# Patient Record
Sex: FEMALE | Race: BLACK OR AFRICAN AMERICAN | Employment: OTHER | URBAN - METROPOLITAN AREA
[De-identification: names, ages, dates, MRNs, and addresses within clinical notes are randomized per-mention and may not be internally consistent; named-entity substitution may affect disease eponyms.]

---

## 2017-01-06 ENCOUNTER — TELEPHONE (OUTPATIENT)
Dept: ONCOLOGY | Age: 77
End: 2017-01-06

## 2017-02-21 ENCOUNTER — TELEPHONE (OUTPATIENT)
Dept: ONCOLOGY | Age: 77
End: 2017-02-21

## 2017-02-21 NOTE — TELEPHONE ENCOUNTER
[employer]  Medical Oncology at [address] [place] [address]    02/21/17- Call placed to follow up with patient's daughter, Татьяна Espinoza. She stated Ms. Gaby Mejia should be getting out of rehab this week. They're planning to establish care with Dr. Kayla Mallory at [facility] in [facility] Dyan uLz MD.  Татьяна Espinoza requested that we send records as they're hoping to get an appointment in the next couple of weeks. Dr. Caroline Gonzales notified. 02/23/17- Medical records faxed to Dr. Asim Durán office at [phone], confirmation received. Office phone number [phone].

## 2017-10-20 ENCOUNTER — OFFICE VISIT (OUTPATIENT)
Dept: ONCOLOGY | Age: 77
End: 2017-10-20

## 2017-10-20 ENCOUNTER — HOSPITAL ENCOUNTER (OUTPATIENT)
Dept: INFUSION THERAPY | Age: 77
Discharge: HOME | End: 2017-10-20
Payer: MEDICARE

## 2017-10-20 VITALS
OXYGEN SATURATION: 98 % | HEART RATE: 55 BPM | DIASTOLIC BLOOD PRESSURE: 60 MMHG | RESPIRATION RATE: 20 BRPM | TEMPERATURE: 96.7 F | SYSTOLIC BLOOD PRESSURE: 168 MMHG

## 2017-10-20 DIAGNOSIS — I48.20 CHRONIC ATRIAL FIBRILLATION (HCC): ICD-10-CM

## 2017-10-20 DIAGNOSIS — I82.4Y3 ACUTE DEEP VEIN THROMBOSIS (DVT) OF PROXIMAL VEIN OF BOTH LOWER EXTREMITIES (HCC): ICD-10-CM

## 2017-10-20 DIAGNOSIS — I63.9 ACUTE CVA (CEREBROVASCULAR ACCIDENT) (HCC): ICD-10-CM

## 2017-10-20 DIAGNOSIS — C67.9 MALIGNANT NEOPLASM OF URINARY BLADDER, UNSPECIFIED SITE (HCC): Primary | ICD-10-CM

## 2017-10-20 DIAGNOSIS — E11.8 TYPE 2 DIABETES MELLITUS WITH COMPLICATION, UNSPECIFIED LONG TERM INSULIN USE STATUS: ICD-10-CM

## 2017-10-20 DIAGNOSIS — I49.5 SICK SINUS SYNDROME (HCC): ICD-10-CM

## 2017-10-20 LAB
ALBUMIN SERPL-MCNC: 3.4 G/DL (ref 3.5–5)
ALBUMIN/GLOB SERPL: 0.8 {RATIO} (ref 1.1–2.2)
ALP SERPL-CCNC: 82 U/L (ref 45–117)
ALT SERPL-CCNC: 16 U/L (ref 12–78)
ANION GAP SERPL CALC-SCNC: 4 MMOL/L (ref 5–15)
APTT PPP: 27.1 SEC (ref 22.1–32.5)
AST SERPL-CCNC: 21 U/L (ref 15–37)
BASOPHILS # BLD: 0 K/UL (ref 0–0.1)
BASOPHILS NFR BLD: 0 % (ref 0–1)
BILIRUB SERPL-MCNC: 0.5 MG/DL (ref 0.2–1)
BUN SERPL-MCNC: 13 MG/DL (ref 6–20)
BUN/CREAT SERPL: 18 (ref 12–20)
CALCIUM SERPL-MCNC: 9.4 MG/DL (ref 8.5–10.1)
CHLORIDE SERPL-SCNC: 109 MMOL/L (ref 97–108)
CO2 SERPL-SCNC: 32 MMOL/L (ref 21–32)
CREAT SERPL-MCNC: 0.74 MG/DL (ref 0.55–1.02)
EOSINOPHIL # BLD: 0.1 K/UL (ref 0–0.4)
EOSINOPHIL NFR BLD: 3 % (ref 0–7)
ERYTHROCYTE [DISTWIDTH] IN BLOOD BY AUTOMATED COUNT: 15.8 % (ref 11.5–14.5)
GLOBULIN SER CALC-MCNC: 4.4 G/DL (ref 2–4)
GLUCOSE SERPL-MCNC: 87 MG/DL (ref 65–100)
HCT VFR BLD AUTO: 32.5 % (ref 35–47)
HGB BLD-MCNC: 9.9 G/DL (ref 11.5–16)
INR PPP: 1.1 (ref 0.9–1.1)
LYMPHOCYTES # BLD: 0.9 K/UL (ref 0.8–3.5)
LYMPHOCYTES NFR BLD: 20 % (ref 12–49)
MCH RBC QN AUTO: 25.2 PG (ref 26–34)
MCHC RBC AUTO-ENTMCNC: 30.5 G/DL (ref 30–36.5)
MCV RBC AUTO: 82.7 FL (ref 80–99)
MONOCYTES # BLD: 0.3 K/UL (ref 0–1)
MONOCYTES NFR BLD: 6 % (ref 5–13)
NEUTS SEG # BLD: 3.3 K/UL (ref 1.8–8)
NEUTS SEG NFR BLD: 71 % (ref 32–75)
PLATELET # BLD AUTO: 179 K/UL (ref 150–400)
POTASSIUM SERPL-SCNC: 4.1 MMOL/L (ref 3.5–5.1)
PROT SERPL-MCNC: 7.8 G/DL (ref 6.4–8.2)
PROTHROMBIN TIME: 11.1 SEC (ref 9–11.1)
RBC # BLD AUTO: 3.93 M/UL (ref 3.8–5.2)
SODIUM SERPL-SCNC: 145 MMOL/L (ref 136–145)
THERAPEUTIC RANGE,PTTT: NORMAL SECS (ref 58–77)
WBC # BLD AUTO: 4.7 K/UL (ref 3.6–11)

## 2017-10-20 PROCEDURE — 85025 COMPLETE CBC W/AUTO DIFF WBC: CPT | Performed by: INTERNAL MEDICINE

## 2017-10-20 PROCEDURE — 85610 PROTHROMBIN TIME: CPT | Performed by: INTERNAL MEDICINE

## 2017-10-20 PROCEDURE — 80053 COMPREHEN METABOLIC PANEL: CPT | Performed by: INTERNAL MEDICINE

## 2017-10-20 PROCEDURE — 36415 COLL VENOUS BLD VENIPUNCTURE: CPT | Performed by: INTERNAL MEDICINE

## 2017-10-20 PROCEDURE — 85730 THROMBOPLASTIN TIME PARTIAL: CPT | Performed by: INTERNAL MEDICINE

## 2017-10-20 NOTE — MR AVS SNAPSHOT
Visit Information Date & Time Provider Department Dept. Phone Encounter #  
 10/20/2017 11:00 AM Karyn Hathaway MD [facility] at [address] [id number] Follow-up Instructions Return in about 12 days (around 11/1/2017) for Radaakash, image results, bladder cancer. Your Appointments 11/1/2017 10:15 AM  
ESTABLISHED PATIENT with Oscar Sanderson NP  
[address] at [address] [place] [address] Appt Note: Radaakash, image results, bladder cancer [address] [facility], [address] [facility] [place] [address]  
[phone]  
  
   
 [address] [facility], [address] [facility] Upcoming Health Maintenance Date Due  
 FOOT EXAM Q1 6/26/1950 MICROALBUMIN Q1 6/26/1950 COLONOSCOPY 6/26/1958 DTaP/Tdap/Td series (1 - Tdap) 6/26/1961 ZOSTER VACCINE AGE 60> 4/26/2000 OSTEOPOROSIS SCREENING (DEXA) 6/26/2005 Pneumococcal 65+ High/Highest Risk (1 of 2 - PCV13) 6/26/2005 MEDICARE YEARLY EXAM 6/26/2005 EYE EXAM RETINAL OR DILATED Q1 11/30/2015 GLAUCOMA SCREENING Q2Y 11/30/2016 LIPID PANEL Q1 1/18/2017 HEMOGLOBIN A1C Q6M 5/15/2017 INFLUENZA AGE 9 TO ADULT 8/1/2017 Allergies as of 10/20/2017  Review Complete On: 10/20/2017 By: Karyn Hathaway MD  
  
 Severity Noted Reaction Type Reactions Erythromycin  11/28/2012    Hives Flagyl [Metronidazole]  11/28/2012    Hives Macrobid [Nitrofurantoin Monohyd/m-cryst]  10/10/2016    Hives Pcn [Penicillins]  09/09/2016    Hives Sulfa (Sulfonamide Antibiotics)  10/06/2010    Other (comments) Sulfa (Sulfonamide Antibiotics)  11/28/2012    Hives Current Immunizations  Reviewed on 11/15/2016 Name Date Influenza Vaccine 10/18/2015 Not reviewed this visit You Were Diagnosed With   
  
 Codes Comments Malignant neoplasm of urinary bladder, unspecified site [facility])    -  Primary ICD-10-CM: C67.9 ICD-9-CM: 188.9 Acute CVA (cerebrovascular accident) ([facility] [address]     ICD-10-CM: I63.9 ICD-9-CM: 434.91 Type 2 diabetes mellitus with complication, unspecified long term insulin use status (HCC)     ICD-10-CM: E11.8 ICD-9-CM: 250.90 Acute deep vein thrombosis (DVT) of proximal vein of both lower extremities (HCC)     ICD-10-CM: I82.4Y3 ICD-9-CM: 453.41 Sick sinus syndrome (HCC)     ICD-10-CM: I49.5 ICD-9-CM: 427.81 Chronic atrial fibrillation (HCC)     ICD-10-CM: W33.8 ICD-9-CM: 427.31 Vitals BP Pulse Temp Resp SpO2 OB Status 168/60 (BP 1 Location: Right arm, BP Patient Position: Sitting) (!) 55 96.7 °F (35.9 °C) (Temporal) 20 98% Hysterectomy Smoking Status Never Smoker Preferred Pharmacy Pharmacy Name Phone [facility]Y [id number] [place] [phone] Your Updated Medication List  
  
   
This list is accurate as of: 10/20/17 11:59 AM.  Always use your most recent med list.  
  
  
  
  
 amiodarone 200 mg tablet Commonly known as:  CORDARONE Take 100 mg by mouth daily. lidocaine-prilocaine topical cream  
Commonly known as:  EMLA Apply  to affected area as needed for Pain (Apply 30-60 min. prior to having your port accessed). losartan 50 mg tablet Commonly known as:  COZAAR Take 50 mg by mouth two (2) times a day. methIMAzole 5 mg tablet Commonly known as:  TAPAZOLE Take 0.5 Tabs by mouth daily. metoprolol tartrate 50 mg tablet Commonly known as:  LOPRESSOR Take 1 Tab by mouth two (2) times a day. pantoprazole 40 mg tablet Commonly known as:  PROTONIX Take 40 mg by mouth daily. PHOSPHA 250 NEUTRAL 250 mg tablet Generic drug:  phosphorus Take 1 Tab by mouth two (2) times a day. pravastatin 80 mg tablet Commonly known as:  PRAVACHOL Take 1 Tab by mouth nightly. We Performed the Following REFERRAL TO UROLOGY [PEA070 Custom] Follow-up Instructions Return in about 12 days (around 11/1/2017) for Raddin, image results, bladder cancer. To-Do List   
 10/23/2017 Imaging:  CT ABD PELV W WO CONT Around 10/23/2017 Imaging:  CT CHEST W CONT Referral Information Referral ID Referred By Referred To  
  
 [id number] [address]   
   [place], [address] Visits Status Start Date End Date 1 New Request 10/20/17 10/20/18 If your referral has a status of pending review or denied, additional information will be sent to support the outcome of this decision. Introducing [facility]! Dear Betsy Barclay: Thank you for requesting a [employer] account. Our records indicate that you already have an active Beyond[employer] account. You can access your account anytime at [email or web address] Did you know that you can access your hospital and ER discharge instructions at any time in Beyond[employer][email or web address]? You can also review all of your test results from your hospital stay or ER visit. Additional Information If you have questions, please visit the Frequently Asked Questions section of the [email or web address] website at [email or web address] Remember, [email or web address] is NOT to be used for urgent needs. For medical emergencies, dial 911. Now available from your iPhone and Android! Please provide this summary of care documentation to your next provider. Your primary care clinician is listed as Yamilet Rice. If you have any questions after today's visit, please call [phone].

## 2017-10-20 NOTE — PROGRESS NOTES
[address] [facility] Oncology at [address] [place] [address]  [phone]    Hematology / Oncology Followup    Reason for Visit:   Darci Glover is a 68 y.o. female who is seen for follow up of bladder cancer. Treatment History:   · Presented with gross hematuria 6/30/2016  · CT A/P 6/30/2016: Serpentine like filling defects present dependent aspect of urinary bladder. No evidence of metastatic disease  · Cystoscopy with Dr. Ml Olsen 7/11/2016: possible tumor adjacent to bladder diverticulum on right wall of bladder  · Cystoscopy with Dr. Cornelius Lemon 8/10/2016: Solid-appearing tumor on right bladder wall, about 1cm  · TURBT 8/31/2016: Invasive high-grade papillary urothelial carcinoma. There is at least superficial invasion of muscular propria  · CT Chest 9/20/2016: 4mm RLL noncalcified pulmonary nodule. Subcentimeter hypodensity right hepatic lobe too small to characterize. Left upper pole simple renal cyst 1.7cm. · Stage II (pT2 cN0 M0) Bladder Cancer  · Neoadjuvant chemotherapy with split-dose cisplatin and gemcitabine x2 cycles from 10/18/2016 to 11/15/2016. Stopped due to CVA  · Chemotherapy with Carboplatin and Gemcitabine x4 cycles completed around 5/2017 in [place]    History of Present Illness:   She returns today for follow up, almost one year since I last saw her. I treated her with neoadjuvant chemotherapy for bladder cancer, but this was stopped after she suffered a stroke. She went into a SNF and moved to [facility]. She never did have her bladder resected. She ultimately established care with oncology in [place] and was treated with four cycles of Carboplatin and Gemcitabine, reportedly tolerating this well based on records. She agrees that she did well with this. She reports that she was sent to radiation oncology, but moved down here before getting radiation. She does not recall the last time she had a cystoscopy or CT scan.     She reports being diagnosed with a DVT while in [place], treated with xarelto for 3 months. She has been off this for a few days. She fell yesterday, went to the ED at [facility], had sutures placed in scalp. She is weak. She walks with a walker, but struggles. She has someone staying with her around the clock right now. She is a poor historian, remembers very little details about her time in [place]. She has two family members with her today, but not her daughter who was with her in [place]. PAST HISTORY: The following sections were reviewed and updated in the EMR as appropriate: PMH, SH, FH, Medications, Allergies. Allergies   Allergen Reactions    Erythromycin Hives    Flagyl [Metronidazole] Hives    Macrobid [Nitrofurantoin Monohyd/M-Cryst] Hives    Pcn [Penicillins] Hives    Sulfa (Sulfonamide Antibiotics) Other (comments)    Sulfa (Sulfonamide Antibiotics) Hives      Review of Systems: A complete review of systems was obtained, reviewed, and scanned into the EMR. Pertinent findings reviewed above. Physical Exam:     Visit Vitals    /60 (BP 1 Location: Right arm, BP Patient Position: Sitting)    Pulse (!) 55    Temp 96.7 °F (35.9 °C) (Temporal)    Resp 20    SpO2 98%     ECOG PS: 2  General: No distress  Eyes: PERRLA, anicteric sclerae  HENT: Atraumatic, OP clear  Neck: Supple  Lymphatic: No cervical, supraclavicular, or inguinal adenopathy  Respiratory: CTAB, normal respiratory effort  CV: Normal rate, regular rhythm, no murmurs, no peripheral edema  GI: Soft, nontender, nondistended, no masses, no hepatomegaly, no splenomegaly  MS: In wheelchair today. Digits without clubbing or cyanosis. Not able to get onto exam table. Skin: No rashes, ecchymoses, or petechiae. Normal temperature, turgor, and texture. Port in place.   Psych: Alert, oriented, appropriate affect, normal judgment/insight    Results:     Lab Results   Component Value Date/Time    WBC 5.5 11/20/2016 02:21 AM    HGB 10.0 11/20/2016 02:21 AM HCT 30.2 11/20/2016 02:21 AM    PLATELET 466 41/48/2069 02:21 AM    MCV 80.7 11/20/2016 02:21 AM    ABS. NEUTROPHILS 3.7 11/20/2016 02:21 AM    Hemoglobin (POC) 10.5 11/08/2016 09:48 AM    Hematocrit (POC) 31 11/08/2016 09:48 AM     Lab Results   Component Value Date/Time    Sodium 142 11/20/2016 02:21 AM    Potassium 3.5 11/20/2016 02:21 AM    Chloride 106 11/20/2016 02:21 AM    CO2 30 11/20/2016 02:21 AM    Glucose 98 11/20/2016 02:21 AM    BUN 11 11/20/2016 02:21 AM    Creatinine 0.80 11/20/2016 02:21 AM    GFR est AA >60 11/20/2016 02:21 AM    GFR est non-AA >60 11/20/2016 02:21 AM    Calcium 8.4 11/20/2016 02:21 AM    Sodium (POC) 143 11/08/2016 09:48 AM    Potassium (POC) 3.6 11/08/2016 09:48 AM    Chloride (POC) 104 11/08/2016 09:48 AM    Glucose (POC) 84 11/16/2016 11:13 AM    BUN (POC) 7 11/08/2016 09:48 AM    Creatinine (POC) 0.8 11/08/2016 09:48 AM    Calcium, ionized (POC) 1.18 11/08/2016 09:48 AM     Lab Results   Component Value Date/Time    Bilirubin, total 0.4 11/16/2016 04:34 AM    ALT (SGPT) 15 11/16/2016 04:34 AM    AST (SGOT) 13 11/16/2016 04:34 AM    Alk. phosphatase 57 11/16/2016 04:34 AM    Protein, total 6.1 11/16/2016 04:34 AM    Albumin 2.7 11/16/2016 04:34 AM    Globulin 3.4 11/16/2016 04:34 AM       CT C/A/P 5/30/2017: Decreased size of right posterolateral bladder nodule. Right posterolateral bladder wall thickening adjacent to nodule. Stable tiny pulmonary nodules. Records reviewed and summarized above. Assessment:   1) Bladder Cancer  Initially diagnosed with Stage II bladder cancer, received neoadjuvant chemotherapy which was complicated by a CVA. Surgery was therefor not performed. After a several month break she received chemotherapy with Carboplatin and Gemcitabine for 4 cycles in [place]. Plan had been for radiation after this, but again she was lost to follow up until now. I will restage her with CT C/A/P. I will get her back in with urology for another cystoscopy. If no evidence of distant disease, we can consider definitive radiation +/- chemotherapy, but she may need a repeat TURBT prior to this, depending on what is see on cystoscopy. 2) DVT, bilateral  Occurred in [place]. Off anticoagulation now. Request records. For a malignancy associated thrombus, she may benefit from long-term anticoagulation. Additionally, with her Afib she has an indication for long-term therapy. Reasonable to continue to hold for now, given possible upcoming procedures. 3) Bradycardia, Afib  S/P pacemaker placement. Previously on apxiaban, then rivaroxaban. Now off anticoagulation. Follow up with cardiology. 4) DM  PCP managing. 5) Port care  Has not been flushed in some time. Will set her up for regular flushes.     Plan:     · Labs today: CBC, CMP, PT, PTT  · Port flushes every 4-6 weeks  · CT C/A/P  · Referral back to urology for cystoscopy  · Request additional records from [place]  · Return to see me to review results of above      Signed By: Figueroa Cuellar MD     October 20, 2017

## 2017-10-20 NOTE — PROGRESS NOTES
There were no vitals taken for this visit. Pt arrived at 1216,labs drawn peripherally from left [facility]. Pt tolerated well and left at 1225.

## 2017-10-30 ENCOUNTER — HOSPITAL ENCOUNTER (OUTPATIENT)
Dept: CT IMAGING | Age: 77
Discharge: HOME | End: 2017-10-30
Attending: INTERNAL MEDICINE
Payer: MEDICARE

## 2017-10-30 DIAGNOSIS — C67.9 MALIGNANT NEOPLASM OF URINARY BLADDER, UNSPECIFIED SITE (HCC): ICD-10-CM

## 2017-10-30 PROCEDURE — 74011636320 HC RX REV CODE- 636/320: Performed by: RADIOLOGY

## 2017-10-30 PROCEDURE — 74177 CT ABD & PELVIS W/CONTRAST: CPT

## 2017-10-30 RX ADMIN — IOPAMIDOL 90 ML: 755 INJECTION, SOLUTION INTRAVENOUS at 10:16

## 2017-11-01 ENCOUNTER — OFFICE VISIT (OUTPATIENT)
Dept: ONCOLOGY | Age: 77
End: 2017-11-01

## 2017-11-01 ENCOUNTER — DOCUMENTATION ONLY (OUTPATIENT)
Dept: ONCOLOGY | Age: 77
End: 2017-11-01

## 2017-11-01 VITALS
SYSTOLIC BLOOD PRESSURE: 168 MMHG | RESPIRATION RATE: 20 BRPM | TEMPERATURE: 97.8 F | HEART RATE: 60 BPM | OXYGEN SATURATION: 98 % | DIASTOLIC BLOOD PRESSURE: 70 MMHG

## 2017-11-01 DIAGNOSIS — C67.9 MALIGNANT NEOPLASM OF URINARY BLADDER, UNSPECIFIED SITE (HCC): Primary | ICD-10-CM

## 2017-11-01 DIAGNOSIS — I48.20 CHRONIC ATRIAL FIBRILLATION (HCC): ICD-10-CM

## 2017-11-01 DIAGNOSIS — E11.8 TYPE 2 DIABETES MELLITUS WITH COMPLICATION, UNSPECIFIED LONG TERM INSULIN USE STATUS: ICD-10-CM

## 2017-11-01 DIAGNOSIS — I63.9 ACUTE CVA (CEREBROVASCULAR ACCIDENT) (HCC): ICD-10-CM

## 2017-11-01 DIAGNOSIS — I82.4Y3 ACUTE DEEP VEIN THROMBOSIS (DVT) OF PROXIMAL VEIN OF BOTH LOWER EXTREMITIES (HCC): ICD-10-CM

## 2017-11-01 DIAGNOSIS — R93.2 ABNORMAL FINDINGS ON DIAGNOSTIC IMAGING OF LIVER: ICD-10-CM

## 2017-11-01 NOTE — PROGRESS NOTES
[address] [facility] at [facility]  [phone]    Hematology / Oncology Followup    Reason for Visit:   Darci Glover is a 68 y.o. female who is seen for follow up of bladder cancer. Treatment History:   · Presented with gross hematuria 6/30/2016  · CT A/P 6/30/2016: Serpentine like filling defects present dependent aspect of urinary bladder. No evidence of metastatic disease  · Cystoscopy with Dr. Ml Olsen 7/11/2016: possible tumor adjacent to bladder diverticulum on right wall of bladder  · Cystoscopy with Dr. Cornelius Lemon 8/10/2016: Solid-appearing tumor on right bladder wall, about 1cm  · TURBT 8/31/2016: Invasive high-grade papillary urothelial carcinoma. There is at least superficial invasion of muscular propria  · CT Chest 9/20/2016: 4mm RLL noncalcified pulmonary nodule. Subcentimeter hypodensity right hepatic lobe too small to characterize. Left upper pole simple renal cyst 1.7cm. · Stage II (pT2 cN0 M0) Bladder Cancer  · Neoadjuvant chemotherapy with split-dose cisplatin and gemcitabine x2 cycles from 10/18/2016 to 11/15/2016. Stopped due to CVA  · Chemotherapy with Carboplatin and Gemcitabine x4 cycles completed around 5/2017 in [place]    History of Present Illness:   Here today for follow up and imaging results. She reports feeling okay. Walking with walker at home. Has aides round the clock to help her at home. She reports voiding without difficulty. Some mild swelling to legs but stable. She reports she spends most of her days sitting in her wheelchair but getting up and walking around. No falls since last visit. She states she has TURBT scheduled for 11/20 with Dr. Cornelius Lemon. She is accompanied by her cousin today. PAST HISTORY: The following sections were reviewed and updated in the EMR as appropriate: PMH, SH, FH, Medications, Allergies.       Allergies   Allergen Reactions    Erythromycin Hives    Flagyl [Metronidazole] Hives    Macrobid [Nitrofurantoin Monohyd/M-Cryst] Hives    Pcn [Penicillins] Hives    Sulfa (Sulfonamide Antibiotics) Other (comments)    Sulfa (Sulfonamide Antibiotics) Hives      Review of Systems: A complete review of systems was obtained, reviewed, and scanned into the EMR. Pertinent findings reviewed above. Physical Exam:     Visit Vitals    /70 (BP 1 Location: Left arm, BP Patient Position: Sitting)    Pulse 60    Temp 97.8 °F (36.6 °C) (Temporal)    Resp 20    SpO2 98%     ECOG PS: 2  General: No distress  Eyes: PERRLA, anicteric sclerae  HENT: Atraumatic, OP clear  Neck: Supple  Lymphatic: No cervical, supraclavicular, or inguinal adenopathy  Respiratory: CTAB, normal respiratory effort  CV: Normal rate, regular rhythm, no murmurs, no peripheral edema  GI: Soft, nontender, nondistended, no masses, no hepatomegaly, no splenomegaly  MS: In wheelchair today. Digits without clubbing or cyanosis. Not able to get onto exam table. Skin: No rashes, ecchymoses, or petechiae. Normal temperature, turgor, and texture. Port in place. Psych: Alert, oriented, appropriate affect, normal judgment/insight    Results:     Lab Results   Component Value Date/Time    WBC 4.7 10/20/2017 12:18 PM    HGB 9.9 10/20/2017 12:18 PM    HCT 32.5 10/20/2017 12:18 PM    PLATELET 204 42/35/0087 12:18 PM    MCV 82.7 10/20/2017 12:18 PM    ABS.  NEUTROPHILS 3.3 10/20/2017 12:18 PM    Hemoglobin (POC) 10.5 11/08/2016 09:48 AM    Hematocrit (POC) 31 11/08/2016 09:48 AM     Lab Results   Component Value Date/Time    Sodium 145 10/20/2017 12:18 PM    Potassium 4.1 10/20/2017 12:18 PM    Chloride 109 10/20/2017 12:18 PM    CO2 32 10/20/2017 12:18 PM    Glucose 87 10/20/2017 12:18 PM    BUN 13 10/20/2017 12:18 PM    Creatinine 0.74 10/20/2017 12:18 PM    GFR est AA >60 10/20/2017 12:18 PM    GFR est non-AA >60 10/20/2017 12:18 PM    Calcium 9.4 10/20/2017 12:18 PM    Sodium (POC) 143 11/08/2016 09:48 AM    Potassium (POC) 3.6 11/08/2016 09:48 AM Chloride (POC) 104 11/08/2016 09:48 AM    Glucose (POC) 84 11/16/2016 11:13 AM    BUN (POC) 7 11/08/2016 09:48 AM    Creatinine (POC) 0.8 11/08/2016 09:48 AM    Calcium, ionized (POC) 1.18 11/08/2016 09:48 AM     Lab Results   Component Value Date/Time    Bilirubin, total 0.5 10/20/2017 12:18 PM    ALT (SGPT) 16 10/20/2017 12:18 PM    AST (SGOT) 21 10/20/2017 12:18 PM    Alk. phosphatase 82 10/20/2017 12:18 PM    Protein, total 7.8 10/20/2017 12:18 PM    Albumin 3.4 10/20/2017 12:18 PM    Globulin 4.4 10/20/2017 12:18 PM       CT C/A/P 5/30/2017: Decreased size of right posterolateral bladder nodule. Right posterolateral bladder wall thickening adjacent to nodule. Stable tiny pulmonary nodules. CT C/A/P 10/30/2017:   1. Superior right posteriolateral bladder wall mass measuring 1.2 x 1.4 x 1.6 cm suspicious for bladder cancer. 2. 2.5 x 2.5 cm calcification containing soft tissue mass adjacent to left internal iliac artery concerning for metastatic elli disease, unchanged since 6/30/2016. 3. Probable thyroid goiter. Correlation with any prior outside imaging or with thyroid ultrasound is recommended. 4. Heterogeneous portal venous phase enhancement of the posterior aspect of the right lobe of the liver not typical for metastatic disease but of uncertain etiology. Correlate with clinical and laboratory data and consider further evaluation with dedicated hepatic MRI preferred over CT. 5. Additional incidental findings including coronary artery disease and nonobstructed small bowel loop within midline abdominal wall hernia. Assessment:   1) Bladder Cancer  Initially diagnosed with Stage II bladder cancer, received neoadjuvant chemotherapy which was complicated by a CVA. Surgery was therefore not performed. After a several month break she received chemotherapy with Carboplatin and Gemcitabine for 4 cycles in [place].   Plan in [place] had been to proceed next with radiation +/- chemotherapy, but again she was lost to follow up until reestablishing care with me recently. Repeat cystoscopy with Dr. Charan Rees has confirmed residual bladder tumor, and he plans a repeat TURBT. Restaging CT scan confirms this finding, along with a stable but suspicious pelvic lymph node and a questionable liver abnormality. I will get an MRI to evaluate the questionable liver finding. If no evidence of metastatic disease, then I would like to get an opinion from radiation oncology regarding the feasibility of definitive radiation +/- chemotherapy. The lymph node may complicate this somewhat. She apparently did well in [place] with Carbo/Freeborn, so I would consider concurrent chemotherapy with FU and Mitomycin. If she is not a candidate for definitive radiation, then I wonder about a course of palliative radiation followed potentially by immunotherapy. She will follow up with me after meeting with radiation oncology. 2) DVT, bilateral  Occurred in [place]. Off anticoagulation now. For a malignancy associated thrombus, she may benefit from long-term anticoagulation. Additionally, with her Afib she has an indication for long-term therapy. Reasonable to continue to hold for now, given possible upcoming procedures. 3) Bradycardia, Afib  S/P pacemaker placement. Previously on apxiaban, then rivaroxaban. Now off anticoagulation. Follow up with cardiology. 4) DM  PCP managing. 5) Port care  Continue port flushes.      Plan:     · Referral to radiation oncology  · MRI Abdomen  · TURBT with Dr. Charan Rees  · Port flush every 4-6 weeks  · Return to clinic TBD pending rad onc visit    Case discussed with Dr. Evy Galeas By: Ritchie Gómez MD     November 1, 2017

## 2017-11-01 NOTE — MR AVS SNAPSHOT
Visit Information Date & Time Provider Department Dept. Phone Encounter #  
 11/1/2017  2:45 PM Ana Maria Fowler NP [facility] at [address] [place] [address] Rd [id number] Upcoming Health Maintenance Date Due  
 FOOT EXAM Q1 6/26/1950 MICROALBUMIN Q1 6/26/1950 COLONOSCOPY 6/26/1958 DTaP/Tdap/Td series (1 - Tdap) 6/26/1961 ZOSTER VACCINE AGE 60> 4/26/2000 OSTEOPOROSIS SCREENING (DEXA) 6/26/2005 Pneumococcal 65+ High/Highest Risk (1 of 2 - PCV13) 6/26/2005 MEDICARE YEARLY EXAM 6/26/2005 EYE EXAM RETINAL OR DILATED Q1 11/30/2015 GLAUCOMA SCREENING Q2Y 11/30/2016 LIPID PANEL Q1 1/18/2017 HEMOGLOBIN A1C Q6M 5/15/2017 INFLUENZA AGE 9 TO ADULT 8/1/2017 Allergies as of 11/1/2017  Review Complete On: 11/1/2017 By: Ana Maria Fowler NP Severity Noted Reaction Type Reactions Erythromycin  11/28/2012    Hives Flagyl [Metronidazole]  11/28/2012    Hives Macrobid [Nitrofurantoin Monohyd/m-cryst]  10/10/2016    Hives Pcn [Penicillins]  09/09/2016    Hives Sulfa (Sulfonamide Antibiotics)  10/06/2010    Other (comments) Sulfa (Sulfonamide Antibiotics)  11/28/2012    Hives Current Immunizations  Reviewed on 11/15/2016 Name Date Influenza Vaccine 10/18/2015 Not reviewed this visit Vitals BP Pulse Temp Resp SpO2 OB Status 168/70 (BP 1 Location: Left arm, BP Patient Position: Sitting) 60 97.8 °F (36.6 °C) (Temporal) 20 98% Hysterectomy Smoking Status Never Smoker Preferred Pharmacy Pharmacy Name Phone [id number] [address] [place] [address] [phone] Your Updated Medication List  
  
   
This list is accurate as of: 11/1/17  3:50 PM.  Always use your most recent med list.  
  
  
  
  
 amiodarone 200 mg tablet Commonly known as:  CORDARONE Take 100 mg by mouth daily. lidocaine-prilocaine topical cream  
Commonly known as:  EMLA Apply  to affected area as needed for Pain (Apply 30-60 min. prior to having your port accessed). losartan 50 mg tablet Commonly known as:  COZAAR Take 50 mg by mouth two (2) times a day. methIMAzole 5 mg tablet Commonly known as:  TAPAZOLE Take 0.5 Tabs by mouth daily. metoprolol tartrate 50 mg tablet Commonly known as:  LOPRESSOR Take 1 Tab by mouth two (2) times a day. pantoprazole 40 mg tablet Commonly known as:  PROTONIX Take 40 mg by mouth daily. PHOSPHA 250 NEUTRAL 250 mg tablet Generic drug:  phosphorus Take 1 Tab by mouth two (2) times a day. pravastatin 80 mg tablet Commonly known as:  PRAVACHOL Take 1 Tab by mouth nightly. Introducing [facility] SERVICES! Dear Sharmila Charter: Thank you for requesting a [employer] account. Our records indicate that you already have an active [employer] account. You can access your account anytime at [email or web address] Did you know that you can access your hospital and ER discharge instructions at any time in [employer]? You can also review all of your test results from your hospital stay or ER visit. Additional Information If you have questions, please visit the Frequently Asked Questions section of the [employer] website at [email or web address] Remember, [employer] is NOT to be used for urgent needs. For medical emergencies, dial 911. Now available from your iPhone and Android! Please provide this summary of care documentation to your next provider. Your primary care clinician is listed as Migdalia Metzger. If you have any questions after today's visit, please call [phone].

## 2017-11-02 NOTE — PROGRESS NOTES
[facility]  Social Work Navigator Encounter     Patient Name:   Rolando Hernandez    Medical History: bladder cancer    Narrative: Pt returns to clinic for follow-up with Dr. Julius Goins. Met with pt to address transportation barrier to care identified. Pt lives in [place] and in unable to drive. Pt tells me she does not have family/firends able to provide transporation to appointments or treatments. Pt also tells me her income is limited and thus she does not have the financial resources to pay for transportation. This MSW called [facility] to inquire about transportation resource. VM left with the , [employer], requesting a return call. Phone # [phone]. Pt might also be eligible for financial assistance through [address] to help with the cost of transportation. Will discuss transportation resources with pt once more information on obtained. Barriers to Care: transportation    Plan:   1.  This MSW will research transportation options and discuss with pt    -BIJAN Rea

## 2017-11-08 ENCOUNTER — HOSPITAL ENCOUNTER (OUTPATIENT)
Dept: RADIATION THERAPY | Age: 77
Discharge: HOME | End: 2017-11-08

## 2017-11-08 ENCOUNTER — TELEPHONE (OUTPATIENT)
Dept: ONCOLOGY | Age: 77
End: 2017-11-08

## 2017-11-08 NOTE — TELEPHONE ENCOUNTER
11/8 - Left vm for pt requesting a return call to discuss possible transportation resources. Will recommend pt call [facility] (#[phone]) to complete phone intake for transportation program asap. Intake takes about 3 days. There is a small fee based on income. 11/9 - Called pt to provide her within transportation information noted above. Pt tells me she will call to schedule and intake with them today. Pt was asked to call me back once intake is completed to let me know if she has secured transportation with the resource so I can update Dr. Romario Post and Dr. Jessi Urias. Pt voiced understanding. 11/14 - Follow-up call placed to pt to discuss whether pt will use transportation resources noted above. VM left requesting a return call. 11/15 - Spoke with pt who tells me she has not received a follow-up call to complete transportation screening. She tells me she called for the second time yesterday and was told she should receive a call soon. This MSW advised she would call pt on Friday (11/17) to find out if she was approved and then follow-up with providers. Pt voiced understanding. 11/17 - Follow-up call placed to pt to ask if she was able to get qualify for transportation with [facility]. Left  requesting a return call. 11/22 - Called and spoke to pt regarding transportation. Pt tells me [facility] called her back a few days ago but she missed the call. She tells me she has not tried to call them back yet. Encouraged pt call them so we know where to schedule radiation appointments. Pt voiced understanding and tells me she will call them today and then call me back with an update.  Provide pt my contact information.        -BIJAN Ordaz

## 2017-11-27 ENCOUNTER — TELEPHONE (OUTPATIENT)
Dept: ONCOLOGY | Age: 77
End: 2017-11-27

## 2017-11-28 NOTE — TELEPHONE ENCOUNTER
11/28 - Called pt and asked if she was able to secure transportation with [facility] for radiation treatment. Pt tells me she called last week but their office was closed for the holiday so she has not completed the intake. She tells me she will call again today. This MSW called [facility] and left  with Ms. Tanya Treviño, the transportation , to inquire about this referral.     2:48pm - Follow-up call placed to pt regarding transportation. Pt tells me she left another  but was told Ms. Tanya Treviño was out the office today. She tells me she will call again tomorrow. This MSW updated Ori Farrell RN.      -BIJAN Rea

## 2017-12-05 ENCOUNTER — TELEPHONE (OUTPATIENT)
Dept: ONCOLOGY | Age: 77
End: 2017-12-05

## 2017-12-05 ENCOUNTER — OP HISTORICAL/CONVERTED ENCOUNTER (OUTPATIENT)
Dept: OTHER | Age: 77
End: 2017-12-05

## 2017-12-05 NOTE — TELEPHONE ENCOUNTER
[address] [facility] [place]  [phone]    Patient saw Dr. Cristo Clark in [place]. She has consented to radiation, but doesn't want to start until after the Mike holiday. He is going to do her simulation on 12/27 and start on 1/3. I will bring her back before then to discuss concurrent chemotherapy further.

## 2017-12-06 NOTE — TELEPHONE ENCOUNTER
[address] at [place]  [phone]    12/06/17- Phone call placed to patient- she is scheduled for 12/20/17 at 8:45am to follow up to discuss treatment options.

## 2017-12-08 ENCOUNTER — TELEPHONE (OUTPATIENT)
Dept: ONCOLOGY | Age: 77
End: 2017-12-08

## 2017-12-08 NOTE — TELEPHONE ENCOUNTER
Patients daughter, Mago Disla, called and stated that she would like a call back regarding patients last appointment.  # [phone]

## 2017-12-08 NOTE — TELEPHONE ENCOUNTER
[facility]  [phone]    12/08/17- Phone call placed to [facility], she had questions regarding new diagnosis. She requested for  to return phone call to discuss.

## 2017-12-11 ENCOUNTER — TELEPHONE (OUTPATIENT)
Dept: ONCOLOGY | Age: 77
End: 2017-12-11

## 2017-12-11 NOTE — TELEPHONE ENCOUNTER
12/11  Follow-up call to pt's daughter, Emanuel Arteaga, and vm left requesting a return call to share transportation resources available to pt.    12/13 Follow-up call placed to pt's daughter. Provided her with contact information for [address] ([phone]) to a low cost transportation resource for seniors living in [place]. Explained the process and that a phone intake is needed. She tells me she will call this resource and help her mom get enrolled. She lives in [place] and is not able to help with regular transportation. No additional barriers identified a this time.  Encouraged her to call with additional questions.       -BIJAN Arias

## 2017-12-20 ENCOUNTER — DOCUMENTATION ONLY (OUTPATIENT)
Dept: ONCOLOGY | Age: 77
End: 2017-12-20

## 2017-12-20 ENCOUNTER — HOSPITAL ENCOUNTER (OUTPATIENT)
Dept: INFUSION THERAPY | Age: 77
Discharge: HOME | End: 2017-12-20
Payer: MEDICARE

## 2017-12-20 ENCOUNTER — OFFICE VISIT (OUTPATIENT)
Dept: ONCOLOGY | Age: 77
End: 2017-12-20

## 2017-12-20 VITALS
OXYGEN SATURATION: 98 % | RESPIRATION RATE: 18 BRPM | DIASTOLIC BLOOD PRESSURE: 67 MMHG | HEART RATE: 63 BPM | TEMPERATURE: 98.3 F | SYSTOLIC BLOOD PRESSURE: 145 MMHG

## 2017-12-20 VITALS
HEIGHT: 63 IN | TEMPERATURE: 97.9 F | BODY MASS INDEX: 28.53 KG/M2 | OXYGEN SATURATION: 98 % | HEART RATE: 60 BPM | RESPIRATION RATE: 18 BRPM | DIASTOLIC BLOOD PRESSURE: 67 MMHG | SYSTOLIC BLOOD PRESSURE: 151 MMHG | WEIGHT: 161 LBS

## 2017-12-20 DIAGNOSIS — I63.9 ACUTE CVA (CEREBROVASCULAR ACCIDENT) (HCC): ICD-10-CM

## 2017-12-20 DIAGNOSIS — E11.8 TYPE 2 DIABETES MELLITUS WITH COMPLICATION, UNSPECIFIED LONG TERM INSULIN USE STATUS: ICD-10-CM

## 2017-12-20 DIAGNOSIS — Z95.0 PACEMAKER: ICD-10-CM

## 2017-12-20 DIAGNOSIS — C67.9 MALIGNANT NEOPLASM OF URINARY BLADDER, UNSPECIFIED SITE (HCC): Primary | ICD-10-CM

## 2017-12-20 DIAGNOSIS — I82.4Y3 ACUTE DEEP VEIN THROMBOSIS (DVT) OF PROXIMAL VEIN OF BOTH LOWER EXTREMITIES (HCC): ICD-10-CM

## 2017-12-20 DIAGNOSIS — I48.20 CHRONIC ATRIAL FIBRILLATION (HCC): ICD-10-CM

## 2017-12-20 PROCEDURE — 96523 IRRIG DRUG DELIVERY DEVICE: CPT

## 2017-12-20 PROCEDURE — 77030012965 HC NDL HUBR BBMI -A

## 2017-12-20 RX ORDER — ONDANSETRON HYDROCHLORIDE 8 MG/1
8 TABLET, FILM COATED ORAL
Qty: 45 TAB | Refills: 5 | Status: SHIPPED | OUTPATIENT
Start: 2017-12-20 | End: 2018-03-22 | Stop reason: ALTCHOICE

## 2017-12-20 RX ORDER — MIRABEGRON 50 MG/1
TABLET, FILM COATED, EXTENDED RELEASE ORAL
Refills: 12 | COMMUNITY
Start: 2017-12-02 | End: 2018-03-22 | Stop reason: ALTCHOICE

## 2017-12-20 RX ORDER — HYDROCODONE BITARTRATE AND ACETAMINOPHEN 5; 325 MG/1; MG/1
TABLET ORAL
Refills: 0 | COMMUNITY
Start: 2017-11-21 | End: 2018-03-22 | Stop reason: ALTCHOICE

## 2017-12-20 RX ORDER — DEXAMETHASONE 4 MG/1
TABLET ORAL
Qty: 20 TAB | Refills: 0 | Status: SHIPPED | OUTPATIENT
Start: 2017-12-20 | End: 2018-03-22 | Stop reason: ALTCHOICE

## 2017-12-20 RX ORDER — LIDOCAINE AND PRILOCAINE 25; 25 MG/G; MG/G
CREAM TOPICAL
Qty: 30 G | Refills: 3 | Status: SHIPPED | OUTPATIENT
Start: 2017-12-20 | End: 2018-10-16

## 2017-12-20 RX ORDER — PROCHLORPERAZINE MALEATE 10 MG
10 TABLET ORAL
Qty: 50 TAB | Refills: 5 | Status: SHIPPED | OUTPATIENT
Start: 2017-12-20 | End: 2018-03-22 | Stop reason: ALTCHOICE

## 2017-12-20 RX ORDER — ERGOCALCIFEROL 1.25 MG/1
CAPSULE ORAL
Refills: 0 | COMMUNITY
Start: 2017-11-11 | End: 2018-03-22 | Stop reason: ALTCHOICE

## 2017-12-20 RX ORDER — DOCUSATE SODIUM 100 MG/1
CAPSULE, LIQUID FILLED ORAL
Refills: 0 | COMMUNITY
Start: 2017-11-21 | End: 2018-10-16

## 2017-12-20 RX ORDER — OMEPRAZOLE 20 MG/1
CAPSULE, DELAYED RELEASE ORAL
Refills: 1 | COMMUNITY
Start: 2017-11-20 | End: 2018-10-16

## 2017-12-20 RX ORDER — HYDRALAZINE HYDROCHLORIDE 25 MG/1
25 TABLET, FILM COATED ORAL DAILY
COMMUNITY
End: 2018-03-22 | Stop reason: ALTCHOICE

## 2017-12-20 NOTE — PATIENT INSTRUCTIONS
Common Side Effects of Chemotherapy  Decreased Blood Counts Your blood counts can decrease temporarily due to chemotherapy, they will recover over time. This is an expected side effect that your Doctor will be monitoring.  - If you experience fevers (temperature >100.4°F), bleeding or unexplained bruising, please call the office right away   Risk of Infection Your white blood cells can decrease temporarily due to chemotherapy and can put you at higher risk of infection. Washing hands frequently with soap and avoiding sick contacts can reduce your risk of infection.  - If you experience fevers (temperature >100.4°F), shaking chills, or any signs of infection, please call the office immediately   Anemia Chemotherapy can cause your red blood cells to temporarily decrease; this is an expected side effect that your Doctor will be monitoring.  - You may experience fatigue if this occurs, please notify the office if you experience bleeding, shortness of breath with minimal exertion or at rest, rapid heartbeat, or feeling as though you may lose consciousness. Hair Loss Chemotherapy can affect your hair follicles and cause you to lose hair. This can occur on your scalp hair but also all over your body including eyebrows and eye lashes   Nausea  You have been prescribed nausea medication to take if needed. Please follow the directions given to you by your Doctor. - Please call the office if the medications you have been given are not relieving nausea. Vomiting Make sure you are taking anti-nausea medication as prescribed. Eating small amounts of bland foods frequently can help. - Please call the office right away if you are vomiting more than 4 times per day or are unable to keep down food or fluids   Diarrhea Eating small amounts of bland foods frequently can help, increase your fluid intake. It is usually ok to take Imodium for diarrhea.   - Please call the office right away if you experience more than 4 episodes of watery diarrhea or if you are feeling dehydrated. Female patients of childbearing age need to avoid pregnancy during chemotherapy. You can reach Medical Oncology at [address] [place] [address] with further questions or concerns at: [phone].  - Calls during normal business hours will reach our office.  - Calls after hours or on the weekend will reach an answering service and the on-call Oncologist will return your call.

## 2017-12-20 NOTE — PROGRESS NOTES
[facility] Progress Note    Date: 2017    Name: Estrellita Daniel    MRN: [id number]         : 1940      1115. Ms. Stoney Rodriguez Arrived ambulatory and in no distress for [facility]. Assessment was completed, no acute issues at this time, no new complaints voiced. R chest wall port accessed without difficulty by ELPIDIO Quick RN. + blood return. Port de-accessed, flushed & heparinized per protocol. Ms. Kayli Lee vitals were reviewed. Visit Vitals    /67 (BP 1 Location: Left arm, BP Patient Position: Sitting)    Pulse 63    Temp 98.3 °F (36.8 °C)    Resp 18    SpO2 98%       1125. Ms. Stoney Rodriguez tolerated treatment well and was discharged from [place] [address] in stable condition.     Aleah Self RN  2017

## 2017-12-20 NOTE — PROGRESS NOTES
[employer]  Social Work Navigator Encounter     Patient Name:  Radha Gomez    Medical History: Bladder Cancer    Narrative: Met with pt and pt's daughter in-law regarding transportation barriers to treatment. Asked pt if she or her daughter Milagro Mueller were able to secure transportation with [employer]. Pt tells me she was not able to connect with them and she does not think her daughter was able to either. Pt will need transportation a total of four times for chemotherapy treatment. Asked if family or friends would be able to help with transportation. Pt daughter in-law states that she would be able to help. Also provided pt with Cancer Care information to apply for gas assistance if needed. Pt voiced understanding. Provided my contact information again. Plan:   1. Pt's daughter in-law is able to provide transportation to treatment  2. Provided gas assistance resources  3.  Ongoing psychosocial support as needed    -BIJAN Gonzalez

## 2017-12-20 NOTE — PROGRESS NOTES
[facility] at [address], [address] [place] [address]  Burton Dormanter: [phone]  F: [phone]      Reason for Visit:   Ben Chan is a 68 y.o. female who is seen for follow up of bladder cancer. Treatment History:   · Presented with gross hematuria 6/30/2016  · CT A/P 6/30/2016: Serpentine like filling defects present dependent aspect of urinary bladder. No evidence of metastatic disease  · Cystoscopy with Dr. Arvin Araujo 7/11/2016: possible tumor adjacent to bladder diverticulum on right wall of bladder  · Cystoscopy with Dr. Minerva Lock 8/10/2016: Solid-appearing tumor on right bladder wall, about 1cm  · TURBT 8/31/2016: Invasive high-grade papillary urothelial carcinoma. There is at least superficial invasion of muscular propria  · CT Chest 9/20/2016: 4mm RLL noncalcified pulmonary nodule. Subcentimeter hypodensity right hepatic lobe too small to characterize. Left upper pole simple renal cyst 1.7cm. · Stage II (pT2 cN0 M0) Bladder Cancer  · Neoadjuvant chemotherapy with split-dose cisplatin and gemcitabine x2 cycles from 10/18/2016 to 11/15/2016. Stopped due to CVA  · Chemotherapy with Carboplatin and Gemcitabine x4 cycles completed around 5/2017 in [place]  · TURBT 11/20/2017 with Dr. Minerva Lock: high-grade T1 bladder cancer    History of Present Illness:   Here today for follow up and chemotherapy teaching. She has been seen by rad onc in [facility] and plan to return 12/27 for planning. She states she is feeling pretty good. Urinary incontinence persists, wearing depends. Eating and drinking okay. She is accompanied by her daughter-in-law today. PAST HISTORY: The following sections were reviewed and updated in the EMR as appropriate: PMH, SH, FH, Medications, Allergies.       Allergies   Allergen Reactions    Erythromycin Hives    Flagyl [Metronidazole] Hives    Macrobid [Nitrofurantoin Monohyd/M-Cryst] Hives    Pcn [Penicillins] Hives    Sulfa (Sulfonamide Antibiotics) Hives      Review of Systems: A complete review of systems was obtained, reviewed, and scanned into the EMR. Pertinent findings reviewed above. Physical Exam:     Visit Vitals    /67 (BP 1 Location: Left arm, BP Patient Position: Sitting)    Pulse 60    Temp 97.9 °F (36.6 °C) (Oral)    Resp 18    Ht 5' 3\" (1.6 m)    Wt 161 lb (73 kg)    SpO2 98%    BMI 28.52 kg/m2     ECOG PS: 2  General: No distress  Eyes: PERRLA, anicteric sclerae  HENT: Atraumatic, OP clear  Neck: Supple  Lymphatic: No cervical, supraclavicular, or inguinal adenopathy  Respiratory: CTAB, normal respiratory effort  CV: Normal rate, regular rhythm, no murmurs, no peripheral edema  GI: Soft, nontender, nondistended, no masses, no hepatomegaly, no splenomegaly  MS: In wheelchair today. Digits without clubbing or cyanosis. Not able to get onto exam table. Skin: No rashes, ecchymoses, or petechiae. Normal temperature, turgor, and texture. Port in place. Psych: Alert, oriented, appropriate affect, normal judgment/insight    Results:     Lab Results   Component Value Date/Time    WBC 4.7 10/20/2017 12:18 PM    HGB 9.9 10/20/2017 12:18 PM    HCT 32.5 10/20/2017 12:18 PM    PLATELET 176 51/29/4995 12:18 PM    MCV 82.7 10/20/2017 12:18 PM    ABS.  NEUTROPHILS 3.3 10/20/2017 12:18 PM    Hemoglobin (POC) 10.5 11/08/2016 09:48 AM    Hematocrit (POC) 31 11/08/2016 09:48 AM     Lab Results   Component Value Date/Time    Sodium 145 10/20/2017 12:18 PM    Potassium 4.1 10/20/2017 12:18 PM    Chloride 109 10/20/2017 12:18 PM    CO2 32 10/20/2017 12:18 PM    Glucose 87 10/20/2017 12:18 PM    BUN 13 10/20/2017 12:18 PM    Creatinine 0.74 10/20/2017 12:18 PM    GFR est AA >60 10/20/2017 12:18 PM    GFR est non-AA >60 10/20/2017 12:18 PM    Calcium 9.4 10/20/2017 12:18 PM    Sodium (POC) 143 11/08/2016 09:48 AM    Potassium (POC) 3.6 11/08/2016 09:48 AM    Chloride (POC) 104 11/08/2016 09:48 AM    Glucose (POC) 84 11/16/2016 11:13 AM    BUN (POC) 7 11/08/2016 09:48 AM    Creatinine (POC) 0.8 11/08/2016 09:48 AM    Calcium, ionized (POC) 1.18 11/08/2016 09:48 AM     Lab Results   Component Value Date/Time    Bilirubin, total 0.5 10/20/2017 12:18 PM    ALT (SGPT) 16 10/20/2017 12:18 PM    AST (SGOT) 21 10/20/2017 12:18 PM    Alk. phosphatase 82 10/20/2017 12:18 PM    Protein, total 7.8 10/20/2017 12:18 PM    Albumin 3.4 10/20/2017 12:18 PM    Globulin 4.4 10/20/2017 12:18 PM       CT C/A/P 5/30/2017: Decreased size of right posterolateral bladder nodule. Right posterolateral bladder wall thickening adjacent to nodule. Stable tiny pulmonary nodules. CT C/A/P 10/30/2017:   1. Superior right posteriolateral bladder wall mass measuring 1.2 x 1.4 x 1.6 cm suspicious for bladder cancer. 2. 2.5 x 2.5 cm calcification containing soft tissue mass adjacent to left internal iliac artery concerning for metastatic elli disease, unchanged since 6/30/2016. 3. Probable thyroid goiter. Correlation with any prior outside imaging or with thyroid ultrasound is recommended. 4. Heterogeneous portal venous phase enhancement of the posterior aspect of the right lobe of the liver not typical for metastatic disease but of uncertain etiology. Correlate with clinical and laboratory data and consider further evaluation with dedicated hepatic MRI preferred over CT. 5. Additional incidental findings including coronary artery disease and nonobstructed small bowel loop within midline abdominal wall hernia. Assessment:   1) Bladder Cancer  Initially diagnosed with Stage II bladder cancer, received neoadjuvant chemotherapy which was complicated by a CVA. Surgery was therefore not performed. After a several month break she received chemotherapy with Carboplatin and Gemcitabine for 4 cycles in [place]. Plan in [place] had been to proceed next with radiation +/- chemotherapy, but again she was lost to follow up until reestablishing care with me recently. Repeat cystoscopy with Dr. Hallie Shook has confirmed residual bladder tumor, and she has undergone repeat TURBT. Most recent CT scan confirms this finding, along with a stable but suspicious pelvic lymph node and a questionable liver abnormality. We previously discussed radiation with concurrent chemotherapy with 5FU and mitomycin. She has met with radiation oncology in [place] since that is closer to her home with plans to proceed. She has planning next week and they plan to start radiation the first week of January. We discussed the risks and benefits of  Chemotherapy with 5FU and mitomcyin, including potential side effects. These include but are not limited to fatigue, nausea, vomiting, diarrhea, neuropathy, taste changes, allergic reactions, alopecia, mucositis, myelosuppression, risk for infection, infertility and rarely, death. Rarely, a patient may have a condition where they do not metabolize fluorouracil appropriately (called DPD deficiency),  and they may have excessive toxicity. The patient has consented to beginning therapy. Written consent obtained today and patient provided with a copy. We will plan to start 1/8/18 with her first full week of radiation and we will see her back at that time. 2) Indeterminate liver lesion  Unable to get MRI Abd due to pacemaker. We will plan to get triphasic imaging with next scans. 2) DVT, bilateral  Occurred in [place]. Off anticoagulation now. For a malignancy associated thrombus, she may benefit from long-term anticoagulation. Additionally, with her Afib she has an indication for long-term therapy. We will discuss resuming at her next visit. 3) Bradycardia, Afib  S/P pacemaker placement. Previously on apxiaban, then rivaroxaban. Now off anticoagulation. Follow up with cardiology. 4) DM  PCP managing. 5) Port care  Continue port flushes until start of therapy. 6) Urinary incontinence  May worsen with therapy.  Urology following. Plan:     · Follow up with radiation oncology, Dr. Amadeo Loredo, as planned  Plan to proceed on 1/8/18 with FU and Mitomycin  Labs prior to therapy: CBC, CMP, magnesium  Antiemetic prophylaxis: Dexamethasone   PRN antiemetics: Ondansetron, Prochlorperazine  EMLA cream to port  Return clinic in with start of therapy    >50% of this 60 minute visit was spent on counseling/coordination of care regarding the above diagnoses.       Signed By: Khai Hwang MD

## 2017-12-20 NOTE — PROGRESS NOTES
No chief complaint on file. 1. Have you been to the ER, urgent care clinic since your last visit? Hospitalized since your last visit? 11/2017 Patent had a tumor removed from her bladder at [facility] performed by Dr. Israel Gonsales. 2. Have you seen or consulted any other health care providers outside of the [address] [place] [address] since your last visit? Include any pap smears or colon screening.  No

## 2017-12-24 ENCOUNTER — HOSPITAL ENCOUNTER (EMERGENCY)
Age: 77
Discharge: STILL ADMITTED OR ADMINISTRATIVE | End: 2017-12-24
Attending: EMERGENCY MEDICINE
Payer: MEDICARE

## 2017-12-24 PROCEDURE — 75810000275 HC EMERGENCY DEPT VISIT NO LEVEL OF CARE

## 2018-01-03 ENCOUNTER — OP HISTORICAL/CONVERTED ENCOUNTER (OUTPATIENT)
Dept: OTHER | Age: 78
End: 2018-01-03

## 2018-01-03 RX ORDER — DEXAMETHASONE SODIUM PHOSPHATE 4 MG/ML
8 INJECTION, SOLUTION INTRA-ARTICULAR; INTRALESIONAL; INTRAMUSCULAR; INTRAVENOUS; SOFT TISSUE ONCE
Status: COMPLETED | OUTPATIENT
Start: 2018-01-08 | End: 2018-01-08

## 2018-01-03 RX ORDER — SODIUM CHLORIDE 9 MG/ML
25 INJECTION, SOLUTION INTRAVENOUS CONTINUOUS
Status: DISPENSED | OUTPATIENT
Start: 2018-01-08 | End: 2018-01-08

## 2018-01-03 RX ORDER — MITOMYCIN 5 MG/10ML
22 INJECTION, POWDER, LYOPHILIZED, FOR SOLUTION INTRAVENOUS ONCE
Status: COMPLETED | OUTPATIENT
Start: 2018-01-08 | End: 2018-01-08

## 2018-01-08 ENCOUNTER — HOSPITAL ENCOUNTER (OUTPATIENT)
Dept: INFUSION THERAPY | Age: 78
Discharge: HOME | End: 2018-01-08
Payer: MEDICARE

## 2018-01-08 ENCOUNTER — OFFICE VISIT (OUTPATIENT)
Dept: ONCOLOGY | Age: 78
End: 2018-01-08

## 2018-01-08 VITALS
HEART RATE: 63 BPM | DIASTOLIC BLOOD PRESSURE: 57 MMHG | RESPIRATION RATE: 20 BRPM | OXYGEN SATURATION: 99 % | TEMPERATURE: 98.3 F | SYSTOLIC BLOOD PRESSURE: 154 MMHG

## 2018-01-08 VITALS
WEIGHT: 158 LBS | OXYGEN SATURATION: 98 % | SYSTOLIC BLOOD PRESSURE: 143 MMHG | DIASTOLIC BLOOD PRESSURE: 56 MMHG | BODY MASS INDEX: 28 KG/M2 | HEIGHT: 63 IN | TEMPERATURE: 98.4 F | HEART RATE: 60 BPM | RESPIRATION RATE: 16 BRPM

## 2018-01-08 DIAGNOSIS — I82.4Y3 ACUTE DEEP VEIN THROMBOSIS (DVT) OF PROXIMAL VEIN OF BOTH LOWER EXTREMITIES (HCC): ICD-10-CM

## 2018-01-08 DIAGNOSIS — I48.20 CHRONIC ATRIAL FIBRILLATION (HCC): ICD-10-CM

## 2018-01-08 DIAGNOSIS — I63.9 ACUTE CVA (CEREBROVASCULAR ACCIDENT) (HCC): ICD-10-CM

## 2018-01-08 DIAGNOSIS — C67.9 MALIGNANT NEOPLASM OF URINARY BLADDER, UNSPECIFIED SITE (HCC): Primary | ICD-10-CM

## 2018-01-08 LAB
ALBUMIN SERPL-MCNC: 3.4 G/DL (ref 3.5–5)
ALBUMIN/GLOB SERPL: 1 {RATIO} (ref 1.1–2.2)
ALP SERPL-CCNC: 83 U/L (ref 45–117)
ALT SERPL-CCNC: 15 U/L (ref 12–78)
ANION GAP SERPL CALC-SCNC: 10 MMOL/L (ref 5–15)
AST SERPL-CCNC: 24 U/L (ref 15–37)
BASO+EOS+MONOS # BLD AUTO: 0.3 K/UL (ref 0.2–1.2)
BASO+EOS+MONOS # BLD AUTO: 8 % (ref 3.2–16.9)
BILIRUB DIRECT SERPL-MCNC: 0.2 MG/DL (ref 0–0.2)
BILIRUB SERPL-MCNC: 0.5 MG/DL (ref 0.2–1)
BUN SERPL-MCNC: 12 MG/DL (ref 6–20)
BUN/CREAT SERPL: 14 (ref 12–20)
CALCIUM SERPL-MCNC: 9 MG/DL (ref 8.5–10.1)
CHLORIDE SERPL-SCNC: 108 MMOL/L (ref 97–108)
CO2 SERPL-SCNC: 29 MMOL/L (ref 21–32)
CREAT SERPL-MCNC: 0.88 MG/DL (ref 0.55–1.02)
DIFFERENTIAL METHOD BLD: ABNORMAL
ERYTHROCYTE [DISTWIDTH] IN BLOOD BY AUTOMATED COUNT: 17.9 % (ref 11.8–15.8)
GLOBULIN SER CALC-MCNC: 3.4 G/DL (ref 2–4)
GLUCOSE SERPL-MCNC: 136 MG/DL (ref 65–100)
HCT VFR BLD AUTO: 29.8 % (ref 35–47)
HGB BLD-MCNC: 9.5 G/DL (ref 11.5–16)
LYMPHOCYTES # BLD: 0.4 K/UL (ref 0.8–3.5)
LYMPHOCYTES NFR BLD: 11 % (ref 12–49)
MCH RBC QN AUTO: 26.3 PG (ref 26–34)
MCHC RBC AUTO-ENTMCNC: 31.9 G/DL (ref 30–36.5)
MCV RBC AUTO: 82.5 FL (ref 80–99)
NEUTS SEG # BLD: 3.1 K/UL (ref 1.8–8)
NEUTS SEG NFR BLD: 81 % (ref 32–75)
PLATELET # BLD AUTO: 182 K/UL (ref 150–400)
POTASSIUM SERPL-SCNC: 3.3 MMOL/L (ref 3.5–5.1)
PROT SERPL-MCNC: 6.8 G/DL (ref 6.4–8.2)
RBC # BLD AUTO: 3.61 M/UL (ref 3.8–5.2)
SODIUM SERPL-SCNC: 147 MMOL/L (ref 136–145)
WBC # BLD AUTO: 3.8 K/UL (ref 3.6–11)

## 2018-01-08 PROCEDURE — 74011000250 HC RX REV CODE- 250: Performed by: INTERNAL MEDICINE

## 2018-01-08 PROCEDURE — 80048 BASIC METABOLIC PNL TOTAL CA: CPT | Performed by: INTERNAL MEDICINE

## 2018-01-08 PROCEDURE — 80076 HEPATIC FUNCTION PANEL: CPT | Performed by: INTERNAL MEDICINE

## 2018-01-08 PROCEDURE — 96416 CHEMO PROLONG INFUSE W/PUMP: CPT

## 2018-01-08 PROCEDURE — 85025 COMPLETE CBC W/AUTO DIFF WBC: CPT | Performed by: INTERNAL MEDICINE

## 2018-01-08 PROCEDURE — 36415 COLL VENOUS BLD VENIPUNCTURE: CPT | Performed by: INTERNAL MEDICINE

## 2018-01-08 PROCEDURE — 74011000258 HC RX REV CODE- 258: Performed by: INTERNAL MEDICINE

## 2018-01-08 PROCEDURE — 74011250636 HC RX REV CODE- 250/636: Performed by: INTERNAL MEDICINE

## 2018-01-08 PROCEDURE — 77030012965 HC NDL HUBR BBMI -A

## 2018-01-08 PROCEDURE — 96375 TX/PRO/DX INJ NEW DRUG ADDON: CPT

## 2018-01-08 PROCEDURE — 96409 CHEMO IV PUSH SNGL DRUG: CPT

## 2018-01-08 RX ORDER — FAMOTIDINE 10 MG/ML
INJECTION INTRAVENOUS
Status: DISPENSED
Start: 2018-01-08 | End: 2018-01-09

## 2018-01-08 RX ORDER — HEPARIN 100 UNIT/ML
500 SYRINGE INTRAVENOUS AS NEEDED
Status: ACTIVE | OUTPATIENT
Start: 2018-01-08 | End: 2018-01-09

## 2018-01-08 RX ORDER — DIPHENHYDRAMINE HYDROCHLORIDE 50 MG/ML
INJECTION, SOLUTION INTRAMUSCULAR; INTRAVENOUS
Status: DISPENSED
Start: 2018-01-08 | End: 2018-01-09

## 2018-01-08 RX ORDER — SODIUM CHLORIDE 9 MG/ML
10 INJECTION INTRAMUSCULAR; INTRAVENOUS; SUBCUTANEOUS AS NEEDED
Status: DISPENSED | OUTPATIENT
Start: 2018-01-08 | End: 2018-01-09

## 2018-01-08 RX ORDER — SODIUM CHLORIDE 0.9 % (FLUSH) 0.9 %
10 SYRINGE (ML) INJECTION AS NEEDED
Status: ACTIVE | OUTPATIENT
Start: 2018-01-08 | End: 2018-01-09

## 2018-01-08 RX ADMIN — Medication 10 ML: at 12:24

## 2018-01-08 RX ADMIN — DEXAMETHASONE SODIUM PHOSPHATE 8 MG: 4 INJECTION, SOLUTION INTRA-ARTICULAR; INTRALESIONAL; INTRAMUSCULAR; INTRAVENOUS; SOFT TISSUE at 12:24

## 2018-01-08 RX ADMIN — MITOMYCIN 22 MG: 20 INJECTION, POWDER, LYOPHILIZED, FOR SOLUTION INTRAVENOUS at 13:47

## 2018-01-08 RX ADMIN — FLUOROURACIL 4500 MG: 50 INJECTION, SOLUTION INTRAVENOUS at 14:48

## 2018-01-08 RX ADMIN — SODIUM CHLORIDE 10 ML: 9 INJECTION INTRAMUSCULAR; INTRAVENOUS; SUBCUTANEOUS at 12:23

## 2018-01-08 RX ADMIN — SODIUM CHLORIDE 25 ML/HR: 900 INJECTION, SOLUTION INTRAVENOUS at 12:23

## 2018-01-08 NOTE — PROGRESS NOTES
HPI: Jennyfer Mahmood is a 68 y.o. female diagnosed with bladder cancer here today for Cycle 1, Day 1 chemotherapy counseling. Ms. Cristian Palmer is being treated with mitomycin 12 mg/m2 given on day 1 and fluorouracil CIV over 120 hours given on day 1 and 22 of each 28 day cycle per Franklyn Garnett [phone]. Education on medications included in the regimen was provided to the patient. Topics covered included, but were not limited to: myelosuppression, nausea and vomiting, alopecia, avoidance of herbal supplements and/or NSAIDs, management of constipation/diarrhea, and oral hygiene/management of mouth sores. Ms. Cristian Palmer verbalized understanding of the information provided and denied any additional questions or concerns. Thank you for the opportunity to work with Ms. Cristian Palmer. Michael Lomax, PharmD, BCOP    Time spent with the patient:  30 minutes  Time spent documenting: 10 minutes    Current Outpatient Prescriptions   Medication Sig    MYRBETRIQ 50 mg ER tablet TAKE 1 TABLET BY MOUTH DAILY    HYDROcodone-acetaminophen (NORCO) 5-325 mg per tablet TAKE 1 TABLET BY MOUTH EVERY 4 HOURS AS NEEDED FOR PAIN    docusate sodium (COLACE) 100 mg capsule TAKE 1 CAPSULE BY MOUTH TWICE A DAY    omeprazole (PRILOSEC) 20 mg capsule TAKE ONE CAPSULE EVERY DAY    ergocalciferol (ERGOCALCIFEROL) 50,000 unit capsule TAKE ONE CAPSULE BY MOUTH WEEKLY    hydrALAZINE (APRESOLINE) 25 mg tablet Take 25 mg by mouth three (3) times daily.  prochlorperazine (COMPAZINE) 10 mg tablet Take 1 Tab by mouth every six (6) hours as needed for Nausea.  dexamethasone (DECADRON) 4 mg tablet Take 8mg (two tabs) in the morning days 2-5 of each chemotherapy cycle, while the pump is running.  ondansetron hcl (ZOFRAN) 8 mg tablet Take 1 Tab by mouth every eight (8) hours as needed for Nausea.  lidocaine-prilocaine (EMLA) topical cream Apply to port 30-60 minutes prior to accessing, cover with plastic wrap.     phosphorus (PHOSPHA 250 NEUTRAL) 250 mg tablet Take 1 Tab by mouth two (2) times a day.  metoprolol tartrate (LOPRESSOR) 50 mg tablet Take 1 Tab by mouth two (2) times a day.  pravastatin (PRAVACHOL) 80 mg tablet Take 1 Tab by mouth nightly.  pantoprazole (PROTONIX) 40 mg tablet Take 40 mg by mouth daily.  losartan (COZAAR) 50 mg tablet Take 50 mg by mouth two (2) times a day.  methimazole (TAPAZOLE) 5 mg tablet Take 0.5 Tabs by mouth daily.  amiodarone (CORDARONE) 200 mg tablet Take 100 mg by mouth daily.      Current Facility-Administered Medications   Medication Dose Route Frequency    sodium chloride 0.9 % injection 10 mL  10 mL IntraVENous PRN    heparin (porcine) pf 500 Units  500 Units IntraVENous PRN    sodium chloride (NS) flush 10 mL  10 mL IntraVENous PRN    mitoMYcin (MUTAMYCIN) chemo syringe 22 mg  22 mg IntraVENous ONCE    fluorouracil (ADRUCIL) 4,500 mg in 0.9% sodium chloride 100 mL CADD Cassette  4,500 mg IntraVENous ONCE    0.9% sodium chloride infusion  25 mL/hr IntraVENous CONTINUOUS    dexamethasone (DECADRON) 4 mg/mL injection 8 mg  8 mg IntraVENous ONCE    prochlorperazine (COMPAZINE) with saline injection 10 mg  10 mg IntraVENous Q6H PRN

## 2018-01-08 NOTE — PROGRESS NOTES

## 2018-01-08 NOTE — PROGRESS NOTES
[facility] [place] at [address], [address] [place] [address] [place]: [phone]  F: [phone]      Reason for Visit:   Jeff Lin is a 68 y.o. female who is seen for follow up of bladder cancer. Treatment History:   · Presented with gross hematuria 6/30/2016  · CT A/P 6/30/2016: Serpentine like filling defects present dependent aspect of urinary bladder. No evidence of metastatic disease  · Cystoscopy with Dr. Monica To 7/11/2016: possible tumor adjacent to bladder diverticulum on right wall of bladder  · Cystoscopy with Dr. Laurie Simpson 8/10/2016: Solid-appearing tumor on right bladder wall, about 1cm  · TURBT 8/31/2016: Invasive high-grade papillary urothelial carcinoma. There is at least superficial invasion of muscular propria  · CT Chest 9/20/2016: 4mm RLL noncalcified pulmonary nodule. Subcentimeter hypodensity right hepatic lobe too small to characterize. Left upper pole simple renal cyst 1.7cm. · Stage II (pT2 cN0 M0) Bladder Cancer  · Neoadjuvant chemotherapy with split-dose cisplatin and gemcitabine x2 cycles from 10/18/2016 to 11/15/2016. Stopped due to CVA  · Chemotherapy with Carboplatin and Gemcitabine x4 cycles completed around 5/2017 in [place]  · TURBT 11/20/2017 with Dr. Laurie Simpson: high-grade T1 bladder cancer  · Radiation with Dr. Danny Quintero beginning 1/2/2018  · Concurrent chemotherapy with 5FU and mitomycin beginning 1/8/2018    History of Present Illness:   Here today for follow up and start of chemotherapy. She reports feeling okay. She states she started radiation last week and that has been going well. Had radiation already this morning. Incontinence stable. No new complaints. She is accompanied by 2 family members today. PAST HISTORY: The following sections were reviewed and updated in the EMR as appropriate: PMH, SH, FH, Medications, Allergies.       Allergies   Allergen Reactions    Erythromycin Hives    Flagyl [Metronidazole] Hives    Macrobid [Nitrofurantoin Monohyd/M-Cryst] Hives    Pcn [Penicillins] Hives    Sulfa (Sulfonamide Antibiotics) Hives      Review of Systems: A complete review of systems was obtained, reviewed, and scanned into the EMR. Pertinent findings reviewed above. Physical Exam:     Visit Vitals    /57 (BP 1 Location: Left arm, BP Patient Position: Sitting)    Pulse 63    Temp 98.3 °F (36.8 °C) (Temporal)    Resp 20    SpO2 99%     ECOG PS: 2  General: No distress  Eyes: PERRLA, anicteric sclerae  HENT: Atraumatic, OP clear  Neck: Supple  Lymphatic: No cervical, supraclavicular, or inguinal adenopathy  Respiratory: CTAB, normal respiratory effort  CV: Normal rate, regular rhythm, no murmurs, no peripheral edema  GI: Soft, nontender, nondistended, no masses, no hepatomegaly, no splenomegaly  MS: In wheelchair today. Digits without clubbing or cyanosis. Not able to get onto exam table. Skin: No rashes, ecchymoses, or petechiae. Normal temperature, turgor, and texture. Port in place. Psych: Alert, oriented, appropriate affect, normal judgment/insight    Results:     Lab Results   Component Value Date/Time    WBC 3.8 01/08/2018 10:45 AM    HGB 9.5 01/08/2018 10:45 AM    HCT 29.8 01/08/2018 10:45 AM    PLATELET 751 97/13/9464 10:45 AM    MCV 82.5 01/08/2018 10:45 AM    ABS.  NEUTROPHILS 3.1 01/08/2018 10:45 AM    Hemoglobin (POC) 10.5 11/08/2016 09:48 AM    Hematocrit (POC) 31 11/08/2016 09:48 AM     Lab Results   Component Value Date/Time    Sodium 147 01/08/2018 10:45 AM    Potassium 3.3 01/08/2018 10:45 AM    Chloride 108 01/08/2018 10:45 AM    CO2 29 01/08/2018 10:45 AM    Glucose 136 01/08/2018 10:45 AM    BUN 12 01/08/2018 10:45 AM    Creatinine 0.88 01/08/2018 10:45 AM    GFR est AA >60 01/08/2018 10:45 AM    GFR est non-AA >60 01/08/2018 10:45 AM    Calcium 9.0 01/08/2018 10:45 AM    Sodium (POC) 143 11/08/2016 09:48 AM    Potassium (POC) 3.6 11/08/2016 09:48 AM    Chloride (POC) 104 11/08/2016 09:48 AM    Glucose (POC) 84 11/16/2016 11:13 AM    BUN (POC) 7 11/08/2016 09:48 AM    Creatinine (POC) 0.8 11/08/2016 09:48 AM    Calcium, ionized (POC) 1.18 11/08/2016 09:48 AM     Lab Results   Component Value Date/Time    Bilirubin, total 0.5 01/08/2018 10:45 AM    ALT (SGPT) 15 01/08/2018 10:45 AM    AST (SGOT) 24 01/08/2018 10:45 AM    Alk. phosphatase 83 01/08/2018 10:45 AM    Protein, total 6.8 01/08/2018 10:45 AM    Albumin 3.4 01/08/2018 10:45 AM    Globulin 3.4 01/08/2018 10:45 AM       CT C/A/P 5/30/2017: Decreased size of right posterolateral bladder nodule. Right posterolateral bladder wall thickening adjacent to nodule. Stable tiny pulmonary nodules. CT C/A/P 10/30/2017:   1. Superior right posteriolateral bladder wall mass measuring 1.2 x 1.4 x 1.6 cm suspicious for bladder cancer. 2. 2.5 x 2.5 cm calcification containing soft tissue mass adjacent to left internal iliac artery concerning for metastatic elli disease, unchanged since 6/30/2016. 3. Probable thyroid goiter. Correlation with any prior outside imaging or with thyroid ultrasound is recommended. 4. Heterogeneous portal venous phase enhancement of the posterior aspect of the right lobe of the liver not typical for metastatic disease but of uncertain etiology. Correlate with clinical and laboratory data and consider further evaluation with dedicated hepatic MRI preferred over CT. 5. Additional incidental findings including coronary artery disease and nonobstructed small bowel loop within midline abdominal wall hernia. Assessment:   1) Bladder Cancer  Initially diagnosed with Stage II bladder cancer, received neoadjuvant chemotherapy which was complicated by a CVA. Surgery was therefore not performed. After a several month break she received chemotherapy with Carboplatin and Gemcitabine for 4 cycles in [place].   Plan in [place] had been to proceed next with radiation +/- chemotherapy, but again she was lost to follow up until reestablishing care with me recently. Repeat cystoscopy with Dr. Mita Patricio has confirmed residual bladder tumor, and she has undergone repeat TURBT. Most recent CT scan confirms this finding, along with a stable but suspicious pelvic lymph node and a questionable liver abnormality. We previously discussed radiation with concurrent chemotherapy with 5FU and mitomycin and patient has consented to therapy. She started radiation last week. We will proceed with therapy today and see her back next week with pump disconnect. 2) Indeterminate liver lesion  Unable to get MRI Abd due to pacemaker. We will plan to get triphasic imaging with next scans. 2) DVT, bilateral  Occurred in [place]. Off anticoagulation now. For a malignancy associated thrombus, she may benefit from long-term anticoagulation. Additionally, with her Afib she has an indication for long-term therapy. We will discuss resuming at her next visit. 3) Bradycardia, Afib  S/P pacemaker placement. Previously on apxiaban, then rivaroxaban. Now off anticoagulation. Follow up with cardiology. 4) DM  PCP managing. 5) Port care  She will need port flushes once therapy is complete. 6) Urinary incontinence  May worsen with therapy. Urology following.      Plan:     Proceed with radiation with Dr. Jayde Boston today with cycle #1 FU and Mitomycin  Labs prior to therapy: CBC, CMP, magnesium  Antiemetic prophylaxis: Dexamethasone   PRN antiemetics: Ondansetron, Prochlorperazine  Return clinic next week with pump d/c         Signed By: Delroy Vieira MD

## 2018-01-08 NOTE — PROGRESS NOTES
[facility] - Chemotherapy Progress Note    1020 Pt admit to [facility] for C 1 Mitomycin/5 FU CADD ambulatory in stable condition. Assessment completed. No new concerns voiced. PAC with positive blood return. Chemotherapy Flowsheet 11/15/2016   Cycle C2D8   Date 11/15/2016   Drug / Regimen Gemzar/Cisplatin   Pre Hydration -   Post Hydration -   Notes HELD       Visit Vitals    /56    Pulse 60    Temp 98.4 °F (36.9 °C)    Resp 16    Ht 5' 2.99\" (1.6 m)    Wt 71.7 kg (158 lb)    SpO2 98%    Breastfeeding No    BMI 28 kg/m2     Pt declines pregnancy. Pt left for MD appointment @ [phone] and returned with no change to treatment plan. Jocelyne Springer Pharmacist reviewed medications, side effects and when to call the MD. RN reinforced information as well as reviewed CADD instructions. Medications:  Dexamethasone IVP  Mitomycin IV  5FU CADD CI over 5 days    1500 Pt tolerated treatment well. PAC maintained positive blood return throughout treatment, flushed with positive blood return at conclusion and throughout. Rodriges needle intact for home 5 day treatment D/c home ambulatory in no distress. Pt aware of next appointment scheduled for 1/15/18 @ 3 pm (per MD request)   . Recent Results (from the past 12 hour(s))   CBC WITH 3 PART DIFF    Collection Time: 01/08/18 10:45 AM   Result Value Ref Range    WBC 3.8 3.6 - 11.0 K/uL    RBC 3.61 (L) 3.80 - 5.20 M/uL    HGB 9.5 (L) 11.5 - 16.0 g/dL    HCT 29.8 (L) 35.0 - 47.0 %    MCV 82.5 80.0 - 99.0 FL    MCH 26.3 26.0 - 34.0 PG    MCHC 31.9 30.0 - 36.5 g/dL    RDW 17.9 (H) 11.8 - 15.8 %    PLATELET 795 251 - 412 K/uL    NEUTROPHILS 81 (H) 32 - 75 %    MIXED CELLS 8 3.2 - 16.9 %    LYMPHOCYTES 11 (L) 12 - 49 %    ABS. NEUTROPHILS 3.1 1.8 - 8.0 K/UL    ABS. MIXED CELLS 0.3 0.2 - 1.2 K/uL    ABS.  LYMPHOCYTES 0.4 (L) 0.8 - 3.5 K/UL    DF AUTOMATED     METABOLIC PANEL, BASIC    Collection Time: 01/08/18 10:45 AM   Result Value Ref Range    Sodium 147 (H) 136 - 145 mmol/L    Potassium 3.3 (L) 3.5 - 5.1 mmol/L    Chloride 108 97 - 108 mmol/L    CO2 29 21 - 32 mmol/L    Anion gap 10 5 - 15 mmol/L    Glucose 136 (H) 65 - 100 mg/dL    BUN 12 6 - 20 MG/DL    Creatinine 0.88 0.55 - 1.02 MG/DL    BUN/Creatinine ratio 14 12 - 20      GFR est AA >60 >60 ml/min/1.73m2    GFR est non-AA >60 >60 ml/min/1.73m2    Calcium 9.0 8.5 - 10.1 MG/DL   HEPATIC FUNCTION PANEL    Collection Time: 01/08/18 10:45 AM   Result Value Ref Range    Protein, total 6.8 6.4 - 8.2 g/dL    Albumin 3.4 (L) 3.5 - 5.0 g/dL    Globulin 3.4 2.0 - 4.0 g/dL    A-G Ratio 1.0 (L) 1.1 - 2.2      Bilirubin, total 0.5 0.2 - 1.0 MG/DL    Bilirubin, direct 0.2 0.0 - 0.2 MG/DL    Alk.  phosphatase 83 45 - 117 U/L    AST (SGOT) 24 15 - 37 U/L    ALT (SGPT) 15 12 - 78 U/L

## 2018-01-09 ENCOUNTER — TELEPHONE (OUTPATIENT)
Dept: ONCOLOGY | Age: 78
End: 2018-01-09

## 2018-01-09 NOTE — TELEPHONE ENCOUNTER
Pt called stating Tyrajeni Elizondo stated she would sent a prescription for a \"little blue pill\" to her pharmacy. She would like to confirm this was sent.  Call back number [phone]

## 2018-01-09 NOTE — TELEPHONE ENCOUNTER
[facility] [employer] at [place] [address]  [phone]    01/09/18- Patient stated she realized she already has dexamethasone and that was \"the little blue pill\" she was referring to. Reviewed directions for how to take dexamethasone with chemotherapy, and instructions to take batteries out of the pump when her infusion is complete on Saturday. Patient verbalized understanding, no further questions or concerns.

## 2018-01-12 ENCOUNTER — IP HISTORICAL/CONVERTED ENCOUNTER (OUTPATIENT)
Dept: OTHER | Age: 78
End: 2018-01-12

## 2018-01-12 ENCOUNTER — APPOINTMENT (OUTPATIENT)
Dept: INFUSION THERAPY | Age: 78
End: 2018-01-12
Payer: MEDICARE

## 2018-01-16 ENCOUNTER — TELEPHONE (OUTPATIENT)
Dept: ONCOLOGY | Age: 78
End: 2018-01-16

## 2018-01-16 NOTE — TELEPHONE ENCOUNTER
Left vm for Annamarie Griffin,  at [facility], to provide information about patent's upcomming treatment. Requested a call back.

## 2018-01-17 ENCOUNTER — ED HISTORICAL/CONVERTED ENCOUNTER (OUTPATIENT)
Dept: OTHER | Age: 78
End: 2018-01-17

## 2018-02-01 ENCOUNTER — OP HISTORICAL/CONVERTED ENCOUNTER (OUTPATIENT)
Dept: OTHER | Age: 78
End: 2018-02-01

## 2018-02-05 ENCOUNTER — HOSPITAL ENCOUNTER (OUTPATIENT)
Dept: INFUSION THERAPY | Age: 78
End: 2018-02-05

## 2018-02-09 ENCOUNTER — APPOINTMENT (OUTPATIENT)
Dept: INFUSION THERAPY | Age: 78
End: 2018-02-09

## 2018-02-12 ENCOUNTER — APPOINTMENT (OUTPATIENT)
Dept: INFUSION THERAPY | Age: 78
End: 2018-02-12

## 2018-02-22 ENCOUNTER — TELEPHONE (OUTPATIENT)
Dept: ONCOLOGY | Age: 78
End: 2018-02-22

## 2018-02-22 NOTE — TELEPHONE ENCOUNTER
[employer]  Social Work Navigator Encounter     2/22 Follow-up call placed to pt's daughter, Sherry Elliott (verified). Left vm requesting a return call to discussed if pt has been discharged from rehab and to schedule appointment with Dr. Lilly Kent.

## 2018-03-02 ENCOUNTER — OFFICE VISIT (OUTPATIENT)
Dept: ONCOLOGY | Age: 78
End: 2018-03-02

## 2018-03-02 VITALS
SYSTOLIC BLOOD PRESSURE: 155 MMHG | TEMPERATURE: 97.8 F | DIASTOLIC BLOOD PRESSURE: 61 MMHG | HEART RATE: 62 BPM | BODY MASS INDEX: 28 KG/M2 | WEIGHT: 158 LBS | OXYGEN SATURATION: 100 % | RESPIRATION RATE: 16 BRPM | HEIGHT: 63 IN

## 2018-03-02 DIAGNOSIS — I10 ESSENTIAL HYPERTENSION: ICD-10-CM

## 2018-03-02 DIAGNOSIS — C67.9 MALIGNANT NEOPLASM OF URINARY BLADDER, UNSPECIFIED SITE (HCC): Primary | ICD-10-CM

## 2018-03-02 DIAGNOSIS — I82.4Y3 ACUTE DEEP VEIN THROMBOSIS (DVT) OF PROXIMAL VEIN OF BOTH LOWER EXTREMITIES (HCC): ICD-10-CM

## 2018-03-02 DIAGNOSIS — Z95.0 PACEMAKER: ICD-10-CM

## 2018-03-02 DIAGNOSIS — I48.20 CHRONIC ATRIAL FIBRILLATION (HCC): ICD-10-CM

## 2018-03-02 DIAGNOSIS — R93.2 ABNORMAL FINDINGS ON DIAGNOSTIC IMAGING OF LIVER: ICD-10-CM

## 2018-03-02 RX ORDER — DEXTROMETHORPHAN HYDROBROMIDE, GUAIFENESIN 5; 100 MG/5ML; MG/5ML
650 LIQUID ORAL
COMMUNITY

## 2018-03-02 RX ORDER — GLUCOSAMINE SULFATE 1500 MG
POWDER IN PACKET (EA) ORAL DAILY
COMMUNITY
End: 2018-10-16

## 2018-03-02 NOTE — MR AVS SNAPSHOT
[address], [address] [place] [address] Road 
[phone] Patient: Tyrese Wilson MRN: [id number] MAIKOL:[id number] Visit Information Date & Time Provider Department Dept. Phone Encounter #  
 3/2/2018  9:45 AM Iliana Early NP [address] at [address] [id number] Follow-up Instructions Return for 3 weeks, Tim espitia. Your Appointments 3/22/2018  9:45 AM  
ESTABLISHED PATIENT with Iliana Early NP  
[facility] at [facility]) Appt Note: 3 wk Claire espitia/tim [address] [place] [address], [address] [place] [address] [address] [address]  
[phone]  
  
   
 [address] [place] [address] Upcoming Health Maintenance Date Due  
 FOOT EXAM Q1 6/26/1950 MICROALBUMIN Q1 6/26/1950 COLONOSCOPY 6/26/1958 DTaP/Tdap/Td series (1 - Tdap) 6/26/1961 ZOSTER VACCINE AGE 60> 4/26/2000 OSTEOPOROSIS SCREENING (DEXA) 6/26/2005 Pneumococcal 65+ High/Highest Risk (1 of 2 - PCV13) 6/26/2005 MEDICARE YEARLY EXAM 6/26/2005 EYE EXAM RETINAL OR DILATED Q1 11/30/2015 GLAUCOMA SCREENING Q2Y 11/30/2016 LIPID PANEL Q1 1/18/2017 HEMOGLOBIN A1C Q6M 5/15/2017 Influenza Age 5 to Adult 8/1/2017 Allergies as of 3/2/2018  Review Complete On: 3/2/2018 By: Steve Valero Severity Noted Reaction Type Reactions Erythromycin  11/28/2012    Hives Flagyl [Metronidazole]  11/28/2012    Hives Macrobid [Nitrofurantoin Monohyd/m-cryst]  10/10/2016    Hives Pcn [Penicillins]  09/09/2016    Hives Sulfa (Sulfonamide Antibiotics)  11/28/2012    Hives Current Immunizations  Reviewed on 1/8/2018 Name Date Influenza Vaccine 10/18/2015 Not reviewed this visit You Were Diagnosed With   
  
 Codes Comments Malignant neoplasm of urinary bladder, unspecified site [facility])    -  Primary ICD-10-CM: C67.9 ICD-9-CM: 188. 9 Chronic atrial fibrillation (HCC)     ICD-10-CM: G35.3 ICD-9-CM: 427.31 Acute deep vein thrombosis (DVT) of proximal vein of both lower extremities (HCC)     ICD-10-CM: I82.4Y3 ICD-9-CM: 453.41 Pacemaker     ICD-10-CM: Z95.0 ICD-9-CM: V45.01 Abnormal findings on diagnostic imaging of liver     ICD-10-CM: R93.2 ICD-9-CM: 793.3 Essential hypertension     ICD-10-CM: I10 
ICD-9-CM: 401.9 Vitals BP Pulse Temp Resp Height(growth percentile) Weight(growth percentile) 155/61 (BP 1 Location: Right arm, BP Patient Position: Sitting) 62 97.8 °F (36.6 °C) (Oral) 16 5' 2.99\" (1.6 m) 158 lb (71.7 kg) SpO2 BMI OB Status Smoking Status 100% 28 kg/m2 Hysterectomy Never Smoker BMI and BSA Data Body Mass Index Body Surface Area  
 28 kg/m 2 1.79 m 2 Preferred Pharmacy Pharmacy Name Phone [id number] [place] [phone] Your Updated Medication List  
  
   
This list is accurate as of 3/2/18 11:16 AM.  Always use your most recent med list.  
  
  
  
  
 acetaminophen 650 mg Tber Commonly known as:  TYLENOL Take 650 mg by mouth every eight (8) hours. amiodarone 200 mg tablet Commonly known as:  CORDARONE Take 200 mg by mouth daily. dexamethasone 4 mg tablet Commonly known as:  DECADRON Take 8mg (two tabs) in the morning days 2-5 of each chemotherapy cycle, while the pump is running. docusate sodium 100 mg capsule Commonly known as:  COLACE  
TAKE 1 CAPSULE BY MOUTH TWICE A DAY  
  
 ergocalciferol 50,000 unit capsule Commonly known as:  ERGOCALCIFEROL  
TAKE ONE CAPSULE BY MOUTH WEEKLY  
  
 hydrALAZINE 25 mg tablet Commonly known as:  APRESOLINE Take 25 mg by mouth daily. HYDROcodone-acetaminophen 5-325 mg per tablet Commonly known as:  NORCO  
TAKE 1 TABLET BY MOUTH EVERY 4 HOURS AS NEEDED FOR PAIN  
  
 lidocaine-prilocaine topical cream  
 Commonly known as:  EMLA Apply to port 30-60 minutes prior to accessing, cover with plastic wrap.  
  
 losartan 50 mg tablet Commonly known as:  COZAAR Take 50 mg by mouth two (2) times a day. methIMAzole 5 mg tablet Commonly known as:  TAPAZOLE Take 0.5 Tabs by mouth daily. metoprolol tartrate 50 mg tablet Commonly known as:  LOPRESSOR Take 1 Tab by mouth two (2) times a day. MYRBETRIQ 50 mg ER tablet Generic drug:  mirabegron ER  
TAKE 1 TABLET BY MOUTH DAILY  
  
 omeprazole 20 mg capsule Commonly known as:  PRILOSEC  
TAKE ONE CAPSULE EVERY DAY  
  
 ondansetron hcl 8 mg tablet Commonly known as:  Ami Gell Take 1 Tab by mouth every eight (8) hours as needed for Nausea. pantoprazole 40 mg tablet Commonly known as:  PROTONIX Take 40 mg by mouth daily. PHOSPHA 250 NEUTRAL 250 mg tablet Generic drug:  phosphorus Take 1 Tab by mouth two (2) times a day. pravastatin 80 mg tablet Commonly known as:  PRAVACHOL Take 1 Tab by mouth nightly. prochlorperazine 10 mg tablet Commonly known as:  COMPAZINE Take 1 Tab by mouth every six (6) hours as needed for Nausea. VITAMIN D3 1,000 unit Cap Generic drug:  cholecalciferol Take  by mouth daily. We Performed the Following REFERRAL TO PALLIATIVE MEDICINE [YCZ808 Custom] Comments:  
 Bladder ca. No longer candidate for chemotherapy. Referral for management of symptoms. Follow-up Instructions Return for 3 weeks, Jhonny espitia. Referral Information Referral ID Referred By Referred To  
  
 [id number] Cari Jorgensen MD   
   [address] [facility] [address], [address] Phone: [phone] Fax: [phone] Visits Status Start Date End Date 1 New Request 3/2/18 3/2/19 If your referral has a status of pending review or denied, additional information will be sent to support the outcome of this decision. Patient Instructions Dr. Decker [facility] [phone] [address] [place] [address] Introducing \[facility]\"" & HEALTH SERVICES! Dear Sara Lynch: Thank you for requesting a [employer] account. Our records indicate that you already have an active [employer] account. You can access your account anytime at [email or web address] Did you know that you can access your hospital and ER discharge instructions at any time in [employer]? You can also review all of your test results from your hospital stay or ER visit. Additional Information If you have questions, please visit the Frequently Asked Questions section of the [employer] website at [email or web address] Remember, [employer] is NOT to be used for urgent needs. For medical emergencies, dial 911. Now available from your iPhone and Android! Please provide this summary of care documentation to your next provider. Your primary care clinician is listed as Nahum Pat. If you have any questions after today's visit, please call [phone].

## 2018-03-02 NOTE — PROGRESS NOTES
[facility] [place] [facility]  [address] [place] [address], [address]  Cesia Dominguez: [phone]  F: [phone]      Reason for Visit:   Irina Bernard is a 68 y.o. female who is seen for follow up of bladder cancer. Treatment History:   · Presented with gross hematuria 6/30/2016  · CT A/P 6/30/2016: Serpentine like filling defects present dependent aspect of urinary bladder. No evidence of metastatic disease  · Cystoscopy with Dr. Lashonda Barbosa 7/11/2016: possible tumor adjacent to bladder diverticulum on right wall of bladder  · Cystoscopy with Dr. Martha Ferguson 8/10/2016: Solid-appearing tumor on right bladder wall, about 1cm  · TURBT 8/31/2016: Invasive high-grade papillary urothelial carcinoma. There is at least superficial invasion of muscular propria  · CT Chest 9/20/2016: 4mm RLL noncalcified pulmonary nodule. Subcentimeter hypodensity right hepatic lobe too small to characterize. Left upper pole simple renal cyst 1.7cm. · Stage II (pT2 cN0 M0) Bladder Cancer  · Neoadjuvant chemotherapy with split-dose cisplatin and gemcitabine x2 cycles from 10/18/2016 to 11/15/2016. Stopped due to CVA  · Chemotherapy with Carboplatin and Gemcitabine x4 cycles completed around 5/2017 in [place]  · TURBT 11/20/2017 with Dr. Martha Ferguson: high-grade T1 bladder cancer  · Radiation with Dr. Mei Echevarria beginning 1/2/2018  · Concurrent chemotherapy with 5FU and mitomycin beginning 1/8/2018    History of Present Illness:   Here today for follow up. She was hospitalized in the beginning of January for falls, ams, lethargy. This was after about a week of radiation and chemotherapy. She then went to rehab, but from there was readmitted at [facility], where she was diagnosed with a \"brain bleed. \"  (We have requested records). She then went back to rehab and has been home for about 2 weeks. States she is feeling ok. She denies any nausea/vomiting, fevers/chills.   States she ambulates throughout her house with a walker. States she is living alone with daughter in law staying with her every morning/afternoon and her friend staying with her in the evenings and overnight. She states she has an appointment with Dr. Abby Griffin on Monday, but does not have a follow up with Dr. Rae Rollins. Her memory is poor, and she recalls very little of the recent medical events. She did not recall the names of her various physicians. She is accompanied by her daughter in law. PAST HISTORY: The following sections were reviewed and updated in the EMR as appropriate: PMH, SH, FH, Medications, Allergies. Allergies   Allergen Reactions    Erythromycin Hives    Flagyl [Metronidazole] Hives    Macrobid [Nitrofurantoin Monohyd/M-Cryst] Hives    Pcn [Penicillins] Hives    Sulfa (Sulfonamide Antibiotics) Hives      Review of Systems: A complete review of systems was obtained, reviewed, and scanned into the EMR. Pertinent findings reviewed above. Physical Exam:     Visit Vitals    /61 (BP 1 Location: Right arm, BP Patient Position: Sitting)    Pulse 62    Temp 97.8 °F (36.6 °C) (Oral)    Resp 16    Ht 5' 2.99\" (1.6 m)    Wt 158 lb (71.7 kg)    SpO2 100%    BMI 28 kg/m2     ECOG PS: 3-4  General: No distress  Eyes: PERRLA, anicteric sclerae  HENT: Atraumatic, OP clear  Neck: Supple  Lymphatic: No cervical, supraclavicular, or inguinal adenopathy  Respiratory: CTAB, normal respiratory effort  CV: Normal rate, regular rhythm, no murmurs, no peripheral edema  GI: Soft, nontender, nondistended, no masses, no hepatomegaly, no splenomegaly  MS: In wheelchair today. Digits without clubbing or cyanosis. Not able to get onto exam table. Skin: No rashes, ecchymoses, or petechiae. Normal temperature, turgor, and texture. Port in place.   Psych: Alert, oriented, appropriate affect, normal judgment/insight    Results:     Lab Results   Component Value Date/Time    WBC 3.8 01/08/2018 10:45 AM    HGB 9.5 (L) 01/08/2018 10:45 AM HCT 29.8 (L) 01/08/2018 10:45 AM    PLATELET 932 14/36/3547 10:45 AM    MCV 82.5 01/08/2018 10:45 AM    ABS. NEUTROPHILS 3.1 01/08/2018 10:45 AM    Hemoglobin (POC) 10.5 (L) 11/08/2016 09:48 AM    Hematocrit (POC) 31 (L) 11/08/2016 09:48 AM     Lab Results   Component Value Date/Time    Sodium 147 (H) 01/08/2018 10:45 AM    Potassium 3.3 (L) 01/08/2018 10:45 AM    Chloride 108 01/08/2018 10:45 AM    CO2 29 01/08/2018 10:45 AM    Glucose 136 (H) 01/08/2018 10:45 AM    BUN 12 01/08/2018 10:45 AM    Creatinine 0.88 01/08/2018 10:45 AM    GFR est AA >60 01/08/2018 10:45 AM    GFR est non-AA >60 01/08/2018 10:45 AM    Calcium 9.0 01/08/2018 10:45 AM    Sodium (POC) 143 11/08/2016 09:48 AM    Potassium (POC) 3.6 11/08/2016 09:48 AM    Chloride (POC) 104 11/08/2016 09:48 AM    Glucose (POC) 84 11/16/2016 11:13 AM    BUN (POC) 7 (L) 11/08/2016 09:48 AM    Creatinine (POC) 0.8 11/08/2016 09:48 AM    Calcium, ionized (POC) 1.18 11/08/2016 09:48 AM     Lab Results   Component Value Date/Time    Bilirubin, total 0.5 01/08/2018 10:45 AM    ALT (SGPT) 15 01/08/2018 10:45 AM    AST (SGOT) 24 01/08/2018 10:45 AM    Alk. phosphatase 83 01/08/2018 10:45 AM    Protein, total 6.8 01/08/2018 10:45 AM    Albumin 3.4 (L) 01/08/2018 10:45 AM    Globulin 3.4 01/08/2018 10:45 AM       CT C/A/P 5/30/2017: Decreased size of right posterolateral bladder nodule. Right posterolateral bladder wall thickening adjacent to nodule. Stable tiny pulmonary nodules. CT C/A/P 10/30/2017:   1. Superior right posteriolateral bladder wall mass measuring 1.2 x 1.4 x 1.6 cm suspicious for bladder cancer. 2. 2.5 x 2.5 cm calcification containing soft tissue mass adjacent to left internal iliac artery concerning for metastatic elli disease, unchanged since 6/30/2016. 3. Probable thyroid goiter. Correlation with any prior outside imaging or with thyroid ultrasound is recommended.   4. Heterogeneous portal venous phase enhancement of the posterior aspect of the right lobe of the liver not typical for metastatic disease but of uncertain etiology. Correlate with clinical and laboratory data and consider further evaluation with dedicated hepatic MRI preferred over CT. 5. Additional incidental findings including coronary artery disease and nonobstructed small bowel loop within midline abdominal wall hernia. Assessment:   1) Bladder Cancer  Initially diagnosed with Stage II bladder cancer, received neoadjuvant chemotherapy which was complicated by a CVA. Surgery was therefore not performed. After a several month break she received chemotherapy with Carboplatin and Gemcitabine for 4 cycles in [place]. Plan in [place] had been to proceed next with radiation +/- chemotherapy, but again she was lost to follow up until reestablishing care with me recently. Repeat cystoscopy with Dr. Gracy Dooley confirmed residual bladder tumor, and she has undergone repeat TURBT. Most recent CT scan confirms this finding, along with a stable but suspicious pelvic lymph node and a questionable liver abnormality. She was started on definitive chemoradiation with 5FU and mitomycin, receiving radiation with Dr. Arianna Fuentes. However, this was stopped after one week due to hospitalizations with various complications, including an apparent CNS hemorrhage. Her current performance status is poor. She is not a candidate for additional chemotherapy. She very clearly is not a candidate for definitive resection. I'm uncertain whether she would be a candidate for palliative XRT. I have asked her to follow up with Dr. Arianna Fuentes, and I will reach out to him to discuss. She may be a candidate for hospice care, which we discussed today. She is rather reluctant to go this route just yet. We did discuss the option of palliative care referral, and she is willing to see them, though I think her understanding of the entire situation is poor.     2) Indeterminate liver lesion  Unable to get MRI Abd due to pacemaker. Consider triphasic imaging with next scans. 2) DVT, bilateral  Occurred in [place]. Off anticoagulation now. For a malignancy associated thrombus, she may benefit from long-term anticoagulation. Additionally, with her Afib she has an indication for long-term therapy. However, with recent hospitalization for CNS hemorrhage, no anticoagulation at this time. 3) Bradycardia, Afib  S/P pacemaker placement. Previously on apxiaban, then rivaroxaban. Now off anticoagulation. Follow up with cardiology. 4) DM  PCP managing. 5) Port care  Port flushes    6) CNS hemorrhage  By patient/family report. Request records from [facility].     Plan:     Request records from [facility]  Follow up with Dr. Dano Coates  Follow up with Dr. Malcom Lee  Referral to palliative care  Will see back in 3 weeks for supportive care        Signed By: Erika Do MD

## 2018-03-05 ENCOUNTER — TELEPHONE (OUTPATIENT)
Dept: ONCOLOGY | Age: 78
End: 2018-03-05

## 2018-03-05 NOTE — TELEPHONE ENCOUNTER
[facility] [employer] at [facility]  [phone]    03/05/18- Reviewed what was discussed at patient's office visit with her daughter, Molly. She had further questions about prognosis. Offered to have Dr. Tilman Gammon call her back later to discuss this. She also had questions about transportation for her mother, call transferred to Israel Galvan to discuss further.

## 2018-03-05 NOTE — TELEPHONE ENCOUNTER
[facility]  Social Work Navigator Encounter     3/05/18 - Spoke to patient's daughter, Devin Galeana and provided her with a transportation resource, [employer] #[phone]. This resource was previously give to patient and her daughter and they have not had a chance to connected with them yet due to patient's hospitalizations.

## 2018-03-05 NOTE — TELEPHONE ENCOUNTER
Patients daughter called and stated that she would like to talk to a nurse regarding what is going on with her mother, and the steps going forward.  # [phone]

## 2018-03-07 ENCOUNTER — TELEPHONE (OUTPATIENT)
Dept: ONCOLOGY | Age: 78
End: 2018-03-07

## 2018-03-07 NOTE — TELEPHONE ENCOUNTER
Pts daughter called and left a voicemail requesting a return call from Dr. Stormy Manjarrez.  Call back number for Esteves Cousins is [phone]

## 2018-03-22 ENCOUNTER — OFFICE VISIT (OUTPATIENT)
Dept: ONCOLOGY | Age: 78
End: 2018-03-22

## 2018-03-22 ENCOUNTER — HOSPITAL ENCOUNTER (OUTPATIENT)
Dept: INFUSION THERAPY | Age: 78
Discharge: HOME | End: 2018-03-22
Payer: MEDICARE

## 2018-03-22 VITALS
OXYGEN SATURATION: 98 % | TEMPERATURE: 97.1 F | RESPIRATION RATE: 20 BRPM | DIASTOLIC BLOOD PRESSURE: 71 MMHG | HEART RATE: 60 BPM | SYSTOLIC BLOOD PRESSURE: 175 MMHG

## 2018-03-22 VITALS
TEMPERATURE: 98.5 F | RESPIRATION RATE: 18 BRPM | SYSTOLIC BLOOD PRESSURE: 160 MMHG | HEART RATE: 60 BPM | DIASTOLIC BLOOD PRESSURE: 69 MMHG

## 2018-03-22 DIAGNOSIS — I49.5 SICK SINUS SYNDROME (HCC): ICD-10-CM

## 2018-03-22 DIAGNOSIS — E11.8 TYPE 2 DIABETES MELLITUS WITH COMPLICATION, UNSPECIFIED LONG TERM INSULIN USE STATUS: ICD-10-CM

## 2018-03-22 DIAGNOSIS — I48.20 CHRONIC ATRIAL FIBRILLATION (HCC): ICD-10-CM

## 2018-03-22 DIAGNOSIS — K76.9 LIVER LESION: ICD-10-CM

## 2018-03-22 DIAGNOSIS — I82.4Y3 ACUTE DEEP VEIN THROMBOSIS (DVT) OF PROXIMAL VEIN OF BOTH LOWER EXTREMITIES (HCC): ICD-10-CM

## 2018-03-22 DIAGNOSIS — C67.9 MALIGNANT NEOPLASM OF URINARY BLADDER, UNSPECIFIED SITE (HCC): Primary | ICD-10-CM

## 2018-03-22 DIAGNOSIS — I63.9 ACUTE CVA (CEREBROVASCULAR ACCIDENT) (HCC): ICD-10-CM

## 2018-03-22 LAB
ALBUMIN SERPL-MCNC: 3 G/DL (ref 3.5–5)
ALBUMIN/GLOB SERPL: 0.7 {RATIO} (ref 1.1–2.2)
ALP SERPL-CCNC: 84 U/L (ref 45–117)
ALT SERPL-CCNC: 20 U/L (ref 12–78)
ANION GAP SERPL CALC-SCNC: 7 MMOL/L (ref 5–15)
AST SERPL-CCNC: 27 U/L (ref 15–37)
BASOPHILS # BLD: 0 K/UL (ref 0–0.1)
BASOPHILS NFR BLD: 0 % (ref 0–1)
BILIRUB SERPL-MCNC: 0.4 MG/DL (ref 0.2–1)
BUN SERPL-MCNC: 12 MG/DL (ref 6–20)
BUN/CREAT SERPL: 18 (ref 12–20)
CALCIUM SERPL-MCNC: 9 MG/DL (ref 8.5–10.1)
CHLORIDE SERPL-SCNC: 106 MMOL/L (ref 97–108)
CO2 SERPL-SCNC: 30 MMOL/L (ref 21–32)
CREAT SERPL-MCNC: 0.66 MG/DL (ref 0.55–1.02)
DIFFERENTIAL METHOD BLD: ABNORMAL
EOSINOPHIL # BLD: 0 K/UL (ref 0–0.4)
EOSINOPHIL NFR BLD: 1 % (ref 0–7)
ERYTHROCYTE [DISTWIDTH] IN BLOOD BY AUTOMATED COUNT: 19.2 % (ref 11.5–14.5)
GLOBULIN SER CALC-MCNC: 4.6 G/DL (ref 2–4)
GLUCOSE SERPL-MCNC: 97 MG/DL (ref 65–100)
HCT VFR BLD AUTO: 29.7 % (ref 35–47)
HGB BLD-MCNC: 9 G/DL (ref 11.5–16)
IMM GRANULOCYTES # BLD: 0 K/UL (ref 0–0.04)
IMM GRANULOCYTES NFR BLD AUTO: 1 % (ref 0–0.5)
LYMPHOCYTES # BLD: 0.5 K/UL (ref 0.8–3.5)
LYMPHOCYTES NFR BLD: 14 % (ref 12–49)
MCH RBC QN AUTO: 25.9 PG (ref 26–34)
MCHC RBC AUTO-ENTMCNC: 30.3 G/DL (ref 30–36.5)
MCV RBC AUTO: 85.6 FL (ref 80–99)
MONOCYTES # BLD: 0.5 K/UL (ref 0–1)
MONOCYTES NFR BLD: 13 % (ref 5–13)
NEUTS SEG # BLD: 2.5 K/UL (ref 1.8–8)
NEUTS SEG NFR BLD: 71 % (ref 32–75)
NRBC # BLD: 0 K/UL (ref 0–0.01)
NRBC BLD-RTO: 0 PER 100 WBC
PLATELET # BLD AUTO: 155 K/UL (ref 150–400)
POTASSIUM SERPL-SCNC: 3.5 MMOL/L (ref 3.5–5.1)
PROT SERPL-MCNC: 7.6 G/DL (ref 6.4–8.2)
RBC # BLD AUTO: 3.47 M/UL (ref 3.8–5.2)
RBC MORPH BLD: ABNORMAL
SODIUM SERPL-SCNC: 143 MMOL/L (ref 136–145)
WBC # BLD AUTO: 3.5 K/UL (ref 3.6–11)

## 2018-03-22 PROCEDURE — 77030012965 HC NDL HUBR BBMI -A

## 2018-03-22 PROCEDURE — 36591 DRAW BLOOD OFF VENOUS DEVICE: CPT

## 2018-03-22 PROCEDURE — 36415 COLL VENOUS BLD VENIPUNCTURE: CPT | Performed by: INTERNAL MEDICINE

## 2018-03-22 PROCEDURE — 74011250636 HC RX REV CODE- 250/636: Performed by: INTERNAL MEDICINE

## 2018-03-22 PROCEDURE — 85025 COMPLETE CBC W/AUTO DIFF WBC: CPT | Performed by: INTERNAL MEDICINE

## 2018-03-22 PROCEDURE — 80053 COMPREHEN METABOLIC PANEL: CPT | Performed by: INTERNAL MEDICINE

## 2018-03-22 RX ORDER — SODIUM CHLORIDE 0.9 % (FLUSH) 0.9 %
10 SYRINGE (ML) INJECTION AS NEEDED
Status: ACTIVE | OUTPATIENT
Start: 2018-03-22 | End: 2018-03-23

## 2018-03-22 RX ORDER — PRAVASTATIN SODIUM 80 MG/1
80 TABLET ORAL
COMMUNITY

## 2018-03-22 RX ORDER — METHIMAZOLE 5 MG/1
5 TABLET ORAL DAILY
COMMUNITY
End: 2020-08-11 | Stop reason: SDUPTHER

## 2018-03-22 RX ORDER — HEPARIN 100 UNIT/ML
500 SYRINGE INTRAVENOUS AS NEEDED
Status: ACTIVE | OUTPATIENT
Start: 2018-03-22 | End: 2018-03-23

## 2018-03-22 RX ADMIN — Medication 10 ML: at 10:53

## 2018-03-22 RX ADMIN — Medication 500 UNITS: at 10:54

## 2018-03-22 RX ADMIN — Medication 10 ML: at 10:54

## 2018-03-22 NOTE — PROGRESS NOTES
[facility] [address], [address] [facility] [address] [facility]  Dahiana Osbornv: [phone]  F: [phone]      Reason for Visit:   Yana Hsusein is a 68 y.o. female who is seen for follow up of bladder cancer. Treatment History:   · Presented with gross hematuria 6/30/2016  · CT A/P 6/30/2016: Serpentine like filling defects present dependent aspect of urinary bladder. No evidence of metastatic disease  · Cystoscopy with Dr. Saul Gaticaocent 7/11/2016: possible tumor adjacent to bladder diverticulum on right wall of bladder  · Cystoscopy with Dr. Isabel Pandya 8/10/2016: Solid-appearing tumor on right bladder wall, about 1cm  · TURBT 8/31/2016: Invasive high-grade papillary urothelial carcinoma. There is at least superficial invasion of muscular propria  · CT Chest 9/20/2016: 4mm RLL noncalcified pulmonary nodule. Subcentimeter hypodensity right hepatic lobe too small to characterize. Left upper pole simple renal cyst 1.7cm. · Stage II (pT2 cN0 M0) Bladder Cancer  · Neoadjuvant chemotherapy with split-dose cisplatin and gemcitabine x2 cycles from 10/18/2016 to 11/15/2016. Stopped due to CVA  · Chemotherapy with Carboplatin and Gemcitabine x4 cycles completed around 5/2017 in [place]  · TURBT 11/20/2017 with Dr. Isabel Pandya: high-grade T1 bladder cancer  · Radiation with Dr. Rosangela Rosales 1/2/2018 - 1/12/2018, stopped due to hospitalization  · Concurrent chemotherapy with 5FU and mitomycin 1/8/2018 -1/13/2018 for 1 cycle, stopped due to hospitalization    History of Present Illness:   Here today for follow up. She states she is doing pretty good. Finished PT/OT. She states her daughter-in-law is staying with her during the day and then a friend comes and stays overnight. She states this seems to be working well. Friend or family cooking meals and helping with ADLs. She states she is getting up and about at home, in wheelchair here.  She brought some of her medications with her but is unsure of what else she is taking. She is accompanied by her daughter in law. PAST HISTORY: The following sections were reviewed and updated in the EMR as appropriate: PMH, SH, FH, Medications, Allergies. Allergies   Allergen Reactions    Erythromycin Hives    Flagyl [Metronidazole] Hives    Macrobid [Nitrofurantoin Monohyd/M-Cryst] Hives    Pcn [Penicillins] Hives    Sulfa (Sulfonamide Antibiotics) Hives      Review of Systems: A complete review of systems was obtained, reviewed, and scanned into the EMR. Pertinent findings reviewed above. Physical Exam:     Visit Vitals    /71 (BP 1 Location: Left arm, BP Patient Position: Sitting)    Pulse 60    Temp 97.1 °F (36.2 °C) (Temporal)    Resp 20    SpO2 98%     ECOG PS: 2-3  General: No distress  Eyes: PERRLA, anicteric sclerae  HENT: Atraumatic, OP clear  Neck: Supple  Lymphatic: No cervical, supraclavicular, or inguinal adenopathy  Respiratory: CTAB, normal respiratory effort  CV: Normal rate, regular rhythm, no murmurs, no peripheral edema  GI: Soft, nontender, nondistended, no masses, no hepatomegaly, no splenomegaly  MS: In wheelchair today. Digits without clubbing or cyanosis. Not able to get onto exam table. Skin: No rashes, ecchymoses, or petechiae. Normal temperature, turgor, and texture. Port in place. Psych: Alert, oriented, appropriate affect, normal judgment/insight    Results:     Lab Results   Component Value Date/Time    WBC 3.8 01/08/2018 10:45 AM    HGB 9.5 (L) 01/08/2018 10:45 AM    HCT 29.8 (L) 01/08/2018 10:45 AM    PLATELET 731 56/09/9655 10:45 AM    MCV 82.5 01/08/2018 10:45 AM    ABS.  NEUTROPHILS 3.1 01/08/2018 10:45 AM    Hemoglobin (POC) 10.5 (L) 11/08/2016 09:48 AM    Hematocrit (POC) 31 (L) 11/08/2016 09:48 AM     Lab Results   Component Value Date/Time    Sodium 147 (H) 01/08/2018 10:45 AM    Potassium 3.3 (L) 01/08/2018 10:45 AM    Chloride 108 01/08/2018 10:45 AM    CO2 29 01/08/2018 10:45 AM    Glucose 136 (H) 01/08/2018 10:45 AM    BUN 12 01/08/2018 10:45 AM    Creatinine 0.88 01/08/2018 10:45 AM    GFR est AA >60 01/08/2018 10:45 AM    GFR est non-AA >60 01/08/2018 10:45 AM    Calcium 9.0 01/08/2018 10:45 AM    Sodium (POC) 143 11/08/2016 09:48 AM    Potassium (POC) 3.6 11/08/2016 09:48 AM    Chloride (POC) 104 11/08/2016 09:48 AM    Glucose (POC) 84 11/16/2016 11:13 AM    BUN (POC) 7 (L) 11/08/2016 09:48 AM    Creatinine (POC) 0.8 11/08/2016 09:48 AM    Calcium, ionized (POC) 1.18 11/08/2016 09:48 AM     Lab Results   Component Value Date/Time    Bilirubin, total 0.5 01/08/2018 10:45 AM    ALT (SGPT) 15 01/08/2018 10:45 AM    AST (SGOT) 24 01/08/2018 10:45 AM    Alk. phosphatase 83 01/08/2018 10:45 AM    Protein, total 6.8 01/08/2018 10:45 AM    Albumin 3.4 (L) 01/08/2018 10:45 AM    Globulin 3.4 01/08/2018 10:45 AM       CT C/A/P 5/30/2017: Decreased size of right posterolateral bladder nodule. Right posterolateral bladder wall thickening adjacent to nodule. Stable tiny pulmonary nodules. CT C/A/P 10/30/2017:   1. Superior right posteriolateral bladder wall mass measuring 1.2 x 1.4 x 1.6 cm suspicious for bladder cancer. 2. 2.5 x 2.5 cm calcification containing soft tissue mass adjacent to left internal iliac artery concerning for metastatic elli disease, unchanged since 6/30/2016. 3. Probable thyroid goiter. Correlation with any prior outside imaging or with thyroid ultrasound is recommended. 4. Heterogeneous portal venous phase enhancement of the posterior aspect of the right lobe of the liver not typical for metastatic disease but of uncertain etiology. Correlate with clinical and laboratory data and consider further evaluation with dedicated hepatic MRI preferred over CT. 5. Additional incidental findings including coronary artery disease and nonobstructed small bowel loop within midline abdominal wall hernia.     Assessment:   1) Bladder Cancer  Initially diagnosed with Stage II bladder cancer, received neoadjuvant chemotherapy which was complicated by a CVA. Surgery was therefore not performed. After a several month break she received chemotherapy with Carboplatin and Gemcitabine for 4 cycles in [place]. Plan in [place] had been to proceed next with radiation +/- chemotherapy, but again she was lost to follow up until reestablishing care with me recently. Repeat cystoscopy with Dr. Jhonatan Astudillo confirmed residual bladder tumor, and she has undergone repeat TURBT. Last CT scan confirms this finding, along with a stable but suspicious pelvic lymph node and a questionable liver abnormality. She was started on definitive chemoradiation with 5FU and mitomycin, receiving radiation with Dr. Wilford Gilford. However, this was stopped after one week due to hospitalizations with various complications, including a subdural hematoma. She is doing better than last visit. She has been seen by urology with cystoscopy that reportedly didn't look too bad. She has also been seen by Dr. Wilford Gilford who has elected to hold off on any additional palliative radiation in the absence of symptoms. We discussed today that she is not a good candidate for additional chemotherapy at present. We will transition to surveillance for now and can consider immune therapy in the future when progressing. We did review that her cancer is no longer felt to be curable, and management is now with palliative intent. We will plan to get CT C/A/P to establish new baseline and see her back in about a month with port flush. 2) Indeterminate liver lesion  Unable to get MRI Abd due to pacemaker. Triphasic imaging with scans prior to next visit. 2) DVT, bilateral  Occurred in [place]. Off anticoagulation now due to brain bleed. For a malignancy associated thrombus, she may benefit from long-term anticoagulation. Additionally, with her Afib she has an indication for long-term therapy.   However, with recent hospitalization for CNS hemorrhage, no anticoagulation at this time. 3) Bradycardia, Afib  S/P pacemaker placement. Previously on apxiaban, then rivaroxaban. Now off anticoagulation. Follow up with cardiology. 4) DM  PCP managing. Patient unsure if she is taking anything for this at present. Advised patient to bring all medications to her next appt for med rec. 5) Port care  Port flushes every 4-6 weeks. 6) CNS hemorrhage  Managed conservatively at [address] [place] [address]. Plan:     CT C/A/P with triphase abd prior to next visit  Port flush every 4-6 weeks   Follow up with Dr. Mei Echevarria as planned  Follow up with Dr. Martha Ferguson as planned  Referral to palliative care pending  Return to clinic in about a month with port flush    Patient was seen in conjunction with Marcia Sesay NP.       Signed By: Lalito Serrano MD

## 2018-03-22 NOTE — PROGRESS NOTES
[facility] VISIT NOTE    9409  Pt arrived at [place] in a [facility] and in no distress for port flush. Blood pressure 160/69, pulse 60, temperature 98.5 °F (36.9 °C), resp. rate 18. Right chest port accessed with 0.75 in gamboa no difficulty. Positive blood return noted and labs drawn. Port de-accessed and flushed per protocol. 1055  D/C'd from [place] ambulatory and in no distress accompanied by her family. Next appointment is 4/26/18 at 1300.

## 2018-03-22 NOTE — MR AVS SNAPSHOT
[address] 
 
 
 [address] [facility], [address] 1007 [facility] 
[phone] Patient: See Ruiz MRN: [id number] MCF:[id number] Visit Information Date & Time Provider Department Dept. Phone Encounter #  
 3/22/2018  9:45 AM Keila Lewis NP [address] at [address] [facility] [address] [id number] Follow-up Instructions Return in 5 weeks (on 4/26/2018) for luis Engle. Your Appointments 4/26/2018  1:45 PM  
ESTABLISHED PATIENT with Keila Lewis NP  
[facility] at [address] [place] [address] Dr [address]) Appt Note: Jhonny/Claire, luis flush 301 [facility], [address] [id number] [phone]  
  
   
 301 [facility], [address] 1007 [facility] Upcoming Health Maintenance Date Due  
 FOOT EXAM Q1 6/26/1950 MICROALBUMIN Q1 6/26/1950 COLONOSCOPY 6/26/1958 DTaP/Tdap/Td series (1 - Tdap) 6/26/1961 ZOSTER VACCINE AGE 60> 4/26/2000 Bone Densitometry (Dexa) Screening 6/26/2005 Pneumococcal 65+ High/Highest Risk (1 of 2 - PCV13) 6/26/2005 EYE EXAM RETINAL OR DILATED Q1 11/30/2015 GLAUCOMA SCREENING Q2Y 11/30/2016 LIPID PANEL Q1 1/18/2017 HEMOGLOBIN A1C Q6M 5/15/2017 Influenza Age 5 to Adult 8/1/2017 MEDICARE YEARLY EXAM 3/14/2018 Allergies as of 3/22/2018  Review Complete On: 3/22/2018 By: Keila Lewis NP Severity Noted Reaction Type Reactions Erythromycin  11/28/2012    Hives Flagyl [Metronidazole]  11/28/2012    Hives Macrobid [Nitrofurantoin Monohyd/m-cryst]  10/10/2016    Hives Pcn [Penicillins]  09/09/2016    Hives Sulfa (Sulfonamide Antibiotics)  11/28/2012    Hives Current Immunizations  Reviewed on 1/8/2018 Name Date Influenza Vaccine 10/18/2015 Not reviewed this visit You Were Diagnosed With   
  
 Codes Comments Malignant neoplasm of urinary bladder, unspecified site [facility])    -  Primary ICD-10-CM: C67.9 ICD-9-CM: 188. 9 Liver lesion     ICD-10-CM: K76.9 ICD-9-CM: 573.8 Chronic atrial fibrillation (HCC)     ICD-10-CM: I98.8 ICD-9-CM: 427.31 Type 2 diabetes mellitus with complication, unspecified long term insulin use status (HCC)     ICD-10-CM: E11.8 ICD-9-CM: 250.90 Sick sinus syndrome (HCC)     ICD-10-CM: I49.5 ICD-9-CM: 427.81 Acute CVA (cerebrovascular accident) ([facility]     ICD-10-CM: I63.9 ICD-9-CM: 434.91 Acute deep vein thrombosis (DVT) of proximal vein of both lower extremities (HCC)     ICD-10-CM: I82.4Y3 ICD-9-CM: 453.41 Vitals BP Pulse Temp Resp SpO2 OB Status 175/71 (BP 1 Location: Left arm, BP Patient Position: Sitting) 60 97.1 °F (36.2 °C) (Temporal) 20 98% Hysterectomy Smoking Status Never Smoker Preferred Pharmacy Pharmacy Name Phone [id number] [address] [place] [address] [phone] Your Updated Medication List  
  
   
This list is accurate as of 3/22/18 10:32 AM.  Always use your most recent med list.  
  
  
  
  
 acetaminophen 650 mg Tber Commonly known as:  TYLENOL Take 650 mg by mouth every eight (8) hours. amiodarone 200 mg tablet Commonly known as:  CORDARONE Take 200 mg by mouth daily. docusate sodium 100 mg capsule Commonly known as:  COLACE  
TAKE 1 CAPSULE BY MOUTH TWICE A DAY  
  
 lidocaine-prilocaine topical cream  
Commonly known as:  EMLA Apply to port 30-60 minutes prior to accessing, cover with plastic wrap.  
  
 losartan 50 mg tablet Commonly known as:  COZAAR Take 50 mg by mouth two (2) times a day. methIMAzole 5 mg tablet Commonly known as:  TAPAZOLE Take 5 mg by mouth daily. omeprazole 20 mg capsule Commonly known as:  PRILOSEC  
TAKE ONE CAPSULE EVERY DAY  
  
 pantoprazole 40 mg tablet Commonly known as:  PROTONIX Take 40 mg by mouth daily. PHOSPHA 250 NEUTRAL 250 mg tablet Generic drug:  phosphorus Take 1 Tab by mouth two (2) times a day. pravastatin 40 mg tablet Commonly known as:  PRAVACHOL Take 80 mg by mouth daily. VITAMIN D3 1,000 unit Cap Generic drug:  cholecalciferol Take  by mouth daily. Follow-up Instructions Return in 5 weeks (on 4/26/2018) for luis Engle. To-Do List   
 04/18/2018 Imaging:  CT ABD W WO CONT   
  
 04/18/2018 Imaging:  CT CHEST W CONT Around 04/18/2018 Imaging:  CT PELV W CONT   
  
 04/26/2018 1:00 PM  
  Appointment with Oma Norton at [place] [address] ([phone]) [facility] SERVICES! Dear Jhonny Carias: Thank you for requesting a [employer] account. Our records indicate that you already have an active [employer] account. You can access your account anytime at [email or web address] Did you know that you can access your hospital and ER discharge instructions at any time in [employer]? You can also review all of your test results from your hospital stay or ER visit. Additional Information If you have questions, please visit the Frequently Asked Questions section of the [employer] website at [email or web address] Remember, [employer] is NOT to be used for urgent needs. For medical emergencies, dial 911. Now available from your iPhone and Android! Please provide this summary of care documentation to your next provider. Your primary care clinician is listed as Josep November. If you have any questions after today's visit, please call [phone].

## 2018-03-23 ENCOUNTER — NURSE NAVIGATOR (OUTPATIENT)
Dept: PALLATIVE CARE | Age: 78
End: 2018-03-23

## 2018-03-29 ENCOUNTER — TELEPHONE (OUTPATIENT)
Dept: ONCOLOGY | Age: 78
End: 2018-03-29

## 2018-03-29 NOTE — TELEPHONE ENCOUNTER
Pts daughter called and left a voicemail requesting a return call from the nurse or Fabian Benotn.  Call back number for Zhane Adrian is [phone]

## 2018-03-29 NOTE — TELEPHONE ENCOUNTER
[address] [facility]   Social Work Navigator Encounter       Returned called to Tavia daughter, Nina Kussmaul. She shared with me pt move in with her back to [place] last week. Pt's oncologist in [place] is Dr. Dung Szymanski (phone # [phone]/ fax # [phone]). She asked for medical records to be sent to Dr. Chyna Jovel.    Provided her with instructions on how to access [employer]. She tells me pt is doing well and not having any symptoms.  She expressed appreciation.    Samara Magallon

## 2018-03-30 NOTE — TELEPHONE ENCOUNTER
[address] at [facility]  [phone]    03/30/18- Records faxed to Dr. Chris Iglesias (phone # [phone]/ fax # [phone]. Fax confirmation delivery successful.

## 2018-04-26 ENCOUNTER — HOSPITAL ENCOUNTER (OUTPATIENT)
Dept: INFUSION THERAPY | Age: 78
Discharge: HOME | End: 2018-04-26

## 2018-04-26 NOTE — PROGRESS NOTES
[facility] VISIT NOTE    Pt arrived at [facility] ambulatory and in no distress for Port flush. Assessment completed, pt c/o .     chest port accessed with  in gamboa no difficulty. Positive blood return noted and labs drawn. Tolerated treatment well, no adverse reaction noted. Port de-accessed and flushed per protocol. Positive blood return noted. D/C'd from [facility] ambulatory and in no distress accompanied by . Next appointment is .

## 2018-07-19 ENCOUNTER — HOSPITAL ENCOUNTER (OUTPATIENT)
Dept: INFUSION THERAPY | Age: 78
Discharge: HOME | End: 2018-07-19

## 2018-07-19 RX ORDER — SODIUM CHLORIDE 0.9 % (FLUSH) 0.9 %
5-10 SYRINGE (ML) INJECTION AS NEEDED
Status: CANCELLED | OUTPATIENT
Start: 2018-07-19 | End: 2018-07-20

## 2018-07-19 RX ORDER — HEPARIN 100 UNIT/ML
500 SYRINGE INTRAVENOUS AS NEEDED
Status: CANCELLED | OUTPATIENT
Start: 2018-07-19 | End: 2018-07-20

## 2018-08-16 ENCOUNTER — APPOINTMENT (OUTPATIENT)
Dept: INFUSION THERAPY | Age: 78
End: 2018-08-16

## 2018-09-13 ENCOUNTER — APPOINTMENT (OUTPATIENT)
Dept: INFUSION THERAPY | Age: 78
End: 2018-09-13

## 2018-10-16 ENCOUNTER — OFFICE VISIT (OUTPATIENT)
Dept: ONCOLOGY | Age: 78
End: 2018-10-16

## 2018-10-16 VITALS
BODY MASS INDEX: 26.26 KG/M2 | OXYGEN SATURATION: 100 % | HEART RATE: 61 BPM | WEIGHT: 148.2 LBS | RESPIRATION RATE: 18 BRPM | DIASTOLIC BLOOD PRESSURE: 74 MMHG | HEIGHT: 63 IN | SYSTOLIC BLOOD PRESSURE: 155 MMHG | TEMPERATURE: 96.8 F

## 2018-10-16 DIAGNOSIS — C67.9 MALIGNANT NEOPLASM OF URINARY BLADDER, UNSPECIFIED SITE (HCC): Primary | ICD-10-CM

## 2018-10-16 RX ORDER — OXYBUTYNIN CHLORIDE 5 MG/1
10 TABLET ORAL DAILY
COMMUNITY
End: 2020-08-11

## 2018-10-16 RX ORDER — AMLODIPINE BESYLATE 2.5 MG
TABLET ORAL DAILY
COMMUNITY
End: 2020-08-11

## 2018-10-16 RX ORDER — DICLOFENAC SODIUM 10 MG/G
GEL TOPICAL 4 TIMES DAILY
COMMUNITY
End: 2020-08-11

## 2018-10-16 NOTE — PROGRESS NOTES
[facility] [place] at [address], [address] [facility] 1007 [facility]  Lily Rankinert: [phone]  F: [phone]      Reason for Visit:   Della Monteiro is a 66 y.o. female who is seen for follow up of bladder cancer. Treatment History:   · Presented with gross hematuria 6/30/2016  · CT A/P 6/30/2016: Serpentine like filling defects present dependent aspect of urinary bladder. No evidence of metastatic disease  · Cystoscopy with Dr. Marietta Srinivasan 7/11/2016: possible tumor adjacent to bladder diverticulum on right wall of bladder  · Cystoscopy with Dr. Chemo Crockett 8/10/2016: Solid-appearing tumor on right bladder wall, about 1cm  · TURBT 8/31/2016: Invasive high-grade papillary urothelial carcinoma. There is at least superficial invasion of muscular propria  · CT Chest 9/20/2016: 4mm RLL noncalcified pulmonary nodule. Subcentimeter hypodensity right hepatic lobe too small to characterize. Left upper pole simple renal cyst 1.7cm. · Neoadjuvant chemotherapy with split-dose cisplatin and gemcitabine x2 cycles from 10/18/2016 to 11/15/2016. Stopped due to CVA  · Chemotherapy with Carboplatin and Gemcitabine x4 cycles completed around 5/2017 in [place]  · TURBT 11/20/2017 with Dr. Chemo Crockett: high-grade T1 bladder cancer  · Radiation with Dr. Cyndi Sharif 1/2/2018 - 1/12/2018, stopped due to hospitalization  · Concurrent chemotherapy with 5FU and mitomycin 1/8/2018 -1/13/2018 for 1 cycle, stopped due to hospitalization  · Palliative immunotherapy with durvalumab given in [place], uncertain dates    History of Present Illness:   She returns for follow up. After I saw her last, she moved up to [address] [place] [address] to be near her daughter. She re-established care with oncology there, where she was started on immunotherapy. Per their notes, she was hospitalized several times with various complications, but overall was doing fairly well on therapy.   We recently received a call from a local nursing home to arrange a follow up appointment with her here. She was dropped off by her transportation, with no information provided. She apparently has relocated back to this area and is living in a nursing home. However, the patient has significant dementia and can provide very little history. She does not know where she lives, and does not recall ever coming to our office before (depsite a dozen or more prior visits). She believes she was last in [place] a week ago and received treatment at that time. She thinks her daughter \"dropped her off\" at a facility in [facility] recently, but doesn't know why. She asked repeatedly \"does the paperwork say I'm cancer free? \" despite me telling her that we have no paperwork. PAST HISTORY: The following sections were reviewed and updated in the EMR as appropriate: PMH, SH, FH, Medications, Allergies. Allergies   Allergen Reactions    Erythromycin Hives    Flagyl [Metronidazole] Hives    Macrobid [Nitrofurantoin Monohyd/M-Cryst] Hives    Pcn [Penicillins] Hives    Sulfa (Sulfonamide Antibiotics) Hives      Review of Systems: A complete review of systems was obtained, reviewed, and scanned into the EMR. Pertinent findings reviewed above. Physical Exam:     Visit Vitals    /74 (BP 1 Location: Right arm, BP Patient Position: Sitting)    Pulse 61    Temp 96.8 °F (36 °C) (Oral)    Resp 18    Ht 5' 2.99\" (1.6 m)    Wt 148 lb 3.2 oz (67.2 kg)    SpO2 100%    BMI 26.26 kg/m2     ECOG PS: 2-3  General: No distress  Eyes: PERRLA, anicteric sclerae  HENT: Atraumatic, OP clear  Neck: Supple  Lymphatic: No cervical, supraclavicular, or inguinal adenopathy  Respiratory: CTAB, normal respiratory effort  CV: Normal rate, regular rhythm, no murmurs, no peripheral edema  GI: Soft, nontender, nondistended, no masses, no hepatomegaly, no splenomegaly  MS: In wheelchair today. Digits without clubbing or cyanosis. Not able to get onto exam table.   Skin: No rashes, ecchymoses, or petechiae. Normal temperature, turgor, and texture. Port in place. Psych: Alert, oriented, appropriate affect, normal judgment/insight    Results:     Lab Results   Component Value Date/Time    WBC 3.5 (L) 03/22/2018 10:51 AM    HGB 9.0 (L) 03/22/2018 10:51 AM    HCT 29.7 (L) 03/22/2018 10:51 AM    PLATELET 702 04/99/8274 10:51 AM    MCV 85.6 03/22/2018 10:51 AM    ABS. NEUTROPHILS 2.5 03/22/2018 10:51 AM    Hemoglobin (POC) 10.5 (L) 11/08/2016 09:48 AM    Hematocrit (POC) 31 (L) 11/08/2016 09:48 AM     Lab Results   Component Value Date/Time    Sodium 143 03/22/2018 10:51 AM    Potassium 3.5 03/22/2018 10:51 AM    Chloride 106 03/22/2018 10:51 AM    CO2 30 03/22/2018 10:51 AM    Glucose 97 03/22/2018 10:51 AM    BUN 12 03/22/2018 10:51 AM    Creatinine 0.66 03/22/2018 10:51 AM    GFR est AA >60 03/22/2018 10:51 AM    GFR est non-AA >60 03/22/2018 10:51 AM    Calcium 9.0 03/22/2018 10:51 AM    Sodium (POC) 143 11/08/2016 09:48 AM    Potassium (POC) 3.6 11/08/2016 09:48 AM    Chloride (POC) 104 11/08/2016 09:48 AM    Glucose (POC) 84 11/16/2016 11:13 AM    BUN (POC) 7 (L) 11/08/2016 09:48 AM    Creatinine (POC) 0.8 11/08/2016 09:48 AM    Calcium, ionized (POC) 1.18 11/08/2016 09:48 AM     Lab Results   Component Value Date/Time    Bilirubin, total 0.4 03/22/2018 10:51 AM    ALT (SGPT) 20 03/22/2018 10:51 AM    AST (SGOT) 27 03/22/2018 10:51 AM    Alk. phosphatase 84 03/22/2018 10:51 AM    Protein, total 7.6 03/22/2018 10:51 AM    Albumin 3.0 (L) 03/22/2018 10:51 AM    Globulin 4.6 (H) 03/22/2018 10:51 AM       CT C/A/P 5/30/2017: Decreased size of right posterolateral bladder nodule. Right posterolateral bladder wall thickening adjacent to nodule. Stable tiny pulmonary nodules. CT C/A/P 10/30/2017:   1. Superior right posteriolateral bladder wall mass measuring 1.2 x 1.4 x 1.6 cm suspicious for bladder cancer.   2. 2.5 x 2.5 cm calcification containing soft tissue mass adjacent to left internal iliac artery concerning for metastatic elli disease, unchanged since 6/30/2016. 3. Probable thyroid goiter. Correlation with any prior outside imaging or with thyroid ultrasound is recommended. 4. Heterogeneous portal venous phase enhancement of the posterior aspect of the right lobe of the liver not typical for metastatic disease but of uncertain etiology. Correlate with clinical and laboratory data and consider further evaluation with dedicated hepatic MRI preferred over CT. 5. Additional incidental findings including coronary artery disease and nonobstructed small bowel loop within midline abdominal wall hernia. Assessment:   1) Bladder Cancer  Initially diagnosed with Stage II bladder cancer, received neoadjuvant chemotherapy which was complicated by a CVA. Surgery was therefore not performed. After a several month break she received chemotherapy with Carboplatin and Gemcitabine for 4 cycles in [place]. Plan in [place] had been to proceed next with radiation +/- chemotherapy, but again she was lost to follow up until reestablishing care here. Repeat cystoscopy with Dr. China Martinez confirmed residual bladder tumor, and she underwent repeat TURBT. This was followed by an attempt at chemoradiation, but she did not complete therapy as she was hospitalized with a subdural hematoma. She then relocated back to [place] where she received immunotherapy with durvalumab, uncertain dates of treatment. The patient is unable to provide a reliable history, and came alone to this appointment. We do not have much records from her oncologist in [place]. I called her daughter, and she does not know why the patient is following up in our office today. She reports the patient has moved back to [place] to an assisted living facility, and she has an appointment to see oncology at [facility] in the near future. She requests that we hold off on any further management at this time and defer to her new oncologist at [facility].       Plan: · Patient to be seen by [facility] oncology  · Return to see me as needed    Signed By: Melisa Sandhu MD

## 2018-10-17 ENCOUNTER — APPOINTMENT (OUTPATIENT)
Dept: GENERAL RADIOLOGY | Age: 78
DRG: 554 | End: 2018-10-17
Attending: EMERGENCY MEDICINE
Payer: MEDICARE

## 2018-10-17 ENCOUNTER — HOSPITAL ENCOUNTER (INPATIENT)
Age: 78
LOS: 6 days | Discharge: NURSING, REHAB OR LONG-TERM CARE | DRG: 554 | End: 2018-10-23
Attending: EMERGENCY MEDICINE | Admitting: INTERNAL MEDICINE
Payer: MEDICARE

## 2018-10-17 ENCOUNTER — APPOINTMENT (OUTPATIENT)
Dept: CT IMAGING | Age: 78
DRG: 554 | End: 2018-10-17
Attending: EMERGENCY MEDICINE
Payer: MEDICARE
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
## 2018-10-17 DIAGNOSIS — R26.9 ABNORMALITY OF GAIT: ICD-10-CM

## 2018-10-17 DIAGNOSIS — M25.552 LEFT HIP PAIN: Primary | ICD-10-CM

## 2018-10-17 PROBLEM — R26.2 INABILITY TO WALK: Status: ACTIVE | Noted: 2018-10-17

## 2018-10-17 LAB
ALBUMIN SERPL-MCNC: 2.9 G/DL (ref 3.5–5)
ALBUMIN/GLOB SERPL: 0.7 {RATIO} (ref 1.1–2.2)
ALP SERPL-CCNC: 82 U/L (ref 45–117)
ALT SERPL-CCNC: 32 U/L (ref 12–78)
ANION GAP SERPL CALC-SCNC: 7 MMOL/L (ref 5–15)
APPEARANCE UR: CLEAR
AST SERPL-CCNC: 43 U/L (ref 15–37)
BACTERIA URNS QL MICRO: NEGATIVE /HPF
BASOPHILS # BLD: 0 K/UL (ref 0–0.1)
BASOPHILS NFR BLD: 0 % (ref 0–1)
BILIRUB SERPL-MCNC: 0.8 MG/DL (ref 0.2–1)
BILIRUB UR QL: NEGATIVE
BUN SERPL-MCNC: 14 MG/DL (ref 6–20)
BUN/CREAT SERPL: 14 (ref 12–20)
CALCIUM SERPL-MCNC: 8.9 MG/DL (ref 8.5–10.1)
CHLORIDE SERPL-SCNC: 105 MMOL/L (ref 97–108)
CO2 SERPL-SCNC: 30 MMOL/L (ref 21–32)
COLOR UR: ABNORMAL
COMMENT, HOLDF: NORMAL
CREAT SERPL-MCNC: 1 MG/DL (ref 0.55–1.02)
DIFFERENTIAL METHOD BLD: ABNORMAL
EOSINOPHIL # BLD: 0 K/UL (ref 0–0.4)
EOSINOPHIL NFR BLD: 0 % (ref 0–7)
EPITH CASTS URNS QL MICRO: ABNORMAL /LPF
ERYTHROCYTE [DISTWIDTH] IN BLOOD BY AUTOMATED COUNT: 15.7 % (ref 11.5–14.5)
EST. AVERAGE GLUCOSE BLD GHB EST-MCNC: 114 MG/DL
GLOBULIN SER CALC-MCNC: 4 G/DL (ref 2–4)
GLUCOSE BLD STRIP.AUTO-MCNC: 74 MG/DL (ref 65–100)
GLUCOSE BLD STRIP.AUTO-MCNC: 89 MG/DL (ref 65–100)
GLUCOSE BLD STRIP.AUTO-MCNC: 92 MG/DL (ref 65–100)
GLUCOSE BLD STRIP.AUTO-MCNC: 94 MG/DL (ref 65–100)
GLUCOSE SERPL-MCNC: 92 MG/DL (ref 65–100)
GLUCOSE UR STRIP.AUTO-MCNC: NEGATIVE MG/DL
HBA1C MFR BLD: 5.6 % (ref 4.2–6.3)
HCT VFR BLD AUTO: 34.8 % (ref 35–47)
HGB BLD-MCNC: 10.8 G/DL (ref 11.5–16)
HGB UR QL STRIP: NEGATIVE
IMM GRANULOCYTES # BLD: 0 K/UL
IMM GRANULOCYTES NFR BLD AUTO: 0 %
KETONES UR QL STRIP.AUTO: NEGATIVE MG/DL
LEUKOCYTE ESTERASE UR QL STRIP.AUTO: ABNORMAL
LYMPHOCYTES # BLD: 0.3 K/UL (ref 0.8–3.5)
LYMPHOCYTES NFR BLD: 6 % (ref 12–49)
MCH RBC QN AUTO: 27.3 PG (ref 26–34)
MCHC RBC AUTO-ENTMCNC: 31 G/DL (ref 30–36.5)
MCV RBC AUTO: 88.1 FL (ref 80–99)
MONOCYTES # BLD: 0.2 K/UL (ref 0–1)
MONOCYTES NFR BLD: 4 % (ref 5–13)
NEUTS SEG # BLD: 5.1 K/UL (ref 1.8–8)
NEUTS SEG NFR BLD: 90 % (ref 32–75)
NITRITE UR QL STRIP.AUTO: POSITIVE
NRBC # BLD: 0 K/UL (ref 0–0.01)
NRBC BLD-RTO: 0 PER 100 WBC
PH UR STRIP: 6 [PH] (ref 5–8)
PLATELET # BLD AUTO: 150 K/UL (ref 150–400)
PMV BLD AUTO: 12.1 FL (ref 8.9–12.9)
POTASSIUM SERPL-SCNC: 3.5 MMOL/L (ref 3.5–5.1)
PROT SERPL-MCNC: 6.9 G/DL (ref 6.4–8.2)
PROT UR STRIP-MCNC: NEGATIVE MG/DL
RBC # BLD AUTO: 3.95 M/UL (ref 3.8–5.2)
RBC #/AREA URNS HPF: ABNORMAL /HPF (ref 0–5)
RBC MORPH BLD: ABNORMAL
RBC MORPH BLD: ABNORMAL
SAMPLES BEING HELD,HOLD: NORMAL
SERVICE CMNT-IMP: NORMAL
SODIUM SERPL-SCNC: 142 MMOL/L (ref 136–145)
SP GR UR REFRACTOMETRY: 1.01 (ref 1–1.03)
UA: UC IF INDICATED,UAUC: ABNORMAL
UROBILINOGEN UR QL STRIP.AUTO: 0.2 EU/DL (ref 0.2–1)
WBC # BLD AUTO: 5.6 K/UL (ref 3.6–11)
WBC URNS QL MICRO: ABNORMAL /HPF (ref 0–4)

## 2018-10-17 PROCEDURE — 83036 HEMOGLOBIN GLYCOSYLATED A1C: CPT | Performed by: INTERNAL MEDICINE

## 2018-10-17 PROCEDURE — 99218 HC RM OBSERVATION: CPT

## 2018-10-17 PROCEDURE — 73700 CT LOWER EXTREMITY W/O DYE: CPT

## 2018-10-17 PROCEDURE — 77030032490 HC SLV COMPR SCD KNE COVD -B

## 2018-10-17 PROCEDURE — 36415 COLL VENOUS BLD VENIPUNCTURE: CPT | Performed by: EMERGENCY MEDICINE

## 2018-10-17 PROCEDURE — 77030003560 HC NDL HUBR BARD -A

## 2018-10-17 PROCEDURE — 51701 INSERT BLADDER CATHETER: CPT

## 2018-10-17 PROCEDURE — 99284 EMERGENCY DEPT VISIT MOD MDM: CPT

## 2018-10-17 PROCEDURE — 74011250637 HC RX REV CODE- 250/637: Performed by: FAMILY MEDICINE

## 2018-10-17 PROCEDURE — 77030005563 HC CATH URETH INT MMGH -A

## 2018-10-17 PROCEDURE — 82962 GLUCOSE BLOOD TEST: CPT

## 2018-10-17 PROCEDURE — 65270000029 HC RM PRIVATE

## 2018-10-17 PROCEDURE — 74011250637 HC RX REV CODE- 250/637: Performed by: INTERNAL MEDICINE

## 2018-10-17 PROCEDURE — 85025 COMPLETE CBC W/AUTO DIFF WBC: CPT | Performed by: EMERGENCY MEDICINE

## 2018-10-17 PROCEDURE — 73552 X-RAY EXAM OF FEMUR 2/>: CPT

## 2018-10-17 PROCEDURE — 73502 X-RAY EXAM HIP UNI 2-3 VIEWS: CPT

## 2018-10-17 PROCEDURE — 80053 COMPREHEN METABOLIC PANEL: CPT | Performed by: EMERGENCY MEDICINE

## 2018-10-17 PROCEDURE — 81001 URINALYSIS AUTO W/SCOPE: CPT | Performed by: EMERGENCY MEDICINE

## 2018-10-17 PROCEDURE — 74011250637 HC RX REV CODE- 250/637

## 2018-10-17 PROCEDURE — 70450 CT HEAD/BRAIN W/O DYE: CPT

## 2018-10-17 RX ORDER — OXYBUTYNIN CHLORIDE 5 MG/1
10 TABLET ORAL DAILY
Status: DISCONTINUED | OUTPATIENT
Start: 2018-10-17 | End: 2018-10-23 | Stop reason: HOSPADM

## 2018-10-17 RX ORDER — MAGNESIUM SULFATE 100 %
4 CRYSTALS MISCELLANEOUS AS NEEDED
Status: DISCONTINUED | OUTPATIENT
Start: 2018-10-17 | End: 2018-10-23 | Stop reason: HOSPADM

## 2018-10-17 RX ORDER — SODIUM CHLORIDE 0.9 % (FLUSH) 0.9 %
5-10 SYRINGE (ML) INJECTION AS NEEDED
Status: DISCONTINUED | OUTPATIENT
Start: 2018-10-17 | End: 2018-10-19

## 2018-10-17 RX ORDER — PRAVASTATIN SODIUM 40 MG/1
80 TABLET ORAL
Status: DISCONTINUED | OUTPATIENT
Start: 2018-10-17 | End: 2018-10-23 | Stop reason: HOSPADM

## 2018-10-17 RX ORDER — AMIODARONE HYDROCHLORIDE 200 MG/1
200 TABLET ORAL DAILY
Status: DISCONTINUED | OUTPATIENT
Start: 2018-10-17 | End: 2018-10-23 | Stop reason: HOSPADM

## 2018-10-17 RX ORDER — OXYCODONE HYDROCHLORIDE 5 MG/1
5 TABLET ORAL
Status: COMPLETED | OUTPATIENT
Start: 2018-10-17 | End: 2018-10-17

## 2018-10-17 RX ORDER — OXYCODONE AND ACETAMINOPHEN 5; 325 MG/1; MG/1
1 TABLET ORAL
Status: DISCONTINUED | OUTPATIENT
Start: 2018-10-17 | End: 2018-10-23 | Stop reason: HOSPADM

## 2018-10-17 RX ORDER — ACETAMINOPHEN 325 MG/1
650 TABLET ORAL
Status: DISCONTINUED | OUTPATIENT
Start: 2018-10-17 | End: 2018-10-23 | Stop reason: HOSPADM

## 2018-10-17 RX ORDER — OXYCODONE HYDROCHLORIDE 5 MG/1
TABLET ORAL
Status: COMPLETED
Start: 2018-10-17 | End: 2018-10-17

## 2018-10-17 RX ORDER — DEXTROSE 50 % IN WATER (D50W) INTRAVENOUS SYRINGE
12.5-25 AS NEEDED
Status: DISCONTINUED | OUTPATIENT
Start: 2018-10-17 | End: 2018-10-23 | Stop reason: HOSPADM

## 2018-10-17 RX ORDER — SODIUM CHLORIDE 0.9 % (FLUSH) 0.9 %
5-10 SYRINGE (ML) INJECTION EVERY 8 HOURS
Status: DISCONTINUED | OUTPATIENT
Start: 2018-10-17 | End: 2018-10-19

## 2018-10-17 RX ADMIN — OXYCODONE HYDROCHLORIDE 5 MG: 5 TABLET ORAL at 02:08

## 2018-10-17 RX ADMIN — Medication 10 ML: at 14:17

## 2018-10-17 RX ADMIN — AMIODARONE HYDROCHLORIDE 200 MG: 200 TABLET ORAL at 11:07

## 2018-10-17 RX ADMIN — ACETAMINOPHEN 650 MG: 325 TABLET ORAL at 14:17

## 2018-10-17 RX ADMIN — Medication 10 ML: at 22:01

## 2018-10-17 RX ADMIN — OXYBUTYNIN CHLORIDE 10 MG: 5 TABLET ORAL at 11:07

## 2018-10-17 RX ADMIN — PRAVASTATIN SODIUM 80 MG: 40 TABLET ORAL at 22:00

## 2018-10-17 NOTE — ED PROVIDER NOTES
The patient presents to the ED with L hip pain. She had a ground level fall yesterday at 12 PM while trying to change her brief. She normally walks with a walker and was initially able to ambulate. Tonight she she developed increased pain and is unable to ambulate. She notes severe pain which has not been relieved with Tylenol. She also states she may have hit her head in her fall. She denies prior hip surgery. She denies having an orthopedic MD. Hx difficult to obtain given hx dementia. The history is provided by the patient and the EMS personnel. The patient presents to the ED with complaint of fall and L hip pain. Past Medical History:  
Diagnosis Date  Atrial fibrillation ([address]  Atrial fibrillation ([address]  Bladder cancer ([address]  Constipation  Diabetes ([address]  Diverticulitis  DM (diabetes mellitus) ([address]  Essential hypertension  Hemorrhoids  HTN (hypertension)  Hyperlipidemia Past Surgical History:  
Procedure Laterality Date  HX HYSTERECTOMY  HX PACEMAKER PLACEMENT Left  HX TUBAL LIGATION    
 POLYPECTOMY- HOT BX Family History:  
Problem Relation Age of Onset  Hypertension Mother  Stroke Mother  Liver Disease Father  Hypertension Father Social History Socioeconomic History  Marital status: SINGLE Spouse name: Not on file  Number of children: Not on file  Years of education: Not on file  Highest education level: Not on file Social Needs  Financial resource strain: Not on file  Food insecurity - worry: Not on file  Food insecurity - inability: Not on file  Transportation needs - medical: Not on file  Transportation needs - non-medical: Not on file Occupational History  Not on file Tobacco Use  Smoking status: Never Smoker  Smokeless tobacco: Never Used Substance and Sexual Activity  Alcohol use: No  
 Drug use: No  
 Sexual activity: No  
 Other Topics Concern  Not on file Social History Narrative ** Merged History Encounter ** ALLERGIES: Erythromycin; Flagyl [metronidazole]; Macrobid [nitrofurantoin monohyd/m-cryst]; Pcn [penicillins]; and Sulfa (sulfonamide antibiotics) Review of Systems Constitutional: Negative for fever. HENT: Negative. Eyes: Negative. Respiratory: Negative for cough and shortness of breath. Cardiovascular: Negative. Negative for chest pain. Gastrointestinal: Negative. Negative for abdominal pain, nausea and vomiting. Endocrine: Negative. Genitourinary: Negative. Musculoskeletal: Positive for gait problem. L hip pain. Skin: Negative. Allergic/Immunologic: Negative. Neurological: Negative for dizziness and light-headedness. Hematological: Negative. Psychiatric/Behavioral: Negative. All other systems reviewed and are negative. Vitals:  
 10/17/18 0151 BP: 131/80 Pulse: 77 Resp: 16 Temp: 98.8 °F (37.1 °C) Weight: 71.3 kg (157 lb 3 oz) Physical Exam  
Constitutional: She appears well-developed and well-nourished. HENT:  
Head: Normocephalic and atraumatic. Eyes: EOM are normal. Pupils are equal, round, and reactive to light. Neck: Normal range of motion. Neck supple. Cardiovascular: Normal rate and regular rhythm. Pulmonary/Chest: Effort normal and breath sounds normal.  
Abdominal: Soft. Bowel sounds are normal.  
Musculoskeletal: She exhibits no edema, tenderness or deformity. The L leg is not shortened or rotated. Pain with any movement of L leg at the hip. She complains of hip from L hip through L knee. Strong L DP pulse. Neurological: She is alert. Oriented to person and place. Skin: Skin is warm and dry. Psychiatric: She has a normal mood and affect. Nursing note and vitals reviewed. MDM Number of Diagnoses or Management Options Abnormality of gait:  
Fall, initial encounter:  
Left hip pain: Diagnosis management comments: 6:57 AM 
CONSULT: 
Dr. May Adeline - will admit. A/P: 
1. Fall - Mechanical 
2. L hip pain - CT negative but with continued pain. 3. Abnormality of gait -  Unable to ambulate in ED. Will admit for PT eval and orthopedic evaluation. Procedures

## 2018-10-17 NOTE — CONSULTS
Ortho Consult  Patient: Vanessa Laureano  YOB: 1940   MRN: [id number]      Consult Date: 10/17/2018     Chief Complaint: Left hip pain  ED Presentation Time: 8-24 hours  Mechanism of Injury: Fall from standing  Ambulatory Status: Meghan Pressley  Past Medical History:   Past Medical History:   Diagnosis Date    Atrial fibrillation ([facility]     Atrial fibrillation ([facility]     Bladder [facility]     Constipation     Diabetes ([facility]     Diverticulitis     DM (diabetes mellitus) ([facility]     Essential hypertension     Hemorrhoids     HTN (hypertension)     Hyperlipidemia        Allergies:    Allergies   Allergen Reactions    Erythromycin Hives    Flagyl [Metronidazole] Hives    Macrobid [Nitrofurantoin Monohyd/M-Cryst] Hives    Pcn [Penicillins] Hives    Sulfa (Sulfonamide Antibiotics) Hives      Past Surgical History:   Past Surgical History:   Procedure Laterality Date    HX HYSTERECTOMY      HX PACEMAKER PLACEMENT Left     HX TUBAL LIGATION      POLYPECTOMY- HOT BX        Social History:   Social History     Socioeconomic History    Marital status: SINGLE     Spouse name: Not on file    Number of children: Not on file    Years of education: Not on file    Highest education level: Not on file   Social Needs    Financial resource strain: Not on file    Food insecurity - worry: Not on file    Food insecurity - inability: Not on file   Lazarus Effect needs - medical: Not on file   Lazarus Effect needs - non-medical: Not on file   Occupational History    Not on file   Tobacco Use    Smoking status: Never Smoker    Smokeless tobacco: Never Used   Substance and Sexual Activity    Alcohol use: No    Drug use: No    Sexual activity: No   Other Topics Concern    Not on file   Social History Narrative    ** Merged History Encounter **             Current Anticoagulant Medications: none  History of falls: NO    Imaging Review:  EXAM:  XR HIP LT W OR WO PELV 2-3 VWS, XR FEMUR LT 2 V INDICATION:   Left hip pain since fall one day ago, difficulty ambulating pain. COMPARISON: None. TECHNIQUE: AP pelvis and frog-leg lateral left hip view; 4 images of AP and   lateral left femur views (2 separate studies reported together)     FINDINGS: No acute fracture or dislocation. Bones are osteopenic. Moderate   bilateral hip osteoarthritis. No evidence of pelvis fracture. No mid or distal   femur fracture. Left knee osteoarthritis is partially imaged but at least   moderate. IMPRESSION:      No fracture. CT LOW EXT LT WO CONT     INDICATION:  Left hip pain after fall. Unable to ambulate secondary to pain. COMPARISON: None     TECHNIQUE: Helical CT of the left hip with coronal and sagittal reformats. Images reviewed in soft tissue and bone windows.  CT dose reduction was achieved   through the use of a standardized protocol tailored for this examination and   automatic exposure control for dose modulation. CONTRAST: None. FINDINGS: Bones: Bones are osteopenic. No acute fracture. No aggressive bone   lesion. Joint fluid:  None. Articulations: Mild left hip osteoarthritis. Labral chondrocalcinosis. Tendons: No full-thickness tendon tear. Muscles: Mild diffuse atrophy. Soft tissue mass: None. No acute process in the partially imaged pelvis. IMPRESSION:      No fracture. Physical Exam:   General: 70yo female, poor historian, cooperative, no distress, appears older than stated age  CNS: oriented, normal mood  Vascular: pedal pulses normal and no edema   Skin: no rash or abnormalities  Extremities: no leg length discrepancy, pain/limited ROM left hip and knee  Neurologic: motor/sensation normal    Assessment: Left hip pain    Plan: Has hip and knee DJD that may be contributing. Suspect her recent fall has exacerbated symptoms of underlying degenerative changes. Will MRI left hip to rule out occult fracture.     Signed by: ROBERTO Cardona Today's Date: October 17, 2018

## 2018-10-17 NOTE — H&P
HISTORY AND PHYSICAL 
 
 
PCP: Rod Lauren MD 
History source: Patient and medical records. CC: Left hip pain HPI: A 66year old female patient with PMH of stage II bladder cancer s/p TURBT, chemo and XRT, Afib - darius s/p pacemaker, Hx bilateral DVT, CVA - hemorrhagic, DM, HTN, HLD and dementia presented to [facility] for evaluation of left leg pain secondary to fall. Patient had hx ambulatory dysfunction and uses a walker. She is a poor historian and most history obtained from medical records. She had a mechanical fall by using a walker. She fell and hit her head. Denied loc. Since then she is experiencing severe pain of left leg, can not able to move or bear weight. Denied pain anywhere else. She has no bruising or  swelling of left leg. She denied chest pain, cough, headache , nausea, abd pain, urinary or bowel changes. In ED, CT head was negative. CT left leg showed no fractures. Hospitalist called in for admission. PMH/PSH: 
Past Medical History:  
Diagnosis Date  Atrial fibrillation ([address]  Atrial fibrillation ([address]  Bladder cancer ([address]  Constipation  Diabetes ([address]  Diverticulitis  DM (diabetes mellitus) ([address]  Essential hypertension  Hemorrhoids  HTN (hypertension)  Hyperlipidemia Past Surgical History:  
Procedure Laterality Date  HX HYSTERECTOMY  HX PACEMAKER PLACEMENT Left  HX TUBAL LIGATION    
 POLYPECTOMY- HOT BX Home meds:  
Prior to Admission medications Medication Sig Start Date End Date Taking? Authorizing Provider  
amLODIPine (NORVASC) 2.5 mg tablet Take  by mouth daily. Provider, Historical  
diclofenac (VOLTAREN) 1 % gel Apply  to affected area four (4) times daily. Provider, Historical  
oxybutynin (DITROPAN) 5 mg tablet Take 10 mg by mouth daily. Provider, Historical  
pravastatin (PRAVACHOL) 40 mg tablet Take 80 mg by mouth daily.     Provider, Historical  
 methIMAzole (TAPAZOLE) 5 mg tablet Take 7.5 mg by mouth daily. Provider, Historical  
acetaminophen (TYLENOL) 650 mg TbER Take 650 mg by mouth every eight (8) hours. Provider, Historical  
losartan (COZAAR) 50 mg tablet Take 50 mg by mouth two (2) times a day. Provider, Historical  
amiodarone (CORDARONE) 200 mg tablet Take 200 mg by mouth daily. Provider, Historical  
 
 
Allergies: Allergies Allergen Reactions  Erythromycin Hives  Flagyl [Metronidazole] Hives  Macrobid [Nitrofurantoin Monohyd/M-Cryst] Hives  Pcn [Penicillins] Hives  Sulfa (Sulfonamide Antibiotics) Hives FH: 
Family History Problem Relation Age of Onset  Hypertension Mother  Stroke Mother  Liver Disease Father  Hypertension Father SH: Social History Tobacco Use  Smoking status: Never Smoker  Smokeless tobacco: Never Used Substance Use Topics  Alcohol use: No  
 
 
ROS: A comprehensive review of systems was negative. PHYSICAL EXAM: 
Visit Vitals /77 (BP 1 Location: Right arm) Pulse 60 Temp 98.2 °F (36.8 °C) Resp 18 Ht 5' 5\" (1.651 m) Wt 71.3 kg (157 lb 3 oz) SpO2 99% BMI 26.16 kg/m² Gen: Moderate distress due to pain. HEENT: anicteric sclerae, normal conjunctiva, oropharynx clear, MM moist 
Neck: supple, trachea midline, no adenopathy Heart: RRR, no MRG, no JVD, no peripheral edema Lungs: CTA b/l, non-labored respirations Abd: soft, NT, ND, BS+, no organomegaly Extr: warm. LLE: ROM restricted due to pain. No erythema, swelling Skin: dry, no rash Neuro: CN II-XII grossly intact, normal speech, moves all extremities Psych: normal mood, appropriate affect Labs/Imaging: 
Recent Results (from the past 24 hour(s)) URINALYSIS W/ REFLEX CULTURE Collection Time: 10/17/18  5:30 AM  
Result Value Ref Range Color YELLOW/STRAW Appearance CLEAR CLEAR Specific gravity 1.015 1.003 - 1.030    
 pH (UA) 6.0 5.0 - 8.0 Protein NEGATIVE  NEG mg/dL Glucose NEGATIVE  NEG mg/dL Ketone NEGATIVE  NEG mg/dL Bilirubin NEGATIVE  NEG Blood NEGATIVE  NEG Urobilinogen 0.2 0.2 - 1.0 EU/dL Nitrites POSITIVE (A) NEG Leukocyte Esterase TRACE (A) NEG    
 WBC 0-4 0 - 4 /hpf  
 RBC 0-5 0 - 5 /hpf Epithelial cells FEW FEW /lpf Bacteria NEGATIVE  NEG /hpf  
 UA:UC IF INDICATED CULTURE NOT INDICATED BY UA RESULT    
SAMPLES BEING HELD Collection Time: 10/17/18  5:30 AM  
Result Value Ref Range SAMPLES BEING HELD  1BLUE   
 COMMENT Add-on orders for these samples will be processed based on acceptable specimen integrity and analyte stability, which may vary by analyte. CBC WITH AUTOMATED DIFF Collection Time: 10/17/18  5:49 AM  
Result Value Ref Range WBC 5.6 3.6 - 11.0 K/uL  
 RBC 3.95 3.80 - 5.20 M/uL  
 HGB 10.8 (L) 11.5 - 16.0 g/dL HCT 34.8 (L) 35.0 - 47.0 % MCV 88.1 80.0 - 99.0 FL  
 MCH 27.3 26.0 - 34.0 PG  
 MCHC 31.0 30.0 - 36.5 g/dL  
 RDW 15.7 (H) 11.5 - 14.5 % PLATELET 878 165 - 545 K/uL MPV 12.1 8.9 - 12.9 FL  
 NRBC 0.0 0  WBC ABSOLUTE NRBC 0.00 0.00 - 0.01 K/uL NEUTROPHILS 90 (H) 32 - 75 % LYMPHOCYTES 6 (L) 12 - 49 % MONOCYTES 4 (L) 5 - 13 % EOSINOPHILS 0 0 - 7 % BASOPHILS 0 0 - 1 % IMMATURE GRANULOCYTES 0 %  
 ABS. NEUTROPHILS 5.1 1.8 - 8.0 K/UL  
 ABS. LYMPHOCYTES 0.3 (L) 0.8 - 3.5 K/UL  
 ABS. MONOCYTES 0.2 0.0 - 1.0 K/UL  
 ABS. EOSINOPHILS 0.0 0.0 - 0.4 K/UL  
 ABS. BASOPHILS 0.0 0.0 - 0.1 K/UL  
 ABS. IMM. GRANS. 0.0 K/UL  
 DF MANUAL    
 RBC COMMENTS ANISOCYTOSIS 1+ 
    
 RBC COMMENTS OVALOCYTES PRESENT 
    
METABOLIC PANEL, COMPREHENSIVE Collection Time: 10/17/18  5:49 AM  
Result Value Ref Range Sodium 142 136 - 145 mmol/L Potassium 3.5 3.5 - 5.1 mmol/L Chloride 105 97 - 108 mmol/L  
 CO2 30 21 - 32 mmol/L Anion gap 7 5 - 15 mmol/L Glucose 92 65 - 100 mg/dL  BUN 14 6 - 20 MG/DL  
 Creatinine 1.00 0.55 - 1.02 MG/DL  
 BUN/Creatinine ratio 14 12 - 20 GFR est AA >60 >60 ml/min/1.73m2 GFR est non-AA 54 (L) >60 ml/min/1.73m2 Calcium 8.9 8.5 - 10.1 MG/DL Bilirubin, total 0.8 0.2 - 1.0 MG/DL  
 ALT (SGPT) 32 12 - 78 U/L  
 AST (SGOT) 43 (H) 15 - 37 U/L Alk. phosphatase 82 45 - 117 U/L Protein, total 6.9 6.4 - 8.2 g/dL Albumin 2.9 (L) 3.5 - 5.0 g/dL Globulin 4.0 2.0 - 4.0 g/dL A-G Ratio 0.7 (L) 1.1 - 2.2 Recent Labs 10/17/18 
3383 WBC 5.6 HGB 10.8* HCT 34.8*  
 Recent Labs 10/17/18 
3842   
K 3.5  CO2 30 BUN 14  
CREA 1.00 GLU 92  
CA 8.9 Recent Labs 10/17/18 
1743 SGOT 43* ALT 32  
AP 82 TBILI 0.8 TP 6.9 ALB 2.9*  
GLOB 4.0 No results for input(s): CPK, CKNDX, TROIQ in the last 72 hours. No lab exists for component: CPKMB No results for input(s): INR, PTP, APTT in the last 72 hours. No lab exists for component: INREXT No results for input(s): PH, PCO2, PO2 in the last 72 hours. Assessment & Plan:  
 
Left leg pain s/p mechanical fall Hx ambulatory dysfunction 
- uses walker 
- admitted from [place] [facility] 
- CT LLE: No fracture. Mild left hip osteoarthritis. Labral chondrocalcinosis - Ortho consulted: Suspect her recent fall has exacerbated symptoms of underlying degenerative changes. Will MRI left hip to rule out occult fracture. 
- PT/OT consulted Hx stage II bladder cancer s/p TURBT, chemo and XRT 
- follows with [facility] oncology - Hx urinary incontinence: c/w oxybutnin Afib - darius s/p pacemaker 
- rate controlled on amiodarone Hx bilateral DVT 
CVA - hemorrhagic DM - ISS, A1c ordered HLD - statin HTN- c/w losartan. amlodipine on hold Hyperthyroidism- TSH ordered. C/w methimazole DVT ppx:  SCDs Code status: Full Disposition: TBD. Signed By: Bridget Acosta MD   
 October 17, 2018

## 2018-10-17 NOTE — PROGRESS NOTES
Physical Therapy Orders received and chart reviewed. Met with patient who is in bed. Complaining of LLE pain 8/10 located in thigh area. Unable to move LLE due to  Pain. Per patient, prior to fall she was ambulatory with RW in her apartment, managing dressing, toileting, and ambulating to meals. She had assistance for bathing. She states she fell in her apartment. She has been at this assisted living per patient for 2 weeks. At this time aborted evaluation due to pain level as well as awaiting ortho consult. Per chart, testing clear of fracture. Will follow up after ortho consult.  
Kassandra Odonnell, PT

## 2018-10-17 NOTE — PROGRESS NOTES
TRANSFER - IN REPORT: 
 
Verbal report received from [facility] (name) on Larene Roles  being received from [address] (unit) for routine progression of care Report consisted of patients Situation, Background, Assessment and  
Recommendations(SBAR). Information from the following report(s) SBAR, ED Summary, [facility] and Recent Results was reviewed with the receiving nurse. Opportunity for questions and clarification was provided. Assessment completed upon patients arrival to unit and care assumed. 1700 MRI called. [facility] is not equipped to perform MRI on patients with pacemakers. In order for the patient to have the MRI, she will need to be transferred to [facility].

## 2018-10-17 NOTE — PROGRESS NOTES
NUTRITION BRIEF Pt direct admit for Hip pain after fall. RD noted past medical hx of DM however, pt reports she hasn't been taking DM medications lately & \"they check my sugars every once in a while\". When asked she reports she uses regular sugar and given recent medical problems including bladder cancer with Chemo & last A1c is WDL. RD does not feel an immediate need to restrict her diet. Will monitor POC glucose and determine needs to adjust diet orders. Lab Results Component Value Date/Time Hemoglobin A1c 5.6 10/17/2018 05:49 AM  
 Hemoglobin A1c 6.7 (H) 11/15/2016 11:46 AM  
 
 
Recommendations: 1. Regular Diet Glendy Schafer, RD, MS, CDE

## 2018-10-17 NOTE — PROGRESS NOTES
I received request for patient direct admission/ transfer from ER MD - Dr. Murphy Melgar. Patient reportedly is unable to walk s/p fall with subsequent L hip pain. Workup was negative for acute fracture but ortho consult is requested per ED. Patient reportedly is unable to return home due to the aforementioned. Per ED report, current vital signs are BP= 135/65, HR= 60, O2sat= 96% on room air.   Orders will be placed to admit for observation and ortho consultation on arrival.

## 2018-10-17 NOTE — PROGRESS NOTES
Primary Nurse Zohaib Gunter and Mundo Bridges RN performed a dual skin assessment on this patient No impairment noted Mendez score is 17

## 2018-10-17 NOTE — ED NOTES
Remains in xray. Tech notified MD that pt now states she hit her head, and is c/o a headache. Additional imaging ordered.

## 2018-10-17 NOTE — PROGRESS NOTES
Problem: Falls - Risk of 
Goal: *Absence of Falls Document Patrice Hunger Fall Risk and appropriate interventions in the flowsheet. Outcome: Progressing Towards Goal 
Fall Risk Interventions: 
  
 
Mentation Interventions: Door open when patient unattended, Bed/chair exit alarm Medication Interventions: Evaluate medications/consider consulting pharmacy Elimination Interventions: Call light in reach, Bed/chair exit alarm History of Falls Interventions: Bed/chair exit alarm, Door open when patient unattended Problem: Pressure Injury - Risk of 
Goal: *Prevention of pressure injury Document Mendez Scale and appropriate interventions in the flowsheet. Outcome: Progressing Towards Goal 
Pressure Injury Interventions: 
  
 
Moisture Interventions: Absorbent underpads Activity Interventions: Increase time out of bed, PT/OT evaluation Mobility Interventions: Pressure redistribution bed/mattress (bed type) Nutrition Interventions: Document food/fluid/supplement intake Friction and Shear Interventions: Lift sheet, Minimize layers

## 2018-10-17 NOTE — ED NOTES
The documentation for this period is being entered following the guidelines as defined in the [facility] by Primo Moraes RN.

## 2018-10-18 LAB
ANION GAP SERPL CALC-SCNC: 7 MMOL/L (ref 5–15)
BACTERIA SPEC CULT: NORMAL
BACTERIA SPEC CULT: NORMAL
BASOPHILS # BLD: 0 K/UL (ref 0–0.1)
BASOPHILS NFR BLD: 0 % (ref 0–1)
BUN SERPL-MCNC: 14 MG/DL (ref 6–20)
BUN/CREAT SERPL: 19 (ref 12–20)
CALCIUM SERPL-MCNC: 8.5 MG/DL (ref 8.5–10.1)
CHLORIDE SERPL-SCNC: 106 MMOL/L (ref 97–108)
CO2 SERPL-SCNC: 27 MMOL/L (ref 21–32)
CREAT SERPL-MCNC: 0.75 MG/DL (ref 0.55–1.02)
DIFFERENTIAL METHOD BLD: ABNORMAL
EOSINOPHIL # BLD: 0 K/UL (ref 0–0.4)
EOSINOPHIL NFR BLD: 0 % (ref 0–7)
ERYTHROCYTE [DISTWIDTH] IN BLOOD BY AUTOMATED COUNT: 15.6 % (ref 11.5–14.5)
GLUCOSE BLD STRIP.AUTO-MCNC: 101 MG/DL (ref 65–100)
GLUCOSE BLD STRIP.AUTO-MCNC: 111 MG/DL (ref 65–100)
GLUCOSE BLD STRIP.AUTO-MCNC: 87 MG/DL (ref 65–100)
GLUCOSE BLD STRIP.AUTO-MCNC: 98 MG/DL (ref 65–100)
GLUCOSE SERPL-MCNC: 88 MG/DL (ref 65–100)
HCT VFR BLD AUTO: 34.7 % (ref 35–47)
HGB BLD-MCNC: 11 G/DL (ref 11.5–16)
IMM GRANULOCYTES # BLD: 0 K/UL (ref 0–0.04)
IMM GRANULOCYTES NFR BLD AUTO: 0 % (ref 0–0.5)
LYMPHOCYTES # BLD: 0.5 K/UL (ref 0.8–3.5)
LYMPHOCYTES NFR BLD: 9 % (ref 12–49)
MCH RBC QN AUTO: 28.1 PG (ref 26–34)
MCHC RBC AUTO-ENTMCNC: 31.7 G/DL (ref 30–36.5)
MCV RBC AUTO: 88.7 FL (ref 80–99)
MONOCYTES # BLD: 0.4 K/UL (ref 0–1)
MONOCYTES NFR BLD: 8 % (ref 5–13)
NEUTS SEG # BLD: 4.6 K/UL (ref 1.8–8)
NEUTS SEG NFR BLD: 83 % (ref 32–75)
NRBC # BLD: 0 K/UL (ref 0–0.01)
NRBC BLD-RTO: 0 PER 100 WBC
PLATELET # BLD AUTO: 110 K/UL (ref 150–400)
PMV BLD AUTO: 11.1 FL (ref 8.9–12.9)
POTASSIUM SERPL-SCNC: 3.7 MMOL/L (ref 3.5–5.1)
RBC # BLD AUTO: 3.91 M/UL (ref 3.8–5.2)
SERVICE CMNT-IMP: ABNORMAL
SERVICE CMNT-IMP: ABNORMAL
SERVICE CMNT-IMP: NORMAL
SODIUM SERPL-SCNC: 140 MMOL/L (ref 136–145)
TSH SERPL DL<=0.05 MIU/L-ACNC: 6.58 UIU/ML (ref 0.36–3.74)
WBC # BLD AUTO: 5.5 K/UL (ref 3.6–11)

## 2018-10-18 PROCEDURE — 84443 ASSAY THYROID STIM HORMONE: CPT | Performed by: FAMILY MEDICINE

## 2018-10-18 PROCEDURE — 36415 COLL VENOUS BLD VENIPUNCTURE: CPT | Performed by: FAMILY MEDICINE

## 2018-10-18 PROCEDURE — 97165 OT EVAL LOW COMPLEX 30 MIN: CPT | Performed by: OCCUPATIONAL THERAPIST

## 2018-10-18 PROCEDURE — 97161 PT EVAL LOW COMPLEX 20 MIN: CPT

## 2018-10-18 PROCEDURE — 97530 THERAPEUTIC ACTIVITIES: CPT

## 2018-10-18 PROCEDURE — 65270000029 HC RM PRIVATE

## 2018-10-18 PROCEDURE — G8987 SELF CARE CURRENT STATUS: HCPCS | Performed by: OCCUPATIONAL THERAPIST

## 2018-10-18 PROCEDURE — 80048 BASIC METABOLIC PNL TOTAL CA: CPT | Performed by: FAMILY MEDICINE

## 2018-10-18 PROCEDURE — 85025 COMPLETE CBC W/AUTO DIFF WBC: CPT | Performed by: FAMILY MEDICINE

## 2018-10-18 PROCEDURE — 82962 GLUCOSE BLOOD TEST: CPT

## 2018-10-18 PROCEDURE — G8988 SELF CARE GOAL STATUS: HCPCS | Performed by: OCCUPATIONAL THERAPIST

## 2018-10-18 PROCEDURE — 74011250637 HC RX REV CODE- 250/637: Performed by: INTERNAL MEDICINE

## 2018-10-18 PROCEDURE — 99218 HC RM OBSERVATION: CPT

## 2018-10-18 PROCEDURE — 74011250637 HC RX REV CODE- 250/637: Performed by: FAMILY MEDICINE

## 2018-10-18 RX ORDER — LOSARTAN POTASSIUM 50 MG/1
50 TABLET ORAL 2 TIMES DAILY
Status: DISCONTINUED | OUTPATIENT
Start: 2018-10-18 | End: 2018-10-23 | Stop reason: HOSPADM

## 2018-10-18 RX ORDER — METHIMAZOLE 5 MG/1
7.5 TABLET ORAL DAILY
Status: DISCONTINUED | OUTPATIENT
Start: 2018-10-18 | End: 2018-10-23 | Stop reason: HOSPADM

## 2018-10-18 RX ADMIN — METHIMAZOLE 7.5 MG: 5 TABLET ORAL at 11:21

## 2018-10-18 RX ADMIN — Medication 10 ML: at 06:00

## 2018-10-18 RX ADMIN — PRAVASTATIN SODIUM 80 MG: 40 TABLET ORAL at 21:18

## 2018-10-18 RX ADMIN — LOSARTAN POTASSIUM 50 MG: 50 TABLET, FILM COATED ORAL at 17:28

## 2018-10-18 RX ADMIN — AMIODARONE HYDROCHLORIDE 200 MG: 200 TABLET ORAL at 09:54

## 2018-10-18 RX ADMIN — Medication 10 ML: at 21:18

## 2018-10-18 RX ADMIN — ACETAMINOPHEN 650 MG: 325 TABLET ORAL at 01:19

## 2018-10-18 RX ADMIN — OXYBUTYNIN CHLORIDE 10 MG: 5 TABLET ORAL at 09:54

## 2018-10-18 RX ADMIN — LOSARTAN POTASSIUM 50 MG: 50 TABLET, FILM COATED ORAL at 11:23

## 2018-10-18 RX ADMIN — Medication 10 ML: at 13:27

## 2018-10-18 RX ADMIN — OXYCODONE AND ACETAMINOPHEN 1 TABLET: 5; 325 TABLET ORAL at 21:18

## 2018-10-18 NOTE — PROGRESS NOTES
Bedside and Verbal shift change report given to [address] (oncoming nurse) by [address] (offgoing nurse). Report included the following information SBAR, Kardex, Intake/Output, MAR, Recent Results and Med Rec Status.

## 2018-10-18 NOTE — PROGRESS NOTES
Problem: Falls - Risk of 
Goal: *Absence of Falls Document Tammy Deal Fall Risk and appropriate interventions in the flowsheet. Outcome: Progressing Towards Goal 
Fall Risk Interventions: 
  
 
Mentation Interventions: Door open when patient unattended Medication Interventions: Evaluate medications/consider consulting pharmacy Elimination Interventions: Call light in reach, Patient to call for help with toileting needs, Toilet paper/wipes in reach, Toileting schedule/hourly rounds History of Falls Interventions: Bed/chair exit alarm, Door open when patient unattended

## 2018-10-18 NOTE — PROGRESS NOTES
..Bedside shift change report given to Jennifer Escobar RN (oncoming nurse) by Luther Zambrano RN (offgoing nurse). Report included the following information SBAR, Kardex and MAR.

## 2018-10-18 NOTE — PROGRESS NOTES
Problem: Self Care Deficits Care Plan (Adult) Goal: *Acute Goals and Plan of Care (Insert Text) Occupational Therapy Goals Initiated 10/18/2018 1. Patient will perform grooming and UE ADL's seated edge of bed with supervision/set-up within 7 day(s). 2.  Patient will perform lower body dressing with moderate assistance  within 7 day(s). 3.  Patient will perform toilet transfer with minimal assistance/contact guard assist within 7 day(s). 4.  Patient will perform all aspects of toileting with minimal assistance/contact guard assist within 7 day(s). 5.  Patient will participate in upper extremity therapeutic exercise/activities with supervision/set-up for 10 minutes within 7 day(s). Occupational Therapy EVALUATION Patient: China Olson (58 y.o. female) Date: 10/18/2018 Primary Diagnosis: Inability to walk Fall Left hip pain Precautions:   Fall ASSESSMENT : 
Based on the objective data described below, the patient presents with decreased ability to perform basic ADLs and mobility due to left hip pain, decreased balance, decreased activity tolerance, max assist for bed mobility and sit to stand, unable to take step at this time. Overall total assist LE ADL's, CGA to min assist UE ADL's, total assist toileting and max assist bathing. Will need rehab at discharge. Patient will benefit from skilled intervention to address the above impairments. Patients rehabilitation potential is considered to be Fair Factors which may influence rehabilitation potential include:  
[]             None noted []             Mental ability/status []             Medical condition [x]             Home/family situation and support systems []             Safety awareness [x]             Pain tolerance/management 
[]             Other: PLAN : 
Recommendations and Planned Interventions: 
[x]               Self Care Training                  [x]        Therapeutic Activities [x]               Functional Mobility Training    []        Cognitive Retraining 
[x]               Therapeutic Exercises           [x]        Endurance Activities [x]               Balance Training                   []        Neuromuscular Re-Education []               Visual/Perceptual Training     [x]   Home Safety Training 
[x]               Patient Education                 [x]        Family Training/Education []               Other (comment): Frequency/Duration: Patient will be followed by occupational therapy 3 times a week to address goals. Discharge Recommendations: Rehab Further Equipment Recommendations for Discharge: SUBJECTIVE:  
Patient stated I'll be going home every day after rehab? Jose Najera OBJECTIVE DATA SUMMARY:  
HISTORY:  
Past Medical History:  
Diagnosis Date  Atrial fibrillation ([facility]  Atrial fibrillation ([facility]  Bladder cancer ([facility]  Constipation  Diabetes ([facility]  Diverticulitis  DM (diabetes mellitus) ([facility]  Essential hypertension  Hemorrhoids  HTN (hypertension)  Hyperlipidemia Past Surgical History:  
Procedure Laterality Date  HX HYSTERECTOMY  HX PACEMAKER PLACEMENT Left  HX TUBAL LIGATION    
 POLYPECTOMY- HOT BX    
 
 
Prior Level of Function/Environment/Context: lives in assisted living, assist with bathing 2 x's/week, independent dressing and sponge bathing, ambulates with rolling walker Occupations in which the patient is/was successful, what are the barriers preventing that success:  
Performance Patterns (routines, roles, habits, and rituals):  
Personal Interests and/or values:  
Expanded or extensive additional review of patient history:  
 
Home Situation Home Environment: Assisted living One/Two Story Residence: One story Living Alone: No 
Support Systems: Other (comments)(assisted living staff) Patient Expects to be Discharged to[de-identified] Skilled nursing facility Current DME Used/Available at Home: Walker, rolling Hand dominance: RightEXAMINATION OF PERFORMANCE DEFICITS: 
Cognitive/Behavioral Status: 
  
  
  
Perception: Appears intact Perseveration: No perseveration noted Safety/Judgement: Awareness of environment; Fall prevention;Decreased awareness of need for assistance;Home safety Skin: intact Edema: none noted Hearing: Auditory Auditory Impairment: Hard of hearing, bilateral 
Vision/Perceptual:   
    
    
    
  
    
    
  
Range of Motion: 
AROM: Within functional limits PROM: Generally decreased, functional 
  
  
  
  
  
  
Strength: 
Strength: Generally decreased, functional 
  
  
  
  
Coordination: 
Coordination: Generally decreased, functional 
    
  
Tone & Sensation: 
Tone: Abnormal 
  
  
  
  
  
  
  
Balance: 
Sitting: Impaired Sitting - Static: Good (unsupported) Sitting - Dynamic: Fair (occasional) Standing: Impaired Standing - Static: Constant support; Fair 
Standing - Dynamic : Poor Functional Mobility and Transfers for ADLs:Bed Mobility: 
Rolling: Maximum assistance Supine to Sit: Maximum assistance Sit to Supine: Maximum assistance Scooting: Maximum assistance Transfers: 
Sit to Stand: Maximum assistance Stand to Sit: Maximum assistance Bed to Chair: Total assistance Bathroom Mobility: Dependent/total assistance Toilet Transfer : Total assistance ADL Assessment: 
  
 
Oral Facial Hygiene/Grooming: Supervision;Setup; Additional time Bathing: Maximum assistance Upper Body Dressing: Setup Lower Body Dressing: Total assistance Toileting: Total assistance ADL Intervention and task modifications: 
  
Educated on role of OT, need for rehab, schedule in rehab and need for 24/7 assist at this time Patient instructed and indicated understanding the benefits of maintaining activity tolerance, functional mobility, and independence with self care tasks during acute stay  to ensure safe return home and to baseline. Encouraged patient to increase frequency and duration OOB, be out of bed for all meals, perform daily ADLs (as approved by RN/MD regarding bathing etc), and performing functional mobility to/from bathroom. Cognitive Retraining Safety/Judgement: Awareness of environment; Fall prevention;Decreased awareness of need for assistance;Home safety Functional Measure: 
Barthel Index: 
 
Bathin Bladder: 0 Bowels: 10 
Groomin Dressin Feeding: 10 Mobility: 0 Stairs: 0 Toilet Use: 0 Transfer (Bed to Chair and Back): 0 Total: 25 Barthel and G-code impairment scale: 
Percentage of impairment CH 
0% CI 
1-19% CJ 
20-39% CK 
40-59% CL 
60-79% CM 
80-99% CN 
100% Barthel Score 0-100 100 99-80 79-60 59-40 20-39 1-19 
 0 Barthel Score 0-20 20 17-19 13-16 9-12 5-8 1-4 0 The Barthel ADL Index: Guidelines 1. The index should be used as a record of what a patient does, not as a record of what a patient could do. 2. The main aim is to establish degree of independence from any help, physical or verbal, however minor and for whatever reason. 3. The need for supervision renders the patient not independent. 4. A patient's performance should be established using the best available evidence. Asking the patient, friends/relatives and nurses are the usual sources, but direct observation and common sense are also important. However direct testing is not needed. 5. Usually the patient's performance over the preceding 24-48 hours is important, but occasionally longer periods will be relevant. 6. Middle categories imply that the patient supplies over 50 per cent of the effort. 7. Use of aids to be independent is allowed. Cecelia Ramirez., Barthel, DChiaraW. ([id number]). Functional evaluation: the Barthel Index. [address] [facility] (14)2.  
JUANJO Hays, Ángel Benton.Ene., Olivia Adeline. (1999). Measuring the change indisability after inpatient rehabilitation; comparison of the responsiveness of the Barthel Index and Functional [place] Measure. Journal of Neurology, Neurosurgery, and Psychiatry, 66(4), 363-388. VISH Gonzalez, MARILIA Najera, & Pavel Lees M.A. (2004.) Assessment of post-stroke quality of life in cost-effectiveness studies: The usefulness of the Barthel Index and the EuroQoL-5D. [facility], 13, 818-31 G codes: In compliance with CMSs Claims Based Outcome Reporting, the following G-code set was chosen for this patient based on their primary functional limitation being treated: The outcome measure chosen to determine the severity of the functional limitation was the Barthel index with a score of 25/100 which was correlated with the impairment scale. ? Self Care:  
  - CURRENT STATUS: CL - 60%-79% impaired, limited or restricted  - GOAL STATUS: CK - 40%-59% impaired, limited or restricted  - D/C STATUS:  ---------------To be determined--------------- Occupational Therapy Evaluation Charge Determination History Examination Decision-Making LOW Complexity : Brief history review  LOW Complexity : 1-3 performance deficits relating to physical, cognitive , or psychosocial skils that result in activity limitations and / or participation restrictions  LOW Complexity : No comorbidities that affect functional and no verbal or physical assistance needed to complete eval tasks Based on the above components, the patient evaluation is determined to be of the following complexity level: LOW Pain: 
Left hip pain with movement/attempt to stand Activity Tolerance:  
Fair to poor, limited by left hip pain Please refer to the flowsheet for vital signs taken during this treatment. After treatment:  
[] Patient left in no apparent distress sitting up in chair 
[x] Patient left in no apparent distress in bed [x] Call bell left within reach [x] Nursing notified 
[] Caregiver present [x] Bed alarm activated COMMUNICATION/EDUCATION:  
The patients plan of care was discussed with: Registered Nurse. [x] Home safety education was provided and the patient/caregiver indicated understanding. [x] Patient/family have participated as able in goal setting and plan of care. [] Patient/family agree to work toward stated goals and plan of care. [] Patient understands intent and goals of therapy, but is neutral about his/her participation. [] Patient is unable to participate in goal setting and plan of care. This patients plan of care is appropriate for delegation to [facility]. Thank you for this referral. 
Vanessa Louis, OTR/L Time Calculation: 13 mins

## 2018-10-18 NOTE — PROGRESS NOTES
Bedside and Verbal shift change report given to SarahyRN (oncoming nurse) by Mattie Montaño RN (offgoing nurse). Report included the following information SBAR.

## 2018-10-18 NOTE — PROGRESS NOTES
Problem: Mobility Impaired (Adult and Pediatric) Goal: *Acute Goals and Plan of Care (Insert Text) Physical Therapy Goals Initiated 10/18/2018 1. Patient will move from supine to sit and sit to supine  and roll side to side in bed with minimal assistance/contact guard assist within 7 day(s). 2.  Patient will transfer from bed to chair and chair to bed with minimal assistance/contact guard assist using the least restrictive device within 7 day(s). 3.  Patient will perform sit to stand with minimal assistance/contact guard assist within 7 day(s). 4.  Patient will ambulate with minimal assistance/contact guard assist for 50 feet with the least restrictive device within 7 day(s). physical Therapy EVALUATION Patient: Rachael William (66 y.o. female) Date: 10/18/2018 Primary Diagnosis: Inability to walk Fall Left hip pain Precautions: fall; contact ASSESSMENT : 
Based on the objective data described below, the patient presents with overall significantly impaired mobility following GLF at her assisted living facility. Per patient she is was ambulatory with RW at facility (mod I) and able to dress self and get to bathroom herself. At this time she is requiring max assist with all bed mobility and sit to stand. Limited assessment to standing at bedside to get to head of bed with instruction to limit weightbearing. Per patient , ROM of LLE eased her pain after several repetitions and no increase in pain with standing. Returned to supine and left in chair position for lunch. At this time she is well below her baseline and would benefit from rehab and recommend inpatient rehab feeling that she can tolerate 3 hours of therapy a day. Still awaiting further instruction from ortho (x-rays and CT clear) Adrian Perez Patient will benefit from skilled intervention to address the above impairments. Patients rehabilitation potential is considered to be Good Factors which may influence rehabilitation potential include:  
[]         None noted 
[]         Mental ability/status []         Medical condition 
[]         Home/family situation and support systems 
[]         Safety awareness 
[]         Pain tolerance/management 
[]         Other: PLAN : 
Recommendations and Planned Interventions: 
[]           Bed Mobility Training             []    Neuromuscular Re-Education []           Transfer Training                   []    Orthotic/Prosthetic Training 
[]           Gait Training                         []    Modalities []           Therapeutic Exercises           []    Edema Management/Control 
[]           Therapeutic Activities            []    Patient and Family Training/Education 
[]           Other (comment): Frequency/Duration: Patient will be followed by physical therapy  5 times a week to address goals. Discharge Recommendations: Rehab and Inpatient Rehab Further Equipment Recommendations for Discharge: none SUBJECTIVE:  
Patient stated I'm glad I didn't break it. Ewa Foster OBJECTIVE DATA SUMMARY:  
HISTORY:   
Past Medical History:  
Diagnosis Date  [facility]rial [facility]  Atrial fibrillation ([facility]  Bladder cancer ([facility]  Constipation  Diabetes ([facility]  Diverticulitis  DM (diabetes mellitus) ([facility]  Essential hypertension  Hemorrhoids  HTN (hypertension)  Hyperlipidemia Past Surgical History:  
Procedure Laterality Date  HX HYSTERECTOMY  HX PACEMAKER PLACEMENT Left  HX TUBAL LIGATION    
 POLYPECTOMY- HOT BX    
 
Prior Level of Function/Home Situation: modified independent with RW in assisted living facility Personal factors and/or comorbidities impacting plan of care: pain 
 
Home Situation Home Environment: Assisted living One/Two Story Residence: One story Living Alone: No 
Support Systems: Other (comments)(assisted living staff) Patient Expects to be Discharged to[de-identified] Skilled nursing facility Current DME Used/Available at Home: Walker, rolling EXAMINATION/PRESENTATION/DECISION MAKING: Critical Behavior: 
Neurologic State: Alert Orientation Level: Oriented to person, Oriented to situation, Disoriented to time Cognition: Appropriate decision making, Appropriate for age attention/concentration, Follows commands Hearing: Auditory Auditory Impairment: Hard of hearing, bilateralSkin:  See nursing notes Edema: none Range Of Motion: 
AROM: Generally decreased, functional 
  
  
  
PROM: Generally decreased, functional 
  
  
  
Strength:   
Strength: Generally decreased, functional 
  
  
  
  
  
  
Tone & Sensation:  
Tone: Normal 
  
  
  
  
  
  
  
  
   
Coordination: 
Coordination: Generally decreased, functional 
Vision:  
  
Functional Mobility: 
Bed Mobility: 
Rolling: Maximum assistance Supine to Sit: Maximum assistance Sit to Supine: Maximum assistance Scooting: Maximum assistance Transfers: 
Sit to Stand: Maximum assistance Stand to Sit: Maximum assistance Balance:  
Sitting: Impaired; Without support Sitting - Static: Good (unsupported) Sitting - Dynamic: Fair (occasional) Standing: Impaired; With support Standing - Static: Fair Standing - Dynamic : PoorAmbulation/Gait Training:Distance (ft): 1 Feet (ft) Assistive Device: Gait belt;Walker, rolling Ambulation - Level of Assistance: Moderate assistance Gait Description (WDL): Exceptions to [facility] Gait Abnormalities: Antalgic;Decreased step clearance Stance: Left decreased Speed/Lavonne: Pace decreased (<100 feet/min); Shuffled Functional Measure: 
Timed up and go: 
 
Timed Get Up And Go Test: 0(unable to ambulate at this time) Timed Up and Go and G-code impairment scale: 
Percentage of Impairment CH 
 
0% 
 CI 
 
1-19% CJ 
 
20-39% CK 
 
40-59% CL 
 
60-79% CM 
 
80-99% CN  
 
100% Timed Score 0-56 10 11-12 13-14 15-16 17-18 19 20  
 
 
< than 10 seconds=Normal  Greater then 13.5 seconds (in elderly)=Increased fall risk Nicholas CAMARA. Predicting the probability for falls in community dwelling older adults using the Timed Up and Go Test. Phys Ther. 2000;80:896-903. G codes: In compliance with CMSs Claims Based Outcome Reporting, the following G-code set was chosen for this patient based on their primary functional limitation being treated: The outcome measure chosen to determine the severity of the functional limitation was the Timed Up and GO with a score of 0 being unable to ambulate which was correlated with the impairment scale. ? Mobility - Walking and Moving Around:  
  - CURRENT STATUS: CN - 100% impaired, limited or restricted  - GOAL STATUS: CM - 80%-99% impaired, limited or restricted  - D/C STATUS:  ---------------To be determined--------------- Physical Therapy Evaluation Charge Determination History Examination Presentation Decision-Making MEDIUM  Complexity : 1-2 comorbidities / personal factors will impact the outcome/ POC  LOW Complexity : 1-2 Standardized tests and measures addressing body structure, function, activity limitation and / or participation in recreation  LOW Complexity : Stable, uncomplicated  Other outcome measures Timed Up and GO  HIGH Based on the above components, the patient evaluation is determined to be of the following complexity level: LOW Pain: 
Pain Scale 1: Numeric (0 - 10) Pain Intensity 1: 4 Pain Location 1: Hip;Leg 
Pain Orientation 1: Left Pain Description 1: Aching Pain Intervention(s) 1: Declines After treatment:  
[]         Patient left in no apparent distress sitting up in chair 
[x]         Patient left in no apparent distress in bed 
[x]         Call bell left within reach [x]         Nursing notified 
[]         Caregiver present [x]         Bed alarm activated COMMUNICATION/EDUCATION:  
The patients plan of care was discussed with: Registered Nurse and . [x]         Fall prevention education was provided and the patient/caregiver indicated understanding. [x]         Patient/family have participated as able in goal setting and plan of care. [x]         Patient/family agree to work toward stated goals and plan of care. []         Patient understands intent and goals of therapy, but is neutral about his/her participation. []         Patient is unable to participate in goal setting and plan of care. Thank you for this referral. 
Bridgette Zuñiga, PT Time Calculation: 23 mins

## 2018-10-19 ENCOUNTER — APPOINTMENT (OUTPATIENT)
Dept: NUCLEAR MEDICINE | Age: 78
DRG: 554 | End: 2018-10-19
Attending: PHYSICIAN ASSISTANT
Payer: MEDICARE

## 2018-10-19 LAB
GLUCOSE BLD STRIP.AUTO-MCNC: 104 MG/DL (ref 65–100)
GLUCOSE BLD STRIP.AUTO-MCNC: 140 MG/DL (ref 65–100)
GLUCOSE BLD STRIP.AUTO-MCNC: 94 MG/DL (ref 65–100)
GLUCOSE BLD STRIP.AUTO-MCNC: 98 MG/DL (ref 65–100)
SERVICE CMNT-IMP: ABNORMAL
SERVICE CMNT-IMP: ABNORMAL
SERVICE CMNT-IMP: NORMAL
SERVICE CMNT-IMP: NORMAL

## 2018-10-19 PROCEDURE — A9503 TC99M MEDRONATE: HCPCS

## 2018-10-19 PROCEDURE — 74011250637 HC RX REV CODE- 250/637: Performed by: INTERNAL MEDICINE

## 2018-10-19 PROCEDURE — 82962 GLUCOSE BLOOD TEST: CPT

## 2018-10-19 PROCEDURE — 74011636637 HC RX REV CODE- 636/637: Performed by: INTERNAL MEDICINE

## 2018-10-19 PROCEDURE — 99218 HC RM OBSERVATION: CPT

## 2018-10-19 PROCEDURE — 65270000029 HC RM PRIVATE

## 2018-10-19 PROCEDURE — 74011250636 HC RX REV CODE- 250/636: Performed by: INTERNAL MEDICINE

## 2018-10-19 RX ORDER — SODIUM CHLORIDE 0.9 % (FLUSH) 0.9 %
10 SYRINGE (ML) INJECTION AS NEEDED
Status: DISCONTINUED | OUTPATIENT
Start: 2018-10-19 | End: 2018-10-23 | Stop reason: HOSPADM

## 2018-10-19 RX ORDER — SODIUM CHLORIDE 0.9 % (FLUSH) 0.9 %
10-30 SYRINGE (ML) INJECTION AS NEEDED
Status: DISCONTINUED | OUTPATIENT
Start: 2018-10-19 | End: 2018-10-23 | Stop reason: HOSPADM

## 2018-10-19 RX ORDER — HEPARIN 100 UNIT/ML
300-500 SYRINGE INTRAVENOUS AS NEEDED
Status: DISCONTINUED | OUTPATIENT
Start: 2018-10-19 | End: 2018-10-23 | Stop reason: HOSPADM

## 2018-10-19 RX ORDER — SODIUM CHLORIDE 0.9 % (FLUSH) 0.9 %
10-40 SYRINGE (ML) INJECTION EVERY 8 HOURS
Status: DISCONTINUED | OUTPATIENT
Start: 2018-10-19 | End: 2018-10-23 | Stop reason: HOSPADM

## 2018-10-19 RX ORDER — ENOXAPARIN SODIUM 100 MG/ML
40 INJECTION SUBCUTANEOUS DAILY
Status: DISCONTINUED | OUTPATIENT
Start: 2018-10-19 | End: 2018-10-23 | Stop reason: HOSPADM

## 2018-10-19 RX ADMIN — PRAVASTATIN SODIUM 80 MG: 40 TABLET ORAL at 21:56

## 2018-10-19 RX ADMIN — HUMAN INSULIN 2 UNITS: 100 INJECTION, SOLUTION SUBCUTANEOUS at 12:48

## 2018-10-19 RX ADMIN — LOSARTAN POTASSIUM 50 MG: 50 TABLET, FILM COATED ORAL at 17:37

## 2018-10-19 RX ADMIN — OXYCODONE AND ACETAMINOPHEN 1 TABLET: 5; 325 TABLET ORAL at 02:51

## 2018-10-19 RX ADMIN — OXYCODONE AND ACETAMINOPHEN 1 TABLET: 5; 325 TABLET ORAL at 12:42

## 2018-10-19 RX ADMIN — METHIMAZOLE 7.5 MG: 5 TABLET ORAL at 09:07

## 2018-10-19 RX ADMIN — AMIODARONE HYDROCHLORIDE 200 MG: 200 TABLET ORAL at 09:07

## 2018-10-19 RX ADMIN — Medication 10 ML: at 15:00

## 2018-10-19 RX ADMIN — LOSARTAN POTASSIUM 50 MG: 50 TABLET, FILM COATED ORAL at 09:07

## 2018-10-19 RX ADMIN — OXYCODONE AND ACETAMINOPHEN 1 TABLET: 5; 325 TABLET ORAL at 21:56

## 2018-10-19 RX ADMIN — Medication 10 ML: at 21:57

## 2018-10-19 RX ADMIN — Medication 10 ML: at 14:00

## 2018-10-19 RX ADMIN — OXYCODONE AND ACETAMINOPHEN 1 TABLET: 5; 325 TABLET ORAL at 17:40

## 2018-10-19 RX ADMIN — ENOXAPARIN SODIUM 40 MG: 40 INJECTION SUBCUTANEOUS at 16:25

## 2018-10-19 RX ADMIN — Medication 10 ML: at 09:08

## 2018-10-19 RX ADMIN — OXYBUTYNIN CHLORIDE 10 MG: 5 TABLET ORAL at 09:07

## 2018-10-19 NOTE — PROGRESS NOTES
..Bedside shift change report given to NICKO Palm (oncoming nurse) by Mei Hale (offgoing nurse). Report included the following information SBAR, Kardex and MAR.

## 2018-10-19 NOTE — PHYSICIAN ADVISORY
Letter of Status Determination:  
Recommend hospitalization status upgraded from OBSERVATION  to INPATIENT  Status Pt Name:  Hebert Byrd MR#  
TOM # [id number] / 
[id number] Payor: Jhonny Pederson / Plan: [address] / Product Type: Medicare /   
DEX#  [id number] Room [facility] Hospital  602/02  @ [facility]  
Hospitalization date  10/17/2018  5:08 AM  
Current Attending Physician  Killian Moseley MD  
Principal diagnosis  Left hip pain Clinicals  66 y.o. y.o  female hospitalized with above diagnosis The pt continues to have difficulty with ambulation. She was seen by orthopedic specialist, PT OT during this episode of care. INPT intense rehabilitation is being recommended. She is not safe to return to her prior to admission situation - yet. [place] (MCG) criteria Does  NOT apply STATUS DETERMINATION  This patient is at above high risk of deterioration based on documented presenting clinical data, comorbid conditions, high risk of adverse events and current acute care course. Ms. Hebert Byrd now meets Inpatient Admission status criteria in accordance with CMS regulation Section 43 .3. Specifically, due to medical necessity the patient's stay now exceeds Two Midnights. It is our recommendation that this patient's hospitalization status should be upgraded from  OBSERVATION to INPATIENT status. The final decision of the patient's hospitalization status depends on the attending physician's judgment Additional comments Payor: Jhonny Pace / Plan: [address] / Product Type: Medicare /   
  
 
Sarwat Kurtz MD MPH FACP Cell: [phone] Physician Advisor [address] [place] [address]  
 [facility], [address] [place] [address]  [phone]   
 
 
[phone] Lars Mccall

## 2018-10-19 NOTE — PROGRESS NOTES
Hospitalist Progress Note Ingrid Dubois MD 
Answering service: [phone] OR [phone] from in house phone Date of Service:  10/19/2018 NAME:  Ana M Larson :  1940 MRN:  [id number] Admission Summary:  
74F PMH bladder ca, Afib-s/p ppm, CVA, DM, Dementia was [facility] after a fall, left hip severe pain, evaluated by ortho who planned for MRI Interval history / Subjective:  
Patient seen and examined at bedside, feels ok, still c/o pain in left thigh area, pleasant, no acute events overnight, discharge planning will take another day? .  
 
Assessment & Plan:  
 
Fall/Ambulatory dysfunction 
- uses walker 
- [facility] 
- CT LLE: No fracture. Mild left hip osteoarthritis. Labral chondrocalcinosis - Ortho consulted: Suspect her recent fall has exacerbated symptoms of underlying degenerative changes. Can't have MRI in [facility] due to having PPM 
- PT/OT following, will need SNF, ortho want her to be bedrest. 
  
Hx stage II bladder cancer s/p TURBT, chemo and XRT 
- follows with [facility] oncology - Hx urinary incontinence: c/w oxybutnin 
  
Afib - darius s/p pacemaker- rate controlled on amiodarone Hx bilateral DVT 
CVA - hemorrhagic DM - ISS, A1c ordered HLD - statin HTN- c/w losartan. amlodipine on hold Hyperthyroidism- TSH 6.5. C/w methimazole Code status: Full DVT prophylaxis: Lovenox Care Plan discussed with: Patient/Family and Nurse Disposition: SNF/LTC and TBD Hospital Problems  Date Reviewed: 10/16/2018 Codes Class Noted POA Fall ICD-10-CM: W19. Vamsi Oliver ICD-9-CM: O585.5  10/17/2018 Unknown * (Principal) Left hip pain ICD-10-CM: M25.552 ICD-9-CM: 719.45  10/17/2018 Unknown Inability to walk ICD-10-CM: R26.2 ICD-9-CM: 719.7  10/17/2018 Unknown Review of Systems:  
Pertinent items are mentioned in interval history. Vital Signs: Last 24hrs VS reviewed since prior progress note. Most recent are: 
Visit Vitals /59 (BP 1 Location: Right arm, BP Patient Position: At rest) Pulse (!) 59 Temp 98.4 °F (36.9 °C) Resp 18 Ht 5' 5\" (1.651 m) Wt 71.3 kg (157 lb 3 oz) SpO2 96% BMI 26.16 kg/m² No intake or output data in the 24 hours ending 10/19/18 1227 Physical Examination:  
 
General:  Alert, oriented, No acute distress HEENT:  Pink conjunctivae, hearing intact to voice, moist mucous membranes Card:  S1, S2 without murmurs, good peripheral perfusion, No peripheral edema Resp:  No accessory muscle use, clear breath sounds without wheezes or rhonchi Abd:  Soft, non-tender, non-distended, BS+, no mass Extremities:  No cyanosis or clubbing, no significant edema, tender on left thigh Skin:  No rashes or ulcers, skin turgor is good Psych:  Good insight, AAOx3, not agitated. Data Review:  
 Review and/or order of clinical lab test 
Review and/or order of tests in the radiology section of [facility] Labs:  
 
Recent Labs 10/18/18 
0121 10/17/18 
0905 WBC 5.5 5.6 HGB 11.0* 10.8* HCT 34.7* 34.8*  
* 150 Recent Labs 10/18/18 
0121 10/17/18 
5293  142  
K 3.7 3.5  105 CO2 27 30 BUN 14 14 CREA 0.75 1.00 GLU 88 92 CA 8.5 8.9 Recent Labs 10/17/18 
1351 SGOT 43* ALT 32  
AP 82 TBILI 0.8 TP 6.9 ALB 2.9*  
GLOB 4.0 No results for input(s): INR, PTP, APTT in the last 72 hours. No lab exists for component: INREXT, INREXT No results for input(s): FE, TIBC, PSAT, FERR in the last 72 hours. No results found for: FOL, RBCF No results for input(s): PH, PCO2, PO2 in the last 72 hours. No results for input(s): CPK, CKNDX, TROIQ in the last 72 hours. No lab exists for component: CPKMB Lab Results Component Value Date/Time  Cholesterol, total 167 01/18/2016 12:00 AM  
 HDL Cholesterol 80 01/18/2016 12:00 AM  
 LDL,Direct 86 11/16/2016 04:35 AM  
 LDL, calculated 76 01/18/2016 12:00 AM  
 Triglyceride 54 01/18/2016 12:00 AM  
 
Lab Results Component Value Date/Time Glucose (POC) 140 (H) 10/19/2018 11:15 AM  
 Glucose (POC) 104 (H) 10/19/2018 06:58 AM  
 Glucose (POC) 111 (H) 10/18/2018 09:22 PM  
 Glucose (POC) 98 10/18/2018 04:53 PM  
 Glucose (POC) 101 (H) 10/18/2018 11:16 AM  
 
Lab Results Component Value Date/Time Color YELLOW/STRAW 10/17/2018 05:30 AM  
 Appearance CLEAR 10/17/2018 05:30 AM  
 Specific gravity 1.015 10/17/2018 05:30 AM  
 pH (UA) 6.0 10/17/2018 05:30 AM  
 Protein NEGATIVE  10/17/2018 05:30 AM  
 Glucose NEGATIVE  10/17/2018 05:30 AM  
 Ketone NEGATIVE  10/17/2018 05:30 AM  
 Bilirubin NEGATIVE  10/17/2018 05:30 AM  
 Urobilinogen 0.2 10/17/2018 05:30 AM  
 Nitrites POSITIVE (A) 10/17/2018 05:30 AM  
 Leukocyte Esterase TRACE (A) 10/17/2018 05:30 AM  
 Epithelial cells FEW 10/17/2018 05:30 AM  
 Bacteria NEGATIVE  10/17/2018 05:30 AM  
 WBC 0-4 10/17/2018 05:30 AM  
 RBC 0-5 10/17/2018 05:30 AM  
 
 
Medications Reviewed:  
 
Current Facility-Administered Medications Medication Dose Route Frequency  methIMAzole (TAPAZOLE) tablet 7.5 mg  7.5 mg Oral DAILY  losartan (COZAAR) tablet 50 mg  50 mg Oral BID  sodium chloride (NS) flush 5-10 mL  5-10 mL IntraVENous Q8H  
 sodium chloride (NS) flush 5-10 mL  5-10 mL IntraVENous PRN  
 acetaminophen (TYLENOL) tablet 650 mg  650 mg Oral Q6H PRN  
 insulin regular (NOVOLIN R, HUMULIN R) injection   SubCUTAneous AC&HS  
 glucose chewable tablet 16 g  4 Tab Oral PRN  
 dextrose (D50W) injection syrg 12.5-25 g  12.5-25 g IntraVENous PRN  
 glucagon (GLUCAGEN) injection 1 mg  1 mg IntraMUSCular PRN  
 amiodarone (CORDARONE) tablet 200 mg  200 mg Oral DAILY  oxybutynin (DITROPAN) tablet 10 mg  10 mg Oral DAILY  pravastatin (PRAVACHOL) tablet 80 mg  80 mg Oral QHS  oxyCODONE-acetaminophen (PERCOCET) 5-325 mg per tablet 1 Tab  1 Tab Oral Q4H PRN  
 
 ______________________________________________________________________ EXPECTED LENGTH OF STAY: - - - 
ACTUAL LENGTH OF STAY:          0 Kathie Blevins MD

## 2018-10-19 NOTE — PROGRESS NOTES
Physical Therapy Spoke with nursing and chart reviewed. Patient now on bedrest with MD requesting no ambulation until further studies done for LLE. Will follow up Monday.  
Oswaldo Guzmán, PT

## 2018-10-19 NOTE — PROGRESS NOTES
Medical record reviewed and problems noted. [facility]  [facility] ([facility]isted Living[facility] The patient was admitted her with complaints of a Ground Level Fall. CM met with patient at bedside on 10/18/18. She confirms her residence at the [facility] for the past 2 weeks. She states she prefers to be at [facility] own home\". She gives her daughter Immanuel November ) as her emergency contact/POA; however does not have the phone number. Today, this CM contacted family members listed as emergency contacts with hopes of reaching the daughter Denise Loza). CM spoke with Immanuel November (daughter/MPOA) at [phone]. Agnieszka Edward lives in [place]. CM introduced self and role. The patient also has a son Doreen Cerda [phone] who resides in [place]. The patient has been a resident at [facility] for the past 2 weeks. Prior to that she was living with family in [place]. CM discussed with the daughter previous observation status. Patient has dementia and was unable to sign document. Daughter informed that with obs status, patient d/c option was limited to inpatient rehab for d/c plan (if patient met criteria for inpt rehab). Daughter was informed that status was upgraded to inpatient and there were options for snf and possible inpt rehab. CM discussed plan to d/c rehab at this time as a fx was still being considered. Daughter gives preferences for [facility] [place] [facility] [place] if snf is recommended. Plan: Update demographic sheet to reflect daughter and son as emergency contacts; continue to follow for therapy recommendations for d/c planning.  Calin Stephens, BSW, CRM

## 2018-10-19 NOTE — PROGRESS NOTES
ORTHO PROGRESS NOTE 2018 Admit Date:  
10/17/2018 SUBJECTIVE: 
   
Luiza Poag c/o persistent left thigh sharp pain with movement and inability to amb. She fell onto left side 10/16/18 and states she could amb initially but severe left thigh pain with amb later same day. Baseline walker for amb. OBJECTIVE: 
   
Physical Exam: 
Temp (24hrs), Av.6 °F (37 °C), Min:98.4 °F (36.9 °C), Max:98.8 °F (37.1 °C) Patient Vitals for the past 8 hrs: 
 BP Temp Pulse Resp 10/19/18 0903 119/59 98.4 °F (36.9 °C) (!) 59 18 General: Alert, cooperative, no distress. Answers questions appropriately. Respiratory: Respirations unlabored Neurological:  SILT bilat LE Musculoskeletal: sharp left thigh pain with log roll hip. TTP thigh, GT. Knee NT. No open wound, erythema. LAB:   
Recent Results (from the past 24 hour(s)) GLUCOSE, POC Collection Time: 10/18/18 11:16 AM  
Result Value Ref Range Glucose (POC) 101 (H) 65 - 100 mg/dL Performed by Sander Card GLUCOSE, POC Collection Time: 10/18/18  4:53 PM  
Result Value Ref Range Glucose (POC) 98 65 - 100 mg/dL Performed by [address] [place] [address] Drive, POC Collection Time: 10/18/18  9:22 PM  
Result Value Ref Range Glucose (POC) 111 (H) 65 - 100 mg/dL Performed by Fatuma Boss GLUCOSE, POC Collection Time: 10/19/18  6:58 AM  
Result Value Ref Range Glucose (POC) 104 (H) 65 - 100 mg/dL Performed by Fatuma Boss PT/OT:  
Gait:  Gait Speed/Lavonne: Pace decreased (<100 feet/min), Shuffled Stance: Left decreased Gait Abnormalities: Antalgic, Decreased step clearance Ambulation - Level of Assistance: Moderate assistance Distance (ft): 1 Feet (ft) Assistive Device: Gait belt, Walker, rolling ASSESSMENT & PLAN: 
   
Principal Problem: 
  Left hip pain (10/17/2018) Active Problems: 
  Fall (10/17/2018) Inability to walk (10/17/2018) Plan: D/W Dr Oliver Urias. CT and plain XR neg, unable to get MRI d/t pacemaker. We will order bone scan to R/O occult hip fracture. Left knee OA 
PT/OT: no amb until further imaging.  
 
Artemio Fabry, PA

## 2018-10-20 LAB
GLUCOSE BLD STRIP.AUTO-MCNC: 109 MG/DL (ref 65–100)
GLUCOSE BLD STRIP.AUTO-MCNC: 96 MG/DL (ref 65–100)
GLUCOSE BLD STRIP.AUTO-MCNC: 96 MG/DL (ref 65–100)
GLUCOSE BLD STRIP.AUTO-MCNC: 99 MG/DL (ref 65–100)
SERVICE CMNT-IMP: ABNORMAL
SERVICE CMNT-IMP: NORMAL

## 2018-10-20 PROCEDURE — 82962 GLUCOSE BLOOD TEST: CPT

## 2018-10-20 PROCEDURE — 65270000029 HC RM PRIVATE

## 2018-10-20 PROCEDURE — 74011250636 HC RX REV CODE- 250/636: Performed by: INTERNAL MEDICINE

## 2018-10-20 PROCEDURE — 74011250637 HC RX REV CODE- 250/637: Performed by: INTERNAL MEDICINE

## 2018-10-20 RX ORDER — OXYCODONE AND ACETAMINOPHEN 5; 325 MG/1; MG/1
1 TABLET ORAL
Qty: 12 TAB | Refills: 0 | Status: SHIPPED | OUTPATIENT
Start: 2018-10-20 | End: 2019-06-02

## 2018-10-20 RX ADMIN — ENOXAPARIN SODIUM 40 MG: 40 INJECTION SUBCUTANEOUS at 08:59

## 2018-10-20 RX ADMIN — PRAVASTATIN SODIUM 80 MG: 40 TABLET ORAL at 21:16

## 2018-10-20 RX ADMIN — OXYCODONE AND ACETAMINOPHEN 1 TABLET: 5; 325 TABLET ORAL at 14:49

## 2018-10-20 RX ADMIN — LOSARTAN POTASSIUM 50 MG: 50 TABLET, FILM COATED ORAL at 17:19

## 2018-10-20 RX ADMIN — Medication 10 ML: at 21:16

## 2018-10-20 RX ADMIN — Medication 10 ML: at 07:02

## 2018-10-20 RX ADMIN — OXYCODONE AND ACETAMINOPHEN 1 TABLET: 5; 325 TABLET ORAL at 21:16

## 2018-10-20 RX ADMIN — METHIMAZOLE 7.5 MG: 5 TABLET ORAL at 08:59

## 2018-10-20 RX ADMIN — AMIODARONE HYDROCHLORIDE 200 MG: 200 TABLET ORAL at 09:00

## 2018-10-20 RX ADMIN — LOSARTAN POTASSIUM 50 MG: 50 TABLET, FILM COATED ORAL at 09:00

## 2018-10-20 RX ADMIN — Medication 10 ML: at 14:46

## 2018-10-20 RX ADMIN — OXYCODONE AND ACETAMINOPHEN 1 TABLET: 5; 325 TABLET ORAL at 09:00

## 2018-10-20 RX ADMIN — OXYBUTYNIN CHLORIDE 10 MG: 5 TABLET ORAL at 08:59

## 2018-10-20 NOTE — PROGRESS NOTES
Bedside and Verbal shift change report given to [address] [place] [address] (oncoming nurse) by Zachariah Long (offgoing nurse). Report included the following information SBAR, Kardex, Intake/Output, MAR, Recent Results and Med Rec Status.

## 2018-10-20 NOTE — DISCHARGE SUMMARY
Discharge Summary       PATIENT ID: Erika Pat  MRN: [id number]   YOB: 1940    DATE OF ADMISSION: 10/17/2018  5:08 AM    DATE OF DISCHARGE: 10/20/2018   PRIMARY CARE PROVIDER: Karla Aquino MD     ATTENDING PHYSICIAN: Veronica Taylor MD  DISCHARGING PROVIDER: Veronica Taylor MD    To contact this individual call [phone] and ask the  to page. If unavailable ask to be transferred the Adult Hospitalist Department. CONSULTATIONS: IP CONSULT TO HOSPITALIST  IP CONSULT TO ORTHOPEDIC SURGERY    PROCEDURES/SURGERIES: * No surgery found *    [address] COURSE:   74F PMH bladder ca, Afib-s/p ppm, CVA, DM, Dementia [facility], left hip severe pain, evaluated by Orthopedics, couldn't have MRI, had [place] bone scan which was -ve for fractures. DISCHARGE DIAGNOSES / PLAN:      #. Fall/Ambulatory dysfunction  - uses walker at baseline,- [facility]  - CT LLE: No fracture. Mild left hip osteoarthritis. Labral chondrocalcinosis  - Ortho consulted: Suspect her recent fall has exacerbated symptoms of underlying degenerative changes. Can't have MRI in [facility] due to having PPM, had bone scan which was negative for acute fractures. Plan for dc to SNF and continued PT/OT.     Hx stage II bladder cancer s/p TURBT, chemo and XRT  - follows with [facility] oncology- Hx urinary incontinence: c/w oxybutnin     Afib - darius s/p pacemaker- rate controlled on amiodarone  CVA - hemorrhagic  DM - ISS, A1c ordered  HTN- c/w losartan. amlodipine on hold  Hyperthyroidism- TSH 6.5. C/w methimazole       FOLLOW UP APPOINTMENTS:    Follow-up Information    None          ADDITIONAL CARE RECOMMENDATIONS: SNF dc and PT/OT follow up.     DIET: Resume previous diet    ACTIVITY: Activity as tolerated and PT/OT Eval and Treat    DISCHARGE MEDICATIONS:  Current Discharge Medication List          NOTIFY YOUR PHYSICIAN FOR ANY OF THE FOLLOWING:   Fever over 101 degrees for 24 hours. Chest pain, shortness of breath, fever, chills, nausea, vomiting, diarrhea, change in mentation, falling, weakness, bleeding. Severe pain or pain not relieved by medications. Or, any other signs or symptoms that you may have questions about. DISPOSITION:    Home With:   OT  PT  HH  RN      x Long term SNF/Inpatient Rehab    Independent/assisted living    Hospice    Other:       PATIENT CONDITION AT DISCHARGE:     Functional status    Poor     Deconditioned     Independent      Cognition     Lucid     Forgetful     Dementia      Catheters/lines (plus indication)    Schmidt     PICC     PEG     None      Code status     Full code     DNR      PHYSICAL EXAMINATION AT DISCHARGE:   General:  Alert, oriented, No acute distress  HEENT:  Pink conjunctivae, hearing intact to voice, moist mucous membranes  Card:  S1, S2 without murmurs, good peripheral perfusion, No peripheral edema  Resp:  No accessory muscle use, clear breath sounds without wheezes or rhonchi  Abd:  Soft, non-tender, non-distended, BS+, no mass  Extremities:  No cyanosis or clubbing, no significant edema, tender on left thigh  Psych:  Good insight, AAOx3, not agitated. CHRONIC MEDICAL DIAGNOSES:  Problem List as of 10/20/2018 Date Reviewed: 10/16/2018          Codes Class Noted - Resolved    Fall ICD-10-CM: W19. Zenaida Wilkerson  ICD-9-CM: E888.9  10/17/2018 - Present        * (Principal) Left hip pain ICD-10-CM: M25.552  ICD-9-CM: 719.45  10/17/2018 - Present        Inability to walk ICD-10-CM: R26.2  ICD-9-CM: 719.7  10/17/2018 - Present        Acute deep vein thrombosis (DVT) of proximal vein of both lower extremities (HCC) ICD-10-CM: I82.4Y3  ICD-9-CM: 453.41  10/20/2017 - Present        Acute CVA (cerebrovascular accident) [facility]) ICD-10-CM: I63.9  ICD-9-CM: 434.91  11/16/2016 - Present    Overview Signed 11/16/2016 12:11 PM by Jaclyn Johnson     - unable to perform MRI, but clinically had CVA             Bladder cancer [facility]) ICD-10-CM: C67.9  ICD-9-CM: 188.9  Unknown - Present        Left-sided weakness ICD-10-CM: R53.1  ICD-9-CM: 728.87  11/15/2016 - Present        Dysarthria ICD-10-CM: R47.1  ICD-9-CM: 784.51  11/15/2016 - Present        Sick sinus syndrome ([facility] ICD-10-CM: I49.5  ICD-9-CM: 427.81  10/25/2016 - Present        Pacemaker ICD-10-CM: Z95.0  ICD-9-CM: V45.01  10/25/2016 - Present        Multinodular goiter ICD-10-CM: E04.2  ICD-9-CM: 241.1  1/29/2013 - Present        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 ICD-10-CM: E05.80  ICD-9-CM: 242.80  1/16/2013 - Present        HTN (hypertension) ICD-10-CM: I10  ICD-9-CM: 401.9  Unknown - Present        Hyperlipidemia ICD-10-CM: E78.5  ICD-9-CM: 272.4  Unknown - Present        Atrial fibrillation ([facility] ICD-10-CM: I48.91  ICD-9-CM: 427.31  10/6/2010 - Present        Hypertension ICD-10-CM: I10  ICD-9-CM: 401.9  10/6/2010 - Present        Diabetes ([facility] ICD-10-CM: E11.9  ICD-9-CM: 250.00  10/6/2010 - Present        RESOLVED: Atrial fibrillation ([facility] ICD-10-CM: I48.91  ICD-9-CM: 427.31  Unknown - 10/20/2017        RESOLVED: DM (diabetes mellitus) ([facility] ICD-10-CM: E11.9  ICD-9-CM: 250.00  Unknown - 10/20/2017              Greater than 30 minutes were spent with the patient on counseling and coordination of care    Signed:   Fay Tripp MD  10/20/2018  12:37 PM

## 2018-10-20 NOTE — PROGRESS NOTES
Received a consult that Mrs. Lola Diaz had been discharged. A PT eval was requested as it look like she had not been out of bed for sometime. Nursing was going to attempt to get PT to see her. Mari Goodell was called. Yuania Jason was told that Mrs. Lola Diaz had been discharged today. She needed to talk to the Director of Nursing to see if they can accept her. She will have the Director of Nursing call me. Mrs. Abida Bee, daughter, was called. She was informed that her mother had been discharged. She disagreed with discharged. She was informed that she had the right to appeal the discharge. She was given the phone number for [facility]. She requested the physician to give her a call. Dr. Alexey Monet was called and informed. He was also informed that I did not have a facility that had accepted her. She had only a one night stay inpatient so far. He stated that he had been misinformed that Mrs. Lola Diaz did have an accepting facility. He was going to cancel the discharge. Mrs. Abida Bee called back. She stated that she had appealed. She was informed that discharge had been cancelled. Regerrals were sent to the 3 skilled nursing facilities. Will continue to follow for discharge planning. Signed By: Amy Lee LCSW October 20, 2018

## 2018-10-20 NOTE — PROGRESS NOTES
Bedside shift change report given to Joe Valentino RN (oncoming nurse) by Wendy Grey (offgoing nurse). Report included the following information SBAR and MAR.

## 2018-10-20 NOTE — PROGRESS NOTES
Bedside and Verbal shift change report given to [address] [place] [address] (oncoming nurse) by Tico Fowler (offgoing nurse). Report included the following information SBAR, Kardex, Intake/Output, MAR, Recent Results and Med Rec Status.

## 2018-10-20 NOTE — PROGRESS NOTES
Ortho: 
Still c/o left thigh pain. Baseline amb walker. NAD. Answers questions appropriately. Moves right LE well. Left foot NVI. Knee, thigh no TTP. No swelling/bruising. No pain left hip with log roll hip. Bone scan no fracture. Imp: 
Exacerbation left hip/knee DJD Much improved this am. 
 
Plan: 1. PT/OT amb WBAT left LE with walker. 2. Disp planning.  
 
Dr. Seun Orta agrees with plan, 
 
ROBERTO Juárez

## 2018-10-21 LAB
GLUCOSE BLD STRIP.AUTO-MCNC: 101 MG/DL (ref 65–100)
GLUCOSE BLD STRIP.AUTO-MCNC: 105 MG/DL (ref 65–100)
GLUCOSE BLD STRIP.AUTO-MCNC: 126 MG/DL (ref 65–100)
GLUCOSE BLD STRIP.AUTO-MCNC: 89 MG/DL (ref 65–100)
SERVICE CMNT-IMP: ABNORMAL
SERVICE CMNT-IMP: NORMAL

## 2018-10-21 PROCEDURE — 82962 GLUCOSE BLOOD TEST: CPT

## 2018-10-21 PROCEDURE — 74011250637 HC RX REV CODE- 250/637: Performed by: INTERNAL MEDICINE

## 2018-10-21 PROCEDURE — 65270000029 HC RM PRIVATE

## 2018-10-21 PROCEDURE — 74011250636 HC RX REV CODE- 250/636: Performed by: INTERNAL MEDICINE

## 2018-10-21 PROCEDURE — 74011000250 HC RX REV CODE- 250: Performed by: INTERNAL MEDICINE

## 2018-10-21 RX ORDER — POLYETHYLENE GLYCOL 3350 17 G/17G
17 POWDER, FOR SOLUTION ORAL
Status: DISCONTINUED | OUTPATIENT
Start: 2018-10-21 | End: 2018-10-23

## 2018-10-21 RX ORDER — AMOXICILLIN 250 MG
1 CAPSULE ORAL DAILY
Status: DISCONTINUED | OUTPATIENT
Start: 2018-10-21 | End: 2018-10-23 | Stop reason: HOSPADM

## 2018-10-21 RX ORDER — GLYCERIN ADULT
1 SUPPOSITORY, RECTAL RECTAL
Status: DISCONTINUED | OUTPATIENT
Start: 2018-10-21 | End: 2018-10-23 | Stop reason: HOSPADM

## 2018-10-21 RX ADMIN — AMIODARONE HYDROCHLORIDE 200 MG: 200 TABLET ORAL at 09:45

## 2018-10-21 RX ADMIN — LOSARTAN POTASSIUM 50 MG: 50 TABLET, FILM COATED ORAL at 17:46

## 2018-10-21 RX ADMIN — OXYCODONE AND ACETAMINOPHEN 1 TABLET: 5; 325 TABLET ORAL at 17:53

## 2018-10-21 RX ADMIN — LOSARTAN POTASSIUM 50 MG: 50 TABLET, FILM COATED ORAL at 09:45

## 2018-10-21 RX ADMIN — METHIMAZOLE 7.5 MG: 5 TABLET ORAL at 09:45

## 2018-10-21 RX ADMIN — POLYETHYLENE GLYCOL 3350 17 G: 17 POWDER, FOR SOLUTION ORAL at 21:13

## 2018-10-21 RX ADMIN — Medication 10 ML: at 21:14

## 2018-10-21 RX ADMIN — OXYBUTYNIN CHLORIDE 10 MG: 5 TABLET ORAL at 09:49

## 2018-10-21 RX ADMIN — ENOXAPARIN SODIUM 40 MG: 40 INJECTION SUBCUTANEOUS at 09:45

## 2018-10-21 RX ADMIN — Medication 10 ML: at 06:24

## 2018-10-21 RX ADMIN — PRAVASTATIN SODIUM 80 MG: 40 TABLET ORAL at 21:13

## 2018-10-21 RX ADMIN — SENNOSIDES AND DOCUSATE SODIUM 1 TABLET: 8.6; 5 TABLET ORAL at 09:45

## 2018-10-21 RX ADMIN — OXYCODONE AND ACETAMINOPHEN 1 TABLET: 5; 325 TABLET ORAL at 21:13

## 2018-10-21 RX ADMIN — OXYCODONE AND ACETAMINOPHEN 1 TABLET: 5; 325 TABLET ORAL at 06:24

## 2018-10-21 RX ADMIN — Medication 10 ML: at 14:11

## 2018-10-21 RX ADMIN — OXYCODONE AND ACETAMINOPHEN 1 TABLET: 5; 325 TABLET ORAL at 12:01

## 2018-10-21 NOTE — PROGRESS NOTES
Hospitalist Progress Note Jluis Menard MD 
Answering service: [phone] OR [phone] from in house phone Date of Service:  10/21/2018 NAME:  China Olson :  1940 MRN:  [id number] Admission Summary:  
74F PMH bladder ca, Afib-s/p ppm, CVA, DM, Dementia [facility] after a [facility], left hip severe pain, evaluated by ortho who planned for MRI Interval history / Subjective:  
Patient seen and examined at bedside, feels ok, still c/o pain in left thigh area, asking about dc, medically ready to dc to SNF, I was told yesterday by RN SNF was available, later I am told by case Mg there was no SNF as she wouldn't be accepted on Medicare having been IP less than 3days, and family not paying. Assessment & Plan:  
 
Fall/Ambulatory dysfunction 
- uses walker 
- admitted from [facility] 
- CT LLE: No fracture. Mild left hip osteoarthritis. Labral chondrocalcinosis - Ortho consulted: Suspect her recent fall has exacerbated symptoms of underlying degenerative changes. - Can't have MRI in [facility] due to having PPM, got bone scan that ruled out fracures, medically ready for dc to SNF per Orthopedics. 
- PT/OT following,  
  
Hx stage II bladder cancer s/p TURBT, chemo and XRT 
- follows with [facility] oncology - Hx urinary incontinence: c/w oxybutnin Constipation: laxative prn Afib - darius s/p pacemaker- rate controlled on amiodarone Hx bilateral DVT 
CVA - hemorrhagic DM - ISS, A1c ordered HLD - statin HTN- c/w losartan. amlodipine on hold Hyperthyroidism- TSH 6.5. C/w methimazole Code status: Full DVT prophylaxis: Lovenox Care Plan discussed with: Patient/Family and Nurse Disposition: SNF/LTC and TBD Hospital Problems  Date Reviewed: 10/16/2018 Codes Class Noted POA Fall ICD-10-CM: W19. Beau Oakley ICD-9-CM: L482.8  10/17/2018 Unknown * (Principal) Left hip pain ICD-10-CM: M25.552 ICD-9-CM: 719.45  10/17/2018 Unknown Inability to walk ICD-10-CM: R26.2 ICD-9-CM: 719.7  10/17/2018 Unknown Review of Systems:  
Pertinent items are mentioned in interval history. Vital Signs:  
 Last 24hrs VS reviewed since prior progress note. Most recent are: 
Visit Vitals /61 (BP 1 Location: Right arm, BP Patient Position: At rest) Pulse 60 Temp 98.3 °F (36.8 °C) Resp 18 Ht 5' 5\" (1.651 m) Wt 71.3 kg (157 lb 3 oz) SpO2 96% BMI 26.16 kg/m² Intake/Output Summary (Last 24 hours) at 10/21/2018 8182 Last data filed at 10/20/2018 8472 Gross per 24 hour Intake 480 ml Output  Net 480 ml Physical Examination:  
 
General:  Alert, oriented, No acute distress Card:  S1, S2 without murmurs, good peripheral perfusion, No peripheral edema Resp:  No accessory muscle use, clear breath sounds without wheezes or rhonchi Abd:  Soft, non-tender, non-distended, BS+, no mass Extremities:  No cyanosis or clubbing, no significant edema, tender on left thigh/limited movement Psych:  Good insight, AAOx3, not agitated. Data Review:  
 Review and/or order of clinical lab test 
Review and/or order of tests in the radiology section of CPT Labs:  
 
No results for input(s): WBC, HGB, HCT, PLT, HGBEXT, HCTEXT, PLTEXT, HGBEXT, HCTEXT, PLTEXT in the last 72 hours. No results for input(s): NA, K, CL, CO2, BUN, CREA, GLU, CA, MG, PHOS, URICA in the last 72 hours. No results for input(s): SGOT, GPT, ALT, AP, TBIL, TBILI, TP, ALB, GLOB, GGT, AML, LPSE in the last 72 hours. No lab exists for component: AMYP, HLPSE No results for input(s): INR, PTP, APTT in the last 72 hours. No lab exists for component: INREXT, INREXT No results for input(s): FE, TIBC, PSAT, FERR in the last 72 hours. No results found for: FOL, RBCF No results for input(s): PH, PCO2, PO2 in the last 72 hours. No results for input(s): CPK, CKNDX, TROIQ in the last 72 hours. No lab exists for component: CPKMB Lab Results Component Value Date/Time Cholesterol, total 167 01/18/2016 12:00 AM  
 HDL Cholesterol 80 01/18/2016 12:00 AM  
 LDL,Direct 86 11/16/2016 04:35 AM  
 LDL, calculated 76 01/18/2016 12:00 AM  
 Triglyceride 54 01/18/2016 12:00 AM  
 
Lab Results Component Value Date/Time Glucose (POC) 89 10/21/2018 06:13 AM  
 Glucose (POC) 109 (H) 10/20/2018 09:23 PM  
 Glucose (POC) 96 10/20/2018 04:02 PM  
 Glucose (POC) 96 10/20/2018 11:47 AM  
 Glucose (POC) 99 10/20/2018 07:07 AM  
 
Lab Results Component Value Date/Time Color YELLOW/STRAW 10/17/2018 05:30 AM  
 Appearance CLEAR 10/17/2018 05:30 AM  
 Specific gravity 1.015 10/17/2018 05:30 AM  
 pH (UA) 6.0 10/17/2018 05:30 AM  
 Protein NEGATIVE  10/17/2018 05:30 AM  
 Glucose NEGATIVE  10/17/2018 05:30 AM  
 Ketone NEGATIVE  10/17/2018 05:30 AM  
 Bilirubin NEGATIVE  10/17/2018 05:30 AM  
 Urobilinogen 0.2 10/17/2018 05:30 AM  
 Nitrites POSITIVE (A) 10/17/2018 05:30 AM  
 Leukocyte Esterase TRACE (A) 10/17/2018 05:30 AM  
 Epithelial cells FEW 10/17/2018 05:30 AM  
 Bacteria NEGATIVE  10/17/2018 05:30 AM  
 WBC 0-4 10/17/2018 05:30 AM  
 RBC 0-5 10/17/2018 05:30 AM  
 
 
Medications Reviewed:  
 
Current Facility-Administered Medications Medication Dose Route Frequency  enoxaparin (LOVENOX) injection 40 mg  40 mg SubCUTAneous DAILY  sodium chloride (NS) flush 10 mL  10 mL InterCATHeter PRN  
 sodium chloride (NS) flush 10-40 mL  10-40 mL InterCATHeter Q8H  
 heparin (porcine) pf 300-500 Units  300-500 Units InterCATHeter PRN  
 alteplase (CATHFLO) 1 mg in sterile water (preservative free) 1 mL injection  1 mg InterCATHeter PRN  
 sodium chloride (NS) flush 10-30 mL  10-30 mL InterCATHeter PRN  
 methIMAzole (TAPAZOLE) tablet 7.5 mg  7.5 mg Oral DAILY  losartan (COZAAR) tablet 50 mg  50 mg Oral BID  acetaminophen (TYLENOL) tablet 650 mg  650 mg Oral Q6H PRN  
  insulin regular (NOVOLIN R, HUMULIN R) injection   SubCUTAneous AC&HS  
 glucose chewable tablet 16 g  4 Tab Oral PRN  
 dextrose (D50W) injection syrg 12.5-25 g  12.5-25 g IntraVENous PRN  
 glucagon (GLUCAGEN) injection 1 mg  1 mg IntraMUSCular PRN  
 amiodarone (CORDARONE) tablet 200 mg  200 mg Oral DAILY  oxybutynin (DITROPAN) tablet 10 mg  10 mg Oral DAILY  pravastatin (PRAVACHOL) tablet 80 mg  80 mg Oral QHS  oxyCODONE-acetaminophen (PERCOCET) 5-325 mg per tablet 1 Tab  1 Tab Oral Q4H PRN  
 
______________________________________________________________________ EXPECTED LENGTH OF STAY: - - - 
ACTUAL LENGTH OF STAY:          2 Ladonna Jolly MD

## 2018-10-21 NOTE — PROGRESS NOTES
Ortho: 
Patient describes less pain. Wants to mobilize. NAD. Moves right leg well. Left hip no erythema, ecchymosis, wound. No swelling. No TTP. Moves left ankle/knee/hip better. bilat calf NT. Imp: 
Exacerbation left hip/knee DJD s/p fall. Plan: Mobilize with PT. WBAT left LE. Disp planning. Re-consult ortho if concerns.  
 
ROBERTO Juárez

## 2018-10-21 NOTE — PROGRESS NOTES
Bedside and Verbal shift change report given to [address] [place] [address] (oncoming nurse) by Katelyn Perez (offgoing nurse). Report included the following information SBAR, Kardex, Intake/Output, MAR, Recent Results and Med Rec Status.

## 2018-10-22 LAB
ANION GAP SERPL CALC-SCNC: 8 MMOL/L (ref 5–15)
BUN SERPL-MCNC: 21 MG/DL (ref 6–20)
BUN/CREAT SERPL: 24 (ref 12–20)
CALCIUM SERPL-MCNC: 8.4 MG/DL (ref 8.5–10.1)
CHLORIDE SERPL-SCNC: 106 MMOL/L (ref 97–108)
CO2 SERPL-SCNC: 27 MMOL/L (ref 21–32)
CREAT SERPL-MCNC: 0.87 MG/DL (ref 0.55–1.02)
ERYTHROCYTE [DISTWIDTH] IN BLOOD BY AUTOMATED COUNT: 15 % (ref 11.5–14.5)
GLUCOSE BLD STRIP.AUTO-MCNC: 103 MG/DL (ref 65–100)
GLUCOSE BLD STRIP.AUTO-MCNC: 107 MG/DL (ref 65–100)
GLUCOSE BLD STRIP.AUTO-MCNC: 146 MG/DL (ref 65–100)
GLUCOSE BLD STRIP.AUTO-MCNC: 94 MG/DL (ref 65–100)
GLUCOSE SERPL-MCNC: 101 MG/DL (ref 65–100)
HCT VFR BLD AUTO: 33.8 % (ref 35–47)
HGB BLD-MCNC: 10.6 G/DL (ref 11.5–16)
MCH RBC QN AUTO: 28.2 PG (ref 26–34)
MCHC RBC AUTO-ENTMCNC: 31.4 G/DL (ref 30–36.5)
MCV RBC AUTO: 89.9 FL (ref 80–99)
NRBC # BLD: 0 K/UL (ref 0–0.01)
NRBC BLD-RTO: 0 PER 100 WBC
PLATELET # BLD AUTO: 151 K/UL (ref 150–400)
PMV BLD AUTO: 12.1 FL (ref 8.9–12.9)
POTASSIUM SERPL-SCNC: 4 MMOL/L (ref 3.5–5.1)
RBC # BLD AUTO: 3.76 M/UL (ref 3.8–5.2)
SERVICE CMNT-IMP: ABNORMAL
SERVICE CMNT-IMP: NORMAL
SODIUM SERPL-SCNC: 141 MMOL/L (ref 136–145)
WBC # BLD AUTO: 3.6 K/UL (ref 3.6–11)

## 2018-10-22 PROCEDURE — 74011636637 HC RX REV CODE- 636/637: Performed by: INTERNAL MEDICINE

## 2018-10-22 PROCEDURE — 74011250637 HC RX REV CODE- 250/637: Performed by: INTERNAL MEDICINE

## 2018-10-22 PROCEDURE — 36591 DRAW BLOOD OFF VENOUS DEVICE: CPT

## 2018-10-22 PROCEDURE — 65270000029 HC RM PRIVATE

## 2018-10-22 PROCEDURE — 65270000032 HC RM SEMIPRIVATE

## 2018-10-22 PROCEDURE — 36415 COLL VENOUS BLD VENIPUNCTURE: CPT | Performed by: INTERNAL MEDICINE

## 2018-10-22 PROCEDURE — 97530 THERAPEUTIC ACTIVITIES: CPT

## 2018-10-22 PROCEDURE — 97110 THERAPEUTIC EXERCISES: CPT

## 2018-10-22 PROCEDURE — 80048 BASIC METABOLIC PNL TOTAL CA: CPT | Performed by: INTERNAL MEDICINE

## 2018-10-22 PROCEDURE — 85027 COMPLETE CBC AUTOMATED: CPT | Performed by: INTERNAL MEDICINE

## 2018-10-22 PROCEDURE — 82962 GLUCOSE BLOOD TEST: CPT

## 2018-10-22 PROCEDURE — 74011250636 HC RX REV CODE- 250/636: Performed by: INTERNAL MEDICINE

## 2018-10-22 PROCEDURE — 97535 SELF CARE MNGMENT TRAINING: CPT

## 2018-10-22 RX ADMIN — PRAVASTATIN SODIUM 80 MG: 40 TABLET ORAL at 21:43

## 2018-10-22 RX ADMIN — Medication 10 ML: at 14:39

## 2018-10-22 RX ADMIN — OXYCODONE AND ACETAMINOPHEN 1 TABLET: 5; 325 TABLET ORAL at 18:47

## 2018-10-22 RX ADMIN — LOSARTAN POTASSIUM 50 MG: 50 TABLET, FILM COATED ORAL at 08:56

## 2018-10-22 RX ADMIN — SENNOSIDES AND DOCUSATE SODIUM 1 TABLET: 8.6; 5 TABLET ORAL at 08:56

## 2018-10-22 RX ADMIN — Medication 10 ML: at 21:44

## 2018-10-22 RX ADMIN — Medication 10 ML: at 06:00

## 2018-10-22 RX ADMIN — OXYCODONE AND ACETAMINOPHEN 1 TABLET: 5; 325 TABLET ORAL at 04:53

## 2018-10-22 RX ADMIN — AMIODARONE HYDROCHLORIDE 200 MG: 200 TABLET ORAL at 08:56

## 2018-10-22 RX ADMIN — LOSARTAN POTASSIUM 50 MG: 50 TABLET, FILM COATED ORAL at 17:39

## 2018-10-22 RX ADMIN — HUMAN INSULIN 2 UNITS: 100 INJECTION, SOLUTION SUBCUTANEOUS at 12:21

## 2018-10-22 RX ADMIN — OXYBUTYNIN CHLORIDE 10 MG: 5 TABLET ORAL at 08:56

## 2018-10-22 RX ADMIN — ENOXAPARIN SODIUM 40 MG: 40 INJECTION SUBCUTANEOUS at 08:55

## 2018-10-22 RX ADMIN — METHIMAZOLE 7.5 MG: 5 TABLET ORAL at 08:56

## 2018-10-22 NOTE — PROGRESS NOTES
Problem: Self Care Deficits Care Plan (Adult) Goal: *Acute Goals and Plan of Care (Insert Text) Occupational Therapy Goals Initiated 10/18/2018 1. Patient will perform grooming and UE ADL's seated edge of bed with supervision/set-up within 7 day(s). 2.  Patient will perform lower body dressing with moderate assistance  within 7 day(s). 3.  Patient will perform toilet transfer with minimal assistance/contact guard assist within 7 day(s). 4.  Patient will perform all aspects of toileting with minimal assistance/contact guard assist within 7 day(s). 5.  Patient will participate in upper extremity therapeutic exercise/activities with supervision/set-up for 10 minutes within 7 day(s). Occupational Therapy TREATMENT Patient: Palmer Appiah (26 y.o. female) Date: 10/22/2018 Diagnosis: Inability to walk Fall Left hip pain Fall Left hip pain Left hip pain Precautions: Fall Chart, occupational therapy assessment, plan of care, and goals were reviewed. ASSESSMENT: 
Patient received seated in recliner, participated in PT session this AM, requiring Max-Total A with RW for SPT to bedside chair. Patient continues with disorientation x1-2, STM loss and minimally repetitive this session. Participating in full body bathing and dressing, overall supervision/setup-Total A for task. Remains limited by global weakness, fear of falling, and abdominal/L knee pain with mobility. Will require SNF rehab when medically stable. Progression toward goals: 
[]       Improving appropriately and progressing toward goals [x]       Improving slowly and progressing toward goals 
[]       Not making progress toward goals and plan of care will be adjusted PLAN: 
Patient continues to benefit from skilled intervention to address the above impairments. Continue treatment per established plan of care. Discharge Recommendations:  Arnel Lundberg Further Equipment Recommendations for Discharge:  TBD by rehab SUBJECTIVE:  
Patient stated Which rehab place do they want me to go to? OBJECTIVE DATA SUMMARY:  
Cognitive/Behavioral Status: 
Neurologic State: Alert;Confused Orientation Level: Oriented to person;Oriented to place;Oriented to situation Cognition: Follows commands; Appropriate for age attention/concentration;Memory loss Perception: Cues to maintain midline in standing Perseveration: No perseveration noted Safety/Judgement: Fall prevention Functional Mobility and Transfers for ADLs:Bed Mobility: 
Supine to Sit: Maximum assistance Scooting: Minimum assistance Transfers: 
Sit to Stand: Assist x1;Maximum assistance Bed to Chair: Maximum assistance Balance: 
Sitting: Impaired; Without support Sitting - Static: Good (unsupported) Sitting - Dynamic: Fair (occasional) Standing: Impaired; With support Standing - Static: Poor;Constant support Standing - Dynamic : Poor ADL Intervention: 
Grooming Washing Face: Supervision/set-up Brushing Teeth: Supervision/set-up Upper Body Bathing Bathing Assistance: Supervision/set-up Position Performed: Seated in chair Lower Body Bathing Perineal  : Total assistance (dependent) Position Performed: Supine(Completed this AM) Lower Body : Total assistance (dependent) Position Performed: Seated in chair Upper Body Dressing Assistance Hospital Gown: Minimum  assistance Cues: Physical assistance Lower Body Dressing Assistance Socks: Total assistance (dependent) Leg Crossed Method Used: No 
Position Performed: Seated in chair Cognitive Retraining Safety/Judgement: Fall prevention Pain: 
Pain Scale 1: Numeric (0 - 10) Pain Intensity 1: 0 Activity Tolerance:  
Good. Please refer to the flowsheet for vital signs taken during this treatment. After treatment:  
[x] Patient left in no apparent distress sitting up in chair 
[] Patient left in no apparent distress in bed 
[x] Call bell left within reach [x] Nursing notified [] Caregiver present [x] Chair alarm activated COMMUNICATION/COLLABORATION:  
The patients plan of care was discussed with: Physical Therapist and Registered Nurse Jesus Hu OT Time Calculation: 23 mins

## 2018-10-22 NOTE — PROGRESS NOTES
Spiritual Care Partner Volunteer visited patient in [address] on 10/22/2018. Documented by: 
Chaplain Thornton MDiv, MS, [address] ([id number])

## 2018-10-22 NOTE — CDMP QUERY
Currently there is documentation of AFIB on this chart. Could this be further specified as: 
 
=> Persistent AFIB (POA) in the setting of known history requiring continued home meds. => Chronic AFIB (POA) in the setting of known history requiring continued home meds. => Paroxysmal AFIB (POA) in the setting of known history requiring continued home meds. => Permanent AFIB (POA) in the setting of known history requiring continued home meds. The medical record reflects the following clinical findings, treatment, and risk factors: 
 
Risk Factors: hx afib Clinical Indicators: hx afib Treatment: PO Amio Please clarify and document your clinical opinion in the progress notes and discharge summary including the definitive and/or presumptive diagnosis, (suspected or probable), related to the above clinical findings. Please include clinical findings supporting your diagnosis. 
 
------------------------------------------------------------------------------------------------- 
Paroxysmal:  begins suddenly and stops on its own. Symptoms can be mild or severe. They stop within about a week, but usually in less than 24 hours. Persistent: continues for more than a week. It may stop on its own, or it can be stopped with treatment. Permanent: cannot be restored with treatment Thank you, Katherine Diaz, RN 
[phone]

## 2018-10-22 NOTE — PROGRESS NOTES
Referral sent to [address] [place] [address]. Patient approved for admission. Patient status was upgraded from OBS to inpt on 10/19/18. She has has her 3 night qualifier under inpt status. With approval for admission to [facility], she can be d/c'd on Tuesday.   Staci Blackwell, SILVANO, CRM

## 2018-10-22 NOTE — PROGRESS NOTES
Hospitalist Progress Note Binh Yap MD 
Answering service: [phone] OR [phone] from in house phone Date of Service:  10/22/2018 NAME:  Nestor Cherry :  1940 MRN:  [id number] Admission Summary:  
74F PMH bladder ca, Afib-s/p ppm, CVA, DM, Dementia [facility] after a [facility], left hip severe pain, evaluated by ortho who planned for MRI Interval history / Subjective:  
Patient seen and examined at bedside, feels ok, still c/o pain in left thigh area, looks better, sitting on side of bed Assessment & Plan:  
 
Fall/Ambulatory dysfunction 
- uses walker 
- admitted from [facility] 
- CT LLE: No fracture. Mild left hip osteoarthritis. Labral chondrocalcinosis - Ortho consulted: Suspect her recent fall has exacerbated symptoms of underlying degenerative changes. - Can't have MRI in [facility] due to PPM, got bone scan that ruled out fracures, medically ready for dc to SNF per Orthopedics. 
- PT/OT following,  
  
Hx stage II bladder cancer s/p TURBT, chemo and XRT 
- follows with [facility] oncology - Hx urinary incontinence: c/w oxybutnin Constipation: laxative prn Afib - darius s/p pacemaker- rate controlled on amiodarone Hx bilateral DVT 
CVA - hemorrhagic DM - ISS, A1c ordered HLD - statin HTN- c/w losartan. amlodipine on hold Hyperthyroidism- TSH 6.5. C/w methimazole Code status: Full DVT prophylaxis: Lovenox Care Plan discussed with: Patient/Family and Nurse Disposition: SNF ? tomorrow Hospital Problems  Date Reviewed: 10/16/2018 Codes Class Noted POA Fall ICD-10-CM: W19. Zak Anthonyter ICD-9-CM: Z696.4  10/17/2018 Unknown * (Principal) Left hip pain ICD-10-CM: M25.552 ICD-9-CM: 719.45  10/17/2018 Unknown Inability to walk ICD-10-CM: R26.2 ICD-9-CM: 719.7  10/17/2018 Unknown Review of Systems:  
Pertinent items are mentioned in interval history. Vital Signs: Last 24hrs VS reviewed since prior progress note. Most recent are: 
Visit Vitals /56 (BP 1 Location: Left arm, BP Patient Position: At rest) Pulse 63 Temp 98.3 °F (36.8 °C) Resp 18 Ht 5' 5\" (1.651 m) Wt 71.3 kg (157 lb 3 oz) SpO2 96% BMI 26.16 kg/m² Intake/Output Summary (Last 24 hours) at 10/22/2018 1244 Last data filed at 10/22/2018 9126 Gross per 24 hour Intake 320 ml Output  Net 320 ml Physical Examination:  
 
General:  Alert, oriented, No acute distress Card:  S1, S2 without murmurs, good peripheral perfusion, No peripheral edema Resp:  No accessory muscle use, clear breath sounds without wheezes or rhonchi Abd:  Soft, non-tender, non-distended, BS+, no mass Extremities:  No cyanosis or clubbing, no significant edema, tender on left thigh/limited movement Psych:  Good insight, AAOx3, not agitated. Data Review:  
 Review and/or order of clinical lab test 
Review and/or order of tests in the radiology section of CPT Labs:  
 
Recent Labs 10/22/18 
7827 WBC 3.6 HGB 10.6* HCT 33.8*  
 Recent Labs 10/22/18 
2059   
K 4.0  
 CO2 27 BUN 21* CREA 0.87 * CA 8.4* No results for input(s): SGOT, GPT, ALT, AP, TBIL, TBILI, TP, ALB, GLOB, GGT, AML, LPSE in the last 72 hours. No lab exists for component: AMYP, HLPSE No results for input(s): INR, PTP, APTT in the last 72 hours. No lab exists for component: INREXT, INREXT No results for input(s): FE, TIBC, PSAT, FERR in the last 72 hours. No results found for: FOL, RBCF No results for input(s): PH, PCO2, PO2 in the last 72 hours. No results for input(s): CPK, CKNDX, TROIQ in the last 72 hours. No lab exists for component: CPKMB Lab Results Component Value Date/Time  Cholesterol, total 167 01/18/2016 12:00 AM  
 HDL Cholesterol 80 01/18/2016 12:00 AM  
 LDL,Direct 86 11/16/2016 04:35 AM  
 LDL, calculated 76 01/18/2016 12:00 AM  
 Triglyceride 54 01/18/2016 12:00 AM  
 
Lab Results Component Value Date/Time Glucose (POC) 146 (H) 10/22/2018 11:56 AM  
 Glucose (POC) 103 (H) 10/22/2018 05:56 AM  
 Glucose (POC) 126 (H) 10/21/2018 09:35 PM  
 Glucose (POC) 105 (H) 10/21/2018 04:30 PM  
 Glucose (POC) 101 (H) 10/21/2018 11:32 AM  
 
Lab Results Component Value Date/Time Color YELLOW/STRAW 10/17/2018 05:30 AM  
 Appearance CLEAR 10/17/2018 05:30 AM  
 Specific gravity 1.015 10/17/2018 05:30 AM  
 pH (UA) 6.0 10/17/2018 05:30 AM  
 Protein NEGATIVE  10/17/2018 05:30 AM  
 Glucose NEGATIVE  10/17/2018 05:30 AM  
 Ketone NEGATIVE  10/17/2018 05:30 AM  
 Bilirubin NEGATIVE  10/17/2018 05:30 AM  
 Urobilinogen 0.2 10/17/2018 05:30 AM  
 Nitrites POSITIVE (A) 10/17/2018 05:30 AM  
 Leukocyte Esterase TRACE (A) 10/17/2018 05:30 AM  
 Epithelial cells FEW 10/17/2018 05:30 AM  
 Bacteria NEGATIVE  10/17/2018 05:30 AM  
 WBC 0-4 10/17/2018 05:30 AM  
 RBC 0-5 10/17/2018 05:30 AM  
 
 
Medications Reviewed:  
 
Current Facility-Administered Medications Medication Dose Route Frequency  polyethylene glycol (MIRALAX) packet 17 g  17 g Oral DAILY PRN  
 senna-docusate (PERICOLACE) 8.6-50 mg per tablet 1 Tab  1 Tab Oral DAILY  glycerin (adult) suppository 1 Suppository  1 Suppository Rectal DAILY PRN  
 enoxaparin (LOVENOX) injection 40 mg  40 mg SubCUTAneous DAILY  sodium chloride (NS) flush 10 mL  10 mL InterCATHeter PRN  
 sodium chloride (NS) flush 10-40 mL  10-40 mL InterCATHeter Q8H  
 heparin (porcine) pf 300-500 Units  300-500 Units InterCATHeter PRN  
 alteplase (CATHFLO) 1 mg in sterile water (preservative free) 1 mL injection  1 mg InterCATHeter PRN  
 sodium chloride (NS) flush 10-30 mL  10-30 mL InterCATHeter PRN  
 methIMAzole (TAPAZOLE) tablet 7.5 mg  7.5 mg Oral DAILY  losartan (COZAAR) tablet 50 mg  50 mg Oral BID  acetaminophen (TYLENOL) tablet 650 mg  650 mg Oral Q6H PRN  
  insulin regular (NOVOLIN R, HUMULIN R) injection   SubCUTAneous AC&HS  
 glucose chewable tablet 16 g  4 Tab Oral PRN  
 dextrose (D50W) injection syrg 12.5-25 g  12.5-25 g IntraVENous PRN  
 glucagon (GLUCAGEN) injection 1 mg  1 mg IntraMUSCular PRN  
 amiodarone (CORDARONE) tablet 200 mg  200 mg Oral DAILY  oxybutynin (DITROPAN) tablet 10 mg  10 mg Oral DAILY  pravastatin (PRAVACHOL) tablet 80 mg  80 mg Oral QHS  oxyCODONE-acetaminophen (PERCOCET) 5-325 mg per tablet 1 Tab  1 Tab Oral Q4H PRN  
 
______________________________________________________________________ EXPECTED LENGTH OF STAY: - - - 
ACTUAL LENGTH OF STAY:          3 Kathlyne Schaumann, MD

## 2018-10-22 NOTE — PROGRESS NOTES
Bedside shift change report given to [facility] S Luly Gregorio (oncoming nurse) by Yane Martinez (offgoing nurse). Report included the following information SBAR, MAR, Recent Results and Med Rec Status.

## 2018-10-22 NOTE — PROGRESS NOTES
Problem: Mobility Impaired (Adult and Pediatric) Goal: *Acute Goals and Plan of Care (Insert Text) Physical Therapy Goals Initiated 10/18/2018 1. Patient will move from supine to sit and sit to supine  and roll side to side in bed with minimal assistance/contact guard assist within 7 day(s). 2.  Patient will transfer from bed to chair and chair to bed with minimal assistance/contact guard assist using the least restrictive device within 7 day(s). 3.  Patient will perform sit to stand with minimal assistance/contact guard assist within 7 day(s). 4.  Patient will ambulate with minimal assistance/contact guard assist for 50 feet with the least restrictive device within 7 day(s). physical Therapy TREATMENT Patient: China Olson (65 y.o. female) Date: 10/22/2018 Diagnosis: Inability to walk Fall Left hip pain Fall Left hip pain Left hip pain Precautions: Fall ; WBAT LLE Chart, physical therapy assessment, plan of care and goals were reviewed. ASSESSMENT: 
Patient received in bed and agreeable to therapy. She is somewhat confused, continuing to ask if she broke her leg. She states continues to have some pain in LLE and is stiff with mobility. She was able to sit EOB and lean forward. When attempt to stand with heavy posterior lean and unable to stand erect with walker. Attempted this several times and then did transfer with max assist of one without walker per stand pivot with heavy posterior lean. Once in chair, able to scoot back with supervision. Worked on LE exercise in chair. Recommend up to chair for meals and BSC as able with 2 person assist.. Progression toward goals: 
[]    Improving appropriately and progressing toward goals 
[]    Improving slowly and progressing toward goals 
[]    Not making progress toward goals and plan of care will be adjusted PLAN: 
Patient continues to benefit from skilled intervention to address the above impairments. Continue treatment per established plan of care. Discharge Recommendations:  Arnel Lundberg Further Equipment Recommendations for Discharge:  NONE SUBJECTIVE:  
Patient stated Did I break my leg? Carlos Real OBJECTIVE DATA SUMMARY:  
Critical Behavior: 
Neurologic State: Alert, Confused Orientation Level: Oriented to person, Oriented to situation, Disoriented to place, Disoriented to time Cognition: Follows commands, Memory loss Safety/Judgement: Awareness of environment, Fall prevention, Decreased awareness of need for assistance, Home safety Functional Mobility Training: 
Bed Mobility: 
  
Supine to Sit: Maximum assistance Scooting: Minimum assistance Transfers: 
Sit to Stand: Assist x1;Maximum assistance Stand to Sit: Maximum assistance Bed to Chair: Maximum assistance Balance: 
Sitting: Impaired; Without support Sitting - Static: Good (unsupported) Sitting - Dynamic: Fair (occasional) Standing: Impaired; With support Standing - Static: Poor;Constant support Standing - Dynamic : PoorAmbulation/Gait Training: 
  
  
 Therapeutic Exercises:  
Supine AAROM LLE; seated knee extension GILMAR; hip flex with active assist on L; active RLE After treatment:  
[x]    Patient left in no apparent distress sitting up in chair 
[]    Patient left in no apparent distress in bed 
[x]    Call bell left within reach [x]    Nursing notified 
[]    Caregiver present 
[]    Bed alarm activated COMMUNICATION/COLLABORATION:  
The patients plan of care was discussed with: Occupational Therapist and Registered Nurse Cecilia Rome PT Time Calculation: 32 mins

## 2018-10-23 VITALS
TEMPERATURE: 98.6 F | WEIGHT: 145 LBS | SYSTOLIC BLOOD PRESSURE: 152 MMHG | HEIGHT: 65 IN | DIASTOLIC BLOOD PRESSURE: 68 MMHG | OXYGEN SATURATION: 99 % | RESPIRATION RATE: 16 BRPM | HEART RATE: 60 BPM | BODY MASS INDEX: 24.16 KG/M2

## 2018-10-23 LAB
GLUCOSE BLD STRIP.AUTO-MCNC: 102 MG/DL (ref 65–100)
GLUCOSE BLD STRIP.AUTO-MCNC: 109 MG/DL (ref 65–100)
GLUCOSE BLD STRIP.AUTO-MCNC: 92 MG/DL (ref 65–100)
SERVICE CMNT-IMP: ABNORMAL
SERVICE CMNT-IMP: ABNORMAL
SERVICE CMNT-IMP: NORMAL

## 2018-10-23 PROCEDURE — 74011250636 HC RX REV CODE- 250/636: Performed by: INTERNAL MEDICINE

## 2018-10-23 PROCEDURE — 74011250637 HC RX REV CODE- 250/637: Performed by: INTERNAL MEDICINE

## 2018-10-23 PROCEDURE — 74011000250 HC RX REV CODE- 250: Performed by: HOSPITALIST

## 2018-10-23 PROCEDURE — 82962 GLUCOSE BLOOD TEST: CPT

## 2018-10-23 RX ORDER — POLYETHYLENE GLYCOL 3350 17 G/17G
17 POWDER, FOR SOLUTION ORAL DAILY
Qty: 30 EACH | Refills: 0 | Status: SHIPPED
Start: 2018-10-23 | End: 2019-06-02

## 2018-10-23 RX ORDER — POLYETHYLENE GLYCOL 3350 17 G/17G
17 POWDER, FOR SOLUTION ORAL DAILY
Status: DISCONTINUED | OUTPATIENT
Start: 2018-10-24 | End: 2018-10-23 | Stop reason: SDUPTHER

## 2018-10-23 RX ORDER — AMOXICILLIN 250 MG
1 CAPSULE ORAL
Qty: 30 TAB | Refills: 0 | Status: SHIPPED
Start: 2018-10-23 | End: 2020-08-11

## 2018-10-23 RX ORDER — POLYETHYLENE GLYCOL 3350 17 G/17G
17 POWDER, FOR SOLUTION ORAL DAILY
Status: DISCONTINUED | OUTPATIENT
Start: 2018-10-23 | End: 2018-10-23 | Stop reason: HOSPADM

## 2018-10-23 RX ADMIN — Medication 10 ML: at 06:44

## 2018-10-23 RX ADMIN — ENOXAPARIN SODIUM 40 MG: 40 INJECTION SUBCUTANEOUS at 09:01

## 2018-10-23 RX ADMIN — AMIODARONE HYDROCHLORIDE 200 MG: 200 TABLET ORAL at 09:02

## 2018-10-23 RX ADMIN — POLYETHYLENE GLYCOL 3350 17 G: 17 POWDER, FOR SOLUTION ORAL at 12:09

## 2018-10-23 RX ADMIN — OXYBUTYNIN CHLORIDE 10 MG: 5 TABLET ORAL at 09:02

## 2018-10-23 RX ADMIN — METHIMAZOLE 7.5 MG: 5 TABLET ORAL at 09:02

## 2018-10-23 RX ADMIN — OXYCODONE AND ACETAMINOPHEN 1 TABLET: 5; 325 TABLET ORAL at 04:01

## 2018-10-23 RX ADMIN — SENNOSIDES AND DOCUSATE SODIUM 1 TABLET: 8.6; 5 TABLET ORAL at 09:01

## 2018-10-23 RX ADMIN — HEPARIN 300 UNITS: 100 SYRINGE at 15:03

## 2018-10-23 NOTE — PROGRESS NOTES
CM follow up. CM spoke with Merly Dunne of [facility],  who confirmed that patient is accepted and that bed is available today . Nurse Report to be called to [phone]. CM sent referral via [employer] to  [address] [facility]) (Phone [phone]) for BLS transport, and referral was accepted  for a  4:30APM . CM completed PCS and placed it on chart.  CM gave verbal update to staff nurse and hospitalist.

## 2018-10-23 NOTE — DISCHARGE INSTRUCTIONS
Discharge SNF/Rehab Instructions/LTAC       PATIENT ID: Adriana Delvalle  MRN: [id number]   YOB: 1940    DATE OF ADMISSION: 10/17/2018  5:08 AM    DATE OF DISCHARGE: 10/23/2018    PRIMARY CARE PROVIDER: Aaron George MD       ATTENDING PHYSICIAN: Sita Sue MD  DISCHARGING PROVIDER: Willie Wilson MD     To contact this individual call [phone] and ask the  to page. If unavailable ask to be transferred the Adult Hospitalist Department. CONSULTATIONS: IP CONSULT TO HOSPITALIST  IP CONSULT TO ORTHOPEDIC SURGERY    PROCEDURES/SURGERIES: * No surgery found *    ADMITTING DIAGNOSES & HOSPITAL COURSE:   A 66years old female with  PMH bladder Ca, A Fib s/p pacemaker, CVA, DM, Dementia was [facility] after a [facility], left hip severe pain was admitted for further work up and management. Fall and ambulatory dysfunction  - admitted from [facility]  - she uses a walker   - CT LLE: No fracture. Mild left hip osteoarthritis. Labral chondrocalcinosis  - Ortho consulted: Suspect her recent fall has exacerbated symptoms of underlying degenerative changes. - Can't have MRI in [facility] due to PPM, got bone scan that ruled out fracures, medically ready for dc to SNF per Orthopedics. - continue PT/OT   Hx stage II bladder cancer s/p TURBT, chemo and XRT  -continue follow up with [facility] oncology  - Hx urinary incontinence: c/w oxybutnin   Constipation  - improved, continue laxative prn  A Fib - darius s/p pacemaker  - rate controlled on amiodarone  Hx bilateral DVT  -Completed treatment  CVA - hemorrhagic  -stable  DM-II  - A1c 5.7  -continue sliding scale  HLD   -continue statin  HTN  -BP normal, continue with losartan  -stop amlodipine   Hyperthyroidism  - TSH 6.5. continue methimazole     Code status: Full  DVT prophylaxis: Lovenox    DISCHARGE DIAGNOSES / PLAN:      Fall and ambulatory dysfunction  - CT LLE: No fracture. Mild left hip osteoarthritis.  Labral chondrocalcinosis  - continue PT/OT   Hx stage II bladder cancer s/p TURBT, chemo and XRT  -continue follow up with [facility] oncology  - Hx urinary incontinence: continue oxybutnin   Constipation  - improved, continue laxative prn  A Fib - darius s/p pacemaker  - rate controlled on amiodarone  Hx bilateral DVT  -Completed treatment  CVA - hemorrhagic  -stable  DM-II  - A1c 5.7  -continue sliding scale  HLD   -continue statin  HTN  -BP normal, continue with losartan  -stop amlodipine   Hyperthyroidism  - TSH 6.5. continue methimazole    PENDING TEST RESULTS:   At the time of discharge the following test results are still pending: none     FOLLOW UP APPOINTMENTS:    Follow-up Information     Follow up With Specialties Details Why Contact Info    Karla Aquino MD Geriatric Medicine In one week  [address]      Chema Titus MD Orthopedic Surgery In one week  [address] [facility] [address] [phone]       ADDITIONAL CARE RECOMMENDATIONS:     DIET: Diabetic Diet    TUBE FEEDING INSTRUCTIONS: none    OXYGEN / BiPAP SETTINGS: none    ACTIVITY: Activity as tolerated    WOUND CARE: none    EQUIPMENT needed: none      DISCHARGE MEDICATIONS:   See Medication Reconciliation Form      NOTIFY YOUR PHYSICIAN FOR ANY OF THE FOLLOWING:   Fever over 101 degrees for 24 hours. Chest pain, shortness of breath, fever, chills, nausea, vomiting, diarrhea, change in mentation, falling, weakness, bleeding. Severe pain or pain not relieved by medications. Or, any other signs or symptoms that you may have questions about.     DISPOSITION:    Home With:   OT  PT  HH  RN      x SNF/Inpatient Rehab/LTAC    Independent/assisted living    Hospice    Other:       PATIENT CONDITION AT DISCHARGE:     Functional status    Poor    x Deconditioned     Independent      Cognition   x  Lucid     Forgetful     Dementia      Catheters/lines (plus indication)    Schmidt     PICC PEG    x None      Code status   x  Full code     DNR      PHYSICAL EXAMINATION AT DISCHARGE:   Refer to Progress Note      CHRONIC MEDICAL DIAGNOSES:  Problem List as of 10/23/2018 Date Reviewed: 10/16/2018          Codes Class Noted - Resolved    Fall ICD-10-CM: W19Chiara Alvarado  ICD-9-CM: E888.9  10/17/2018 - Present        * (Principal) Left hip pain ICD-10-CM: M25.552  ICD-9-CM: 719.45  10/17/2018 - Present        Inability to walk ICD-10-CM: R26.2  ICD-9-CM: 719.7  10/17/2018 - Present        Acute deep vein thrombosis (DVT) of proximal vein of both lower extremities (HCC) ICD-10-CM: I82.4Y3  ICD-9-CM: 453.41  10/20/2017 - Present        Acute CVA (cerebrovascular accident) [facility]) ICD-10-CM: I63.9  ICD-9-CM: 434.91  11/16/2016 - Present    Overview Signed 11/16/2016 12:11 PM by Nicci Bae     - unable to perform MRI, but clinically had CVA             Bladder cancer ([facility] ICD-10-CM: C67.9  ICD-9-CM: 188.9  Unknown - Present        Left-sided weakness ICD-10-CM: R53.1  ICD-9-CM: 728.87  11/15/2016 - Present        Dysarthria ICD-10-CM: R47.1  ICD-9-CM: 784.51  11/15/2016 - Present        [facility] ([facility] ICD-10-CM: I49.5  ICD-9-CM: 427.81  10/25/2016 - Present        Pacemaker ICD-10-CM: Z95.0  ICD-9-CM: V45.01  10/25/2016 - Present        Multinodular goiter ICD-10-CM: E04.2  ICD-9-CM: 241.1  1/29/2013 - Present        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 ICD-10-CM: E05.80  ICD-9-CM: 242.80  1/16/2013 - Present        HTN (hypertension) ICD-10-CM: I10  ICD-9-CM: 401.9  Unknown - Present        Hyperlipidemia ICD-10-CM: E78.5  ICD-9-CM: 272.4  Unknown - Present        Atrial fibrillation ([facility] ICD-10-CM: I48.91  ICD-9-CM: 427.31  10/6/2010 - Present        Hypertension ICD-10-CM: I10  ICD-9-CM: 401.9  10/6/2010 - Present        Diabetes ([facility] ICD-10-CM: E11.9  ICD-9-CM: 250.00  10/6/2010 - Present        RESOLVED: Atrial fibrillation ([facility] ICD-10-CM: I48.91  ICD-9-CM: 427.31  Unknown - 10/20/2017 RESOLVED: DM (diabetes mellitus) ([facility] ICD-10-CM: E11.9  ICD-9-CM: 250.00  Unknown - 10/20/2017                CDMP Checked:   Yes x     PROBLEM LIST Updated:  Yes x         Signed:   Sharron Denis MD  10/23/2018  8:33 AM

## 2018-10-23 NOTE — PROGRESS NOTES
Assumed care of pt this am. Pt is alert but confused and per report only gets more confused as the day progresses. Pt to be discharged this afternoon at 2 pm.TRANSFER - OUT REPORT: 
 
Verbal report given to Dottie(name) on Mariah Marker  being transferred to [place](unit) for routine progression of care Report consisted of patients Situation, Background, Assessment and  
Recommendations(SBAR). Information from the following report(s) SBAR, Kardex and Recent Results was reviewed with the receiving nurse. Opportunity for questions and clarification was provided. Pt provided with a copy of discharge instructions. Port hep locked.

## 2018-10-23 NOTE — DISCHARGE SUMMARY
Discharge Summary       PATIENT ID: Yani Yoder  MRN: [id number]   YOB: 1940    DATE OF ADMISSION: 10/17/2018  5:08 AM    DATE OF DISCHARGE: 10/23/2018   PRIMARY CARE PROVIDER: Shalonda Benitez MD     ATTENDING PHYSICIAN: Obed Lemons MD  DISCHARGING PROVIDER: Gamaliel Bills MD    To contact this individual call [phone] and ask the  to page. If unavailable ask to be transferred the Adult Hospitalist Department. CONSULTATIONS: IP CONSULT TO HOSPITALIST  IP CONSULT TO ORTHOPEDIC SURGERY    PROCEDURES/SURGERIES: * No surgery found *    ADMITTING DIAGNOSES & HOSPITAL COURSE:     A 66years old female with  PMH bladder Ca, A Fib s/p pacemaker, CVA, DM, Dementia [facility] a [facility], left hip severe pain was admitted for further work up and management. Fall and ambulatory dysfunction  - [facility]  - she uses a walker   - CT LLE: No fracture. Mild left hip osteoarthritis. Labral chondrocalcinosis  - Ortho consulted: Suspect her recent fall has exacerbated symptoms of underlying degenerative changes. - Can't have MRI in [address] [place] [address] due to PPM, got bone scan that ruled out fracures, medically ready for dc to SNF per Orthopedics. - continue PT/OT   Hx stage II bladder cancer s/p TURBT, chemo and XRT  -continue follow up with [facility] oncology  - Hx urinary incontinence: c/w oxybutnin   Constipation  - improved, continue laxative prn  Chronic A Fib - darius s/p pacemaker  - rate controlled on amiodarone  Hx bilateral DVT  -Completed treatment  CVA - hemorrhagic  -stable  DM-II  - A1c 5.7  -continue sliding scale  HLD   -continue statin  HTN  -BP normal, continue with losartan  -stop amlodipine   Hyperthyroidism  - TSH 6.5. continue methimazole     Code status: Full  DVT prophylaxis: Lovenox    DISCHARGE DIAGNOSES / PLAN:      Fall and ambulatory dysfunction  - CT LLE: No fracture. Mild left hip osteoarthritis.  Labral chondrocalcinosis  - continue PT/OT   Hx stage II bladder cancer s/p TURBT, chemo and XRT  -continue follow up with [facility] oncology  - Hx urinary incontinence: continue oxybutnin   Constipation  - improved, continue laxative prn  Chronic A Fib - darius s/p pacemaker  - rate controlled on amiodarone  Hx bilateral DVT  -Completed treatment  CVA - hemorrhagic  -stable  DM-II  - A1c 5.7  -continue sliding scale  HLD   -continue statin  HTN  -BP normal, continue with losartan  -stop amlodipine   Hyperthyroidism  - TSH 6.5. continue methimazole    PENDING TEST RESULTS:   At the time of discharge the following test results are still pending: none    FOLLOW UP APPOINTMENTS:    Follow-up Information     Follow up With Specialties Details Why Contact Info    Jed Ge MD Geriatric Medicine In one week  [address] [address]      Shane Paniagua MD Orthopedic Surgery In one week  [address] [facility] [address]  [phone]             ADDITIONAL CARE RECOMMENDATIONS:     DIET: Diabetic Diet    ACTIVITY: Activity as tolerated    WOUND CARE: none    EQUIPMENT needed: none      DISCHARGE MEDICATIONS:  Current Discharge Medication List      START taking these medications    Details   polyethylene glycol (MIRALAX) 17 gram packet Take 1 Packet by mouth daily. Qty: 30 Each, Refills: 0      senna-docusate (PERICOLACE) 8.6-50 mg per tablet Take 1 Tab by mouth daily as needed for Constipation. Qty: 30 Tab, Refills: 0      oxyCODONE-acetaminophen (PERCOCET) 5-325 mg per tablet Take 1 Tab by mouth every four (4) hours as needed. Max Daily Amount: 6 Tabs. Qty: 12 Tab, Refills: 0    Associated Diagnoses: Left hip pain; Fall, initial encounter         CONTINUE these medications which have NOT CHANGED    Details   amLODIPine (NORVASC) 2.5 mg tablet Take  by mouth daily. diclofenac (VOLTAREN) 1 % gel Apply  to affected area four (4) times daily.       oxybutynin (DITROPAN) 5 mg tablet Take 10 mg by mouth daily. pravastatin (PRAVACHOL) 40 mg tablet Take 80 mg by mouth daily. methIMAzole (TAPAZOLE) 5 mg tablet Take 7.5 mg by mouth daily. acetaminophen (TYLENOL) 650 mg TbER Take 650 mg by mouth every eight (8) hours. losartan (COZAAR) 50 mg tablet Take 50 mg by mouth two (2) times a day. amiodarone (CORDARONE) 200 mg tablet Take 200 mg by mouth daily. Associated Diagnoses: Subclinical hyperthyroidism               NOTIFY YOUR PHYSICIAN FOR ANY OF THE FOLLOWING:   Fever over 101 degrees for 24 hours. Chest pain, shortness of breath, fever, chills, nausea, vomiting, diarrhea, change in mentation, falling, weakness, bleeding. Severe pain or pain not relieved by medications. Or, any other signs or symptoms that you may have questions about. DISPOSITION:    Home With:   OT  PT  HH  RN      x Long term SNF/Inpatient Rehab    Independent/assisted living    Hospice    Other:       PATIENT CONDITION AT DISCHARGE:     Functional status    Poor    x Deconditioned     Independent      Cognition   x  Lucid     Forgetful     Dementia      Catheters/lines (plus indication)    Schmidt     PICC     PEG    x None      Code status   x  Full code     DNR      PHYSICAL EXAMINATION AT DISCHARGE:   Refer to Progress Note      CHRONIC MEDICAL DIAGNOSES:  Problem List as of 10/23/2018 Date Reviewed: 10/16/2018          Codes Class Noted - Resolved    Fall ICD-10-CM: W19. Jen Lu  ICD-9-CM: E888.9  10/17/2018 - Present        * (Principal) Left hip pain ICD-10-CM: M25.552  ICD-9-CM: 719.45  10/17/2018 - Present        Inability to walk ICD-10-CM: R26.2  ICD-9-CM: 719.7  10/17/2018 - Present        Acute deep vein thrombosis (DVT) of proximal vein of both lower extremities (HCC) ICD-10-CM: I82.4Y3  ICD-9-CM: 453.41  10/20/2017 - Present        Acute CVA (cerebrovascular accident) [facility]) ICD-10-CM: I63.9  ICD-9-CM: 434.91  11/16/2016 - Present    Overview Signed 11/16/2016 12:11 PM by Hoda Gutierrez unable to perform MRI, but clinically had CVA             Bladder cancer ([facility] ICD-10-CM: C67.9  ICD-9-CM: 188.9  Unknown - Present        Left-sided weakness ICD-10-CM: R53.1  ICD-9-CM: 728.87  11/15/2016 - Present        Dysarthria ICD-10-CM: R47.1  ICD-9-CM: 784.51  11/15/2016 - Present        Sick sinus syndrome ([facility] ICD-10-CM: I49.5  ICD-9-CM: 427.81  10/25/2016 - Present        Pacemaker ICD-10-CM: Z95.0  ICD-9-CM: V45.01  10/25/2016 - Present        Multinodular goiter ICD-10-CM: E04.2  ICD-9-CM: 241.1  1/29/2013 - Present        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 ICD-10-CM: E05.80  ICD-9-CM: 242.80  1/16/2013 - Present        HTN (hypertension) ICD-10-CM: I10  ICD-9-CM: 401.9  Unknown - Present        Hyperlipidemia ICD-10-CM: E78.5  ICD-9-CM: 272.4  Unknown - Present        Atrial fibrillation ([facility] ICD-10-CM: I48.91  ICD-9-CM: 427.31  10/6/2010 - Present        Hypertension ICD-10-CM: I10  ICD-9-CM: 401.9  10/6/2010 - Present        Diabetes ([facility] ICD-10-CM: E11.9  ICD-9-CM: 250.00  10/6/2010 - Present        RESOLVED: Atrial fibrillation ([facility] ICD-10-CM: I48.91  ICD-9-CM: 427.31  Unknown - 10/20/2017        RESOLVED: DM (diabetes mellitus) ([facility] ICD-10-CM: E11.9  ICD-9-CM: 250.00  Unknown - 10/20/2017              Greater than 35 minutes were spent with the patient on counseling and coordination of care    Signed:   Maged Lagunas MD  10/23/2018  11:44 AM

## 2018-10-23 NOTE — PROGRESS NOTES
Hospitalist Progress Note Eran Valle MD 
Answering service: [phone] OR [phone] from in house phone Cell: [phone] Date of Service:  10/23/2018 NAME:  Abbe Hendrix :  1940 MRN:  [id number] Admission Summary: A 66years old female with  PMH bladder Ca, A Fib s/p pacemaker, CVA, DM, Dementia [facility] after a [facility], left hip severe pain was admitted for further work up and management. Interval history / Subjective: She said couldn't use her left leg, no chest pain or shortness of breath Assessment & Plan:  
 
Fall and ambulatory dysfunction 
- admitted [facility] 
- she uses a walker - CT LLE: No fracture. Mild left hip osteoarthritis. Labral chondrocalcinosis - Ortho consulted: Suspect her recent fall has exacerbated symptoms of underlying degenerative changes. - Can't have MRI in [facility] due to PPM, got bone scan that ruled out fracures, medically ready for dc to SNF per Orthopedics. - continue PT/OT Hx stage II bladder cancer s/p TURBT, chemo and XRT 
-continue follow up with [facility] oncology - Hx urinary incontinence: c/w oxybutnin  
Constipation 
- improved, continue laxative prn A Fib - darius s/p pacemaker 
- rate controlled on amiodarone Hx bilateral DVT 
-Completed treatment CVA - hemorrhagic 
-stable DM-II 
- A1c 5.7 
-continue sliding scale HLD  
-continue statin HTN 
-BP normal, continue with losartan 
-stop amlodipine Hyperthyroidism 
- TSH 6.5. continue methimazole 
  
Code status: Full DVT prophylaxis: Lovenox Care Plan discussed with: Patient/Family, Nurse and  Disposition: SNF/LTC I called her daughterCamryn at [phone] and updated the clinical condition, management and discharge plan, answer questions Hospital Problems  Date Reviewed: 10/16/2018 Codes Class Noted POA Fall ICD-10-CM: W19. Meryle Ducking ICD-9-CM: M926.6  10/17/2018 Unknown * (Principal) Left hip pain ICD-10-CM: M25.552 ICD-9-CM: 719.45  10/17/2018 Unknown Inability to walk ICD-10-CM: R26.2 ICD-9-CM: 719.7  10/17/2018 Unknown Vital Signs:  
 Last 24hrs VS reviewed since prior progress note. Most recent are: 
Visit Vitals BP 99/61 Pulse 60 Temp 98.9 °F (37.2 °C) Resp 16 Ht 5' 5\" (1.651 m) Wt 65.8 kg (145 lb) SpO2 95% BMI 24.13 kg/m² Intake/Output Summary (Last 24 hours) at 10/23/2018 3563 Last data filed at 10/23/2018 1572 Gross per 24 hour Intake 1200 ml Output  Net 1200 ml Physical Examination:  
 
 
     
Constitutional:  No acute distress, cooperative, pleasant   
ENT:  Oral mucous moist, oropharynx benign. Neck supple, Resp:  CTA bilaterally. No wheezing/rhonchi/rales. No accessory muscle use CV:  Regular rhythm, normal rate, no murmurs, gallops, rubs GI:  Soft, non distended, non tender. normoactive bowel sounds, no hepatosplenomegaly Musculoskeletal:  No edema Neurologic:  Conscious and well oriented, motor UE 5/5, LE 2/5  AAOx3, CN II-XII reviewed Skin:  Good turgor, no rashes or ulcers Data Review:  
 Review and/or order of clinical lab test 
 
 
Labs:  
 
Recent Labs 10/22/18 
1605 WBC 3.6 HGB 10.6* HCT 33.8*  
 Recent Labs 10/22/18 
5874   
K 4.0  
 CO2 27 BUN 21* CREA 0.87 * CA 8.4* No results for input(s): SGOT, GPT, ALT, AP, TBIL, TBILI, TP, ALB, GLOB, GGT, AML, LPSE in the last 72 hours. No lab exists for component: AMYP, HLPSE No results for input(s): INR, PTP, APTT in the last 72 hours. No lab exists for component: INREXT No results for input(s): FE, TIBC, PSAT, FERR in the last 72 hours. No results found for: FOL, RBCF No results for input(s): PH, PCO2, PO2 in the last 72 hours. No results for input(s): CPK, CKNDX, TROIQ in the last 72 hours. No lab exists for component: CPKMB Lab Results Component Value Date/Time Cholesterol, total 167 01/18/2016 12:00 AM  
 HDL Cholesterol 80 01/18/2016 12:00 AM  
 LDL,Direct 86 11/16/2016 04:35 AM  
 LDL, calculated 76 01/18/2016 12:00 AM  
 Triglyceride 54 01/18/2016 12:00 AM  
 
Lab Results Component Value Date/Time Glucose (POC) 109 (H) 10/23/2018 08:46 AM  
 Glucose (POC) 107 (H) 10/22/2018 09:44 PM  
 Glucose (POC) 94 10/22/2018 04:29 PM  
 Glucose (POC) 146 (H) 10/22/2018 11:56 AM  
 Glucose (POC) 103 (H) 10/22/2018 05:56 AM  
 
Lab Results Component Value Date/Time Color YELLOW/STRAW 10/17/2018 05:30 AM  
 Appearance CLEAR 10/17/2018 05:30 AM  
 Specific gravity 1.015 10/17/2018 05:30 AM  
 pH (UA) 6.0 10/17/2018 05:30 AM  
 Protein NEGATIVE  10/17/2018 05:30 AM  
 Glucose NEGATIVE  10/17/2018 05:30 AM  
 Ketone NEGATIVE  10/17/2018 05:30 AM  
 Bilirubin NEGATIVE  10/17/2018 05:30 AM  
 Urobilinogen 0.2 10/17/2018 05:30 AM  
 Nitrites POSITIVE (A) 10/17/2018 05:30 AM  
 Leukocyte Esterase TRACE (A) 10/17/2018 05:30 AM  
 Epithelial cells FEW 10/17/2018 05:30 AM  
 Bacteria NEGATIVE  10/17/2018 05:30 AM  
 WBC 0-4 10/17/2018 05:30 AM  
 RBC 0-5 10/17/2018 05:30 AM  
 
 
 
Medications Reviewed:  
 
Current Facility-Administered Medications Medication Dose Route Frequency  polyethylene glycol (MIRALAX) packet 17 g  17 g Oral DAILY  [START ON 10/24/2018] polyethylene glycol (MIRALAX) packet 17 g  17 g Oral DAILY  senna-docusate (PERICOLACE) 8.6-50 mg per tablet 1 Tab  1 Tab Oral DAILY  glycerin (adult) suppository 1 Suppository  1 Suppository Rectal DAILY PRN  
 enoxaparin (LOVENOX) injection 40 mg  40 mg SubCUTAneous DAILY  sodium chloride (NS) flush 10 mL  10 mL InterCATHeter PRN  
 sodium chloride (NS) flush 10-40 mL  10-40 mL InterCATHeter Q8H  
 heparin (porcine) pf 300-500 Units  300-500 Units InterCATHeter PRN  
  alteplase (CATHFLO) 1 mg in sterile water (preservative free) 1 mL injection  1 mg InterCATHeter PRN  
 sodium chloride (NS) flush 10-30 mL  10-30 mL InterCATHeter PRN  
 methIMAzole (TAPAZOLE) tablet 7.5 mg  7.5 mg Oral DAILY  losartan (COZAAR) tablet 50 mg  50 mg Oral BID  acetaminophen (TYLENOL) tablet 650 mg  650 mg Oral Q6H PRN  
 insulin regular (NOVOLIN R, HUMULIN R) injection   SubCUTAneous AC&HS  
 glucose chewable tablet 16 g  4 Tab Oral PRN  
 dextrose (D50W) injection syrg 12.5-25 g  12.5-25 g IntraVENous PRN  
 glucagon (GLUCAGEN) injection 1 mg  1 mg IntraMUSCular PRN  
 amiodarone (CORDARONE) tablet 200 mg  200 mg Oral DAILY  oxybutynin (DITROPAN) tablet 10 mg  10 mg Oral DAILY  pravastatin (PRAVACHOL) tablet 80 mg  80 mg Oral QHS  oxyCODONE-acetaminophen (PERCOCET) 5-325 mg per tablet 1 Tab  1 Tab Oral Q4H PRN  
 
______________________________________________________________________ EXPECTED LENGTH OF STAY: 2d 16h ACTUAL LENGTH OF STAY:          4 Willie Wilson MD

## 2018-10-23 NOTE — PROGRESS NOTES
NUTRITION  
RD Screen Pt seen for:   
 
[]  MST for n/a    []   MD Consult   
[]        Supplements  []   PO intake check  
[]        Food Allergies  []   Food Preferences/tolerances [x]        Rescreen  []   Education []        Diet order clarification []   Other  
[]  LOS Nutrition Prescription:  
 
Diet Order adjusted: Regular, No Added Salt (AKOSUA), No Concentrated Sweets (NCS) Encourage adequate intake of meals and supplements Assessment:  
Information obtained from:   patient Pt admitted for severe hip pain s/p fall. PMHx: bladder ca, A. Fib s/p ppm, CVA, DM, Dementia. Pt is eating well although in interview pt requested to be able to have \"just a little salt\". B/p well controlled, no edema or SOB, wt is ~6% below UBW from earlier this year per medical records, PO records show good intake over the last 3 days. History noted of DM but no carb restriction in diet orders. BG has been fairly well controlled but pt will benefit from No Concentrated Sweets restriction. Cultural, Buddhist and ethnic food preferences:  
[x]  None  
[]  Identified and addressed Diet:  Cardiac, Regular PO Intake: [x]  Good     []  Fair      []   Poor Patient Vitals for the past 100 hrs: 
 % Diet Eaten 10/23/18 0642 100 % 10/22/18 1701 90 % 10/22/18 1221 75 % 10/22/18 0853 100 % 10/21/18 0953 100 % 10/20/18 0856 50 % Wt Readings from Last 5 Encounters:  
10/23/18 65.8 kg (145 lb) 10/17/18 71.3 kg (157 lb 3 oz) 10/16/18 67.2 kg (148 lb 3.2 oz) 03/02/18 71.7 kg (158 lb) 01/08/18 71.7 kg (158 lb) ] Last 3 Recorded Weights in this Encounter 10/17/18 0151 10/23/18 5445 Weight: 71.3 kg (157 lb 3 oz) 65.8 kg (145 lb) Body mass index is 24.13 kg/m². Skin: intact BM: 10/17/18 ABD: soft, +bs Weight Changes:  
[x]   Loss 
[]   Gain 
[]   Stable Nutrition Problems Identified[]     None 
[x]     Specified food preferences []     Dislikes supplements             
[]     Allergies []     Difficulty chewing    
[]     Dentition  
[]     Nausea/Vomiting 
[]    Constipation []    Diarrhea Nutrition Diagnosis: No nutrition diagnosis identified at this time Intervention:  
[x]    Obtained/adjusted food preferences/tolerances and/or snacks options []    Dislikes supplements will try a substitution []    Modify diet for food allergies []    Adjust texture due to difficulty chewing  
[x]    Encourage Fresh Fruit, Activia yogurt, fluid  
[]    Educated patient [x]    Rescreen per screening protocol 
[]    Add Supplements Goal: n/a Monitoring/Evaluation:  
Education & Discharge Needs 
[] Nutrition related discharge needs addressed:  
[] Supplements (on d/c instruction &/or coupons provided) [] Education  
[x] No nutrition related discharge needs at this time Rescreen: []  At Nutrition Risk  
       [x]  Not at Nutrition Risk, rescreen per screening protocol Dhruv Panda MS, RD, CDE Pager #[phone] or [phone]

## 2019-04-25 ENCOUNTER — APPOINTMENT (OUTPATIENT)
Dept: CT IMAGING | Age: 79
End: 2019-04-25
Attending: EMERGENCY MEDICINE
Payer: MEDICARE

## 2019-04-25 ENCOUNTER — HOSPITAL ENCOUNTER (EMERGENCY)
Age: 79
Discharge: NURSING, REHAB OR LONG-TERM CARE | End: 2019-04-25
Attending: EMERGENCY MEDICINE | Admitting: EMERGENCY MEDICINE
Payer: MEDICARE

## 2019-04-25 VITALS
RESPIRATION RATE: 20 BRPM | WEIGHT: 135.36 LBS | BODY MASS INDEX: 22.55 KG/M2 | DIASTOLIC BLOOD PRESSURE: 67 MMHG | HEART RATE: 75 BPM | TEMPERATURE: 98.4 F | OXYGEN SATURATION: 100 % | SYSTOLIC BLOOD PRESSURE: 152 MMHG | HEIGHT: 65 IN

## 2019-04-25 DIAGNOSIS — S01.01XA LACERATION OF SCALP, INITIAL ENCOUNTER: Primary | ICD-10-CM

## 2019-04-25 PROCEDURE — 77030018836 HC SOL IRR NACL ICUM -A

## 2019-04-25 PROCEDURE — 77030002986 HC SUT PROL J&J -A

## 2019-04-25 PROCEDURE — 70450 CT HEAD/BRAIN W/O DYE: CPT

## 2019-04-25 PROCEDURE — 77030002933 HC SUT MCRYL J&J -A

## 2019-04-25 PROCEDURE — 72125 CT NECK SPINE W/O DYE: CPT

## 2019-04-25 PROCEDURE — 74011000250 HC RX REV CODE- 250: Performed by: EMERGENCY MEDICINE

## 2019-04-25 PROCEDURE — 99284 EMERGENCY DEPT VISIT MOD MDM: CPT

## 2019-04-25 PROCEDURE — 75810000293 HC SIMP/SUPERF WND  RPR

## 2019-04-25 RX ORDER — LIDOCAINE HYDROCHLORIDE AND EPINEPHRINE 10; 10 MG/ML; UG/ML
1.5 INJECTION, SOLUTION INFILTRATION; PERINEURAL
Status: COMPLETED | OUTPATIENT
Start: 2019-04-25 | End: 2019-04-25

## 2019-04-25 RX ORDER — BACITRACIN 500 UNIT/G
1 PACKET (EA) TOPICAL
Status: COMPLETED | OUTPATIENT
Start: 2019-04-25 | End: 2019-04-25

## 2019-04-25 RX ADMIN — LIDOCAINE HYDROCHLORIDE AND EPINEPHRINE 15 MG: 10; 10 INJECTION, SOLUTION INFILTRATION; PERINEURAL at 15:38

## 2019-04-25 RX ADMIN — BACITRACIN 1 PACKET: 500 OINTMENT TOPICAL at 17:26
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
## 2019-04-25 NOTE — ED PROVIDER NOTES
77-year-old female history of diabetes, atrial fibrillation, dementia, presenting for a fall. Patient was at her nursing home, got up from a stool, no due to mechanical cause and  Landed on the left side of her head. She denies any loss of consciousness. She denies any neck or back pain. She is having no chest pain or shortness of breath. EMS noted a laceration behind the left ear and a small laceration to the left ear itself. Patient denies taking any blood thinning medication. Past Medical History:  
Diagnosis Date  Atrial fibrillation ([address]  Atrial fibrillation [facility]  Bladder cancer ([facility]  Constipation  CVA (cerebral vascular accident) ([facility]  Dementia  Diabetes ([facility]  Diverticulitis  DM (diabetes mellitus) ([facility]  Essential hypertension  Hemorrhoids  HTN (hypertension)  Hyperlipidemia Past Surgical History:  
Procedure Laterality Date  HX HYSTERECTOMY  HX PACEMAKER PLACEMENT Left  HX TUBAL LIGATION    
 POLYPECTOMY- HOT BX Family History:  
Problem Relation Age of Onset  Hypertension Mother  Stroke Mother  Liver Disease Father  Hypertension Father Social History Socioeconomic History  Marital status: SINGLE Spouse name: Not on file  Number of children: Not on file  Years of education: Not on file  Highest education level: Not on file Occupational History  Not on file Social Needs  Financial resource strain: Not on file  Food insecurity:  
  Worry: Not on file Inability: Not on file  Transportation needs:  
  Medical: Not on file Non-medical: Not on file Tobacco Use  Smoking status: Never Smoker  Smokeless tobacco: Never Used Substance and Sexual Activity  Alcohol use: No  
 Drug use: No  
 Sexual activity: Never Lifestyle  Physical activity:  
  Days per week: Not on file Minutes per session: Not on file  Stress: Not on file Relationships  Social connections:  
  Talks on phone: Not on file Gets together: Not on file Attends [employer] service: Not on file Active member of club or organization: Not on file Attends meetings of clubs or organizations: Not on file Relationship status: Not on file  Intimate partner violence:  
  Fear of current or ex partner: Not on file Emotionally abused: Not on file Physically abused: Not on file Forced sexual activity: Not on file Other Topics Concern  Not on file Social History Narrative ** Merged History Encounter ** ALLERGIES: Erythromycin; Flagyl [metronidazole]; Macrobid [nitrofurantoin monohyd/m-cryst]; Pcn [penicillins]; and Sulfa (sulfonamide antibiotics) Review of Systems Constitutional: Negative for chills and fever. HENT: Negative for congestion. Eyes: Negative for visual disturbance. Respiratory: Negative for shortness of breath. Cardiovascular: Negative for chest pain. Gastrointestinal: Negative for abdominal pain, nausea and vomiting. Genitourinary: Negative for dysuria and hematuria. Musculoskeletal: Negative for back pain. Skin: Negative for rash. Allergic/Immunologic: Negative for immunocompromised state. Neurological: Negative for syncope, weakness and headaches. Psychiatric/Behavioral: Negative for confusion. Vitals:  
 04/25/19 1455 BP: 139/72 Pulse: 71 Resp: 20 Temp: 98.4 °F (36.9 °C) SpO2: 100% Weight: 61.4 kg (135 lb 5.8 oz) Height: 5' 5\" (1.651 m) Physical Exam  
Constitutional: She is oriented to person, place, and time. She appears well-developed. No distress. HENT:  
Head: Normocephalic and atraumatic. Mouth/Throat: Oropharynx is clear and moist.  
Eyes: EOM are normal. No scleral icterus. Neck: No tracheal deviation present. Cardiovascular: Normal rate, regular rhythm, normal heart sounds and intact distal pulses. Pulmonary/Chest: Effort normal and breath sounds normal. No respiratory distress. Abdominal: Soft. She exhibits no distension. There is no tenderness. Musculoskeletal: Normal range of motion. She exhibits no edema. Neurological: She is alert and oriented to person, place, and time. No cranial nerve deficit. GCS eye subscore is 4. GCS verbal subscore is 5. GCS motor subscore is 6. Skin: Skin is warm and dry. Psychiatric: She has a normal mood and affect. Nursing note and vitals reviewed. MDM Number of Diagnoses or Management Options Diagnosis management comments: 43-year-old female presenting after a ground-level fall, mechanical cause, primary survey negative. Secondary survey notable for a 2 cmlaceration behind the  Left ear, and a 1 cm laceration between the tragus and the cartilaginous portion of the left ear. Bleeding controlled in both areas. Vital signs stable. Pursue CT of the head and neck given the patient's baseline dementia. Laceration repair. Wound Repair 
Date/Time: 4/25/2019 5:44 PM 
Performed by: attendingPreparation: skin prepped with ChloraPrep and skin prepped with Shur-Clens Location: left ear, left mastoid process. Wound length:2.5 cm or less Anesthesia: local infiltration Anesthesia: 
Local Anesthetic: lidocaine 1% with epinephrine Foreign bodies: no foreign bodies Irrigation solution: saline Irrigation method: syringe Debridement: none Skin closure: 5-0 nylon and 6-0 nylon Number of sutures: 4 Technique: simple Approximation: close Dressing: antibiotic ointment My total time at bedside, performing this procedure was 1-15 minutes. CT Results  (Last 48 hours) 04/25/19 1539  CT HEAD WO CONT Final result Impression:  IMPRESSION: No acute abnormality identified. Multiple infarcts are again noted. Narrative:  EXAM: CT HEAD WO CONT INDICATION: Ground level fall, struck left side COMPARISON: 2018. CONTRAST: None. TECHNIQUE: Unenhanced CT of the head was performed using 5 mm images. Brain and  
bone windows were generated. CT dose reduction was achieved through use of a  
standardized protocol tailored for this examination and automatic exposure  
control for dose modulation. FINDINGS:  
There is prominence of the ventricles and sulci diffusely. There are moderate  
changes small vessel disease periventricular white matter. There is an old right  
frontal infarction and old left occipital infarction unchanged. There is an old  
lacunar infarct in the left thalamus. There are small vessel ischemic changes in  
the mid ellis. No hemorrhage mass or acute infarction is identified. Bony structures are intact. Increased density right mastoid air cells is  
unchanged. 04/25/19 1539  CT SPINE CERV WO CONT Final result Impression:  IMPRESSION:  
No fracture or subluxation is identified. Narrative:  EXAM:  CT CERVICAL SPINE WITHOUT CONTRAST INDICATION: Neck pain after fall, can't clear by NEXUS. COMPARISON: None. CONTRAST:  None. TECHNIQUE: Multislice helical CT of the cervical spine was performed without  
intravenous contrast administration. Sagittal and coronal reconstructions were  
generated. CT dose reduction was achieved through use of a standardized  
protocol tailored for this examination and automatic exposure control for dose  
modulation. FINDINGS:  
   
There is slight scoliosis. There are mild degenerative changes mid lower  
cervical spine without evidence of central stenosis. There is no fracture or  
subluxation. The odontoid process is intact. The craniocervical junction is  
within normal limits. The prevertebral soft tissues are within normal limits. There is diffuse enlargement of the thyroid gland left greater than right with  
suspicion of nodules. PFT Results  (Last 48 hours) None Echo Results  (Last 48 hours) None CXR Results  (Last 48 hours) None VENOUS DOPPLER results  (Last 48 hours) None Signed By: Mendel Valero MD   
 April 25, 2019

## 2019-04-25 NOTE — ED NOTES
Patient discharged by MD. Results and discharge instructions reviewed with the patient by MD. Patient verbalizes understanding. Patient discharged, stable, in no acute distress. [facility] transporting back to [facility]

## 2019-04-25 NOTE — DISCHARGE INSTRUCTIONS

## 2019-04-25 NOTE — ED NOTES
Called report back to [facility] from [facility].

## 2019-06-02 ENCOUNTER — HOSPITAL ENCOUNTER (EMERGENCY)
Age: 79
Discharge: HOME | End: 2019-06-02
Attending: EMERGENCY MEDICINE
Payer: MEDICARE

## 2019-06-02 ENCOUNTER — APPOINTMENT (OUTPATIENT)
Dept: CT IMAGING | Age: 79
End: 2019-06-02
Attending: EMERGENCY MEDICINE
Payer: MEDICARE

## 2019-06-02 VITALS
DIASTOLIC BLOOD PRESSURE: 73 MMHG | OXYGEN SATURATION: 98 % | WEIGHT: 138.89 LBS | BODY MASS INDEX: 23.71 KG/M2 | TEMPERATURE: 98.4 F | SYSTOLIC BLOOD PRESSURE: 156 MMHG | RESPIRATION RATE: 12 BRPM | HEIGHT: 64 IN | HEART RATE: 64 BPM

## 2019-06-02 DIAGNOSIS — N30.90 CYSTITIS: Primary | ICD-10-CM

## 2019-06-02 DIAGNOSIS — K59.00 CONSTIPATION, UNSPECIFIED CONSTIPATION TYPE: ICD-10-CM

## 2019-06-02 DIAGNOSIS — K42.9 UMBILICAL HERNIA WITHOUT OBSTRUCTION AND WITHOUT GANGRENE: ICD-10-CM

## 2019-06-02 LAB
ALBUMIN SERPL-MCNC: 3 G/DL (ref 3.5–5)
ALBUMIN/GLOB SERPL: 0.8 {RATIO} (ref 1.1–2.2)
ALP SERPL-CCNC: 99 U/L (ref 45–117)
ALT SERPL-CCNC: 34 U/L (ref 12–78)
ANION GAP SERPL CALC-SCNC: 7 MMOL/L (ref 5–15)
APPEARANCE UR: ABNORMAL
AST SERPL-CCNC: 57 U/L (ref 15–37)
BACTERIA URNS QL MICRO: ABNORMAL /HPF
BASOPHILS # BLD: 0 K/UL (ref 0–0.1)
BASOPHILS NFR BLD: 0 % (ref 0–1)
BILIRUB SERPL-MCNC: 0.5 MG/DL (ref 0.2–1)
BILIRUB UR QL: NEGATIVE
BUN SERPL-MCNC: 18 MG/DL (ref 6–20)
BUN/CREAT SERPL: 20 (ref 12–20)
CALCIUM SERPL-MCNC: 8.7 MG/DL (ref 8.5–10.1)
CHLORIDE SERPL-SCNC: 104 MMOL/L (ref 97–108)
CO2 SERPL-SCNC: 30 MMOL/L (ref 21–32)
COLOR UR: ABNORMAL
CREAT SERPL-MCNC: 0.88 MG/DL (ref 0.55–1.02)
DIFFERENTIAL METHOD BLD: ABNORMAL
EOSINOPHIL # BLD: 0 K/UL (ref 0–0.4)
EOSINOPHIL NFR BLD: 0 % (ref 0–7)
EPITH CASTS URNS QL MICRO: ABNORMAL /LPF
ERYTHROCYTE [DISTWIDTH] IN BLOOD BY AUTOMATED COUNT: 15.7 % (ref 11.5–14.5)
GLOBULIN SER CALC-MCNC: 3.9 G/DL (ref 2–4)
GLUCOSE SERPL-MCNC: 112 MG/DL (ref 65–100)
GLUCOSE UR STRIP.AUTO-MCNC: NEGATIVE MG/DL
HCT VFR BLD AUTO: 36.4 % (ref 35–47)
HGB BLD-MCNC: 11.6 G/DL (ref 11.5–16)
HGB UR QL STRIP: NEGATIVE
IMM GRANULOCYTES # BLD AUTO: 0 K/UL (ref 0–0.04)
IMM GRANULOCYTES NFR BLD AUTO: 0 % (ref 0–0.5)
KETONES UR QL STRIP.AUTO: NEGATIVE MG/DL
LACTATE BLD-SCNC: 1.18 MMOL/L (ref 0.4–2)
LEUKOCYTE ESTERASE UR QL STRIP.AUTO: ABNORMAL
LIPASE SERPL-CCNC: 135 U/L (ref 73–393)
LYMPHOCYTES # BLD: 0.6 K/UL (ref 0.8–3.5)
LYMPHOCYTES NFR BLD: 13 % (ref 12–49)
MCH RBC QN AUTO: 28.8 PG (ref 26–34)
MCHC RBC AUTO-ENTMCNC: 31.9 G/DL (ref 30–36.5)
MCV RBC AUTO: 90.3 FL (ref 80–99)
MONOCYTES # BLD: 0.5 K/UL (ref 0–1)
MONOCYTES NFR BLD: 10 % (ref 5–13)
NEUTS SEG # BLD: 3.4 K/UL (ref 1.8–8)
NEUTS SEG NFR BLD: 77 % (ref 32–75)
NITRITE UR QL STRIP.AUTO: POSITIVE
NRBC # BLD: 0 K/UL (ref 0–0.01)
NRBC BLD-RTO: 0 PER 100 WBC
PH UR STRIP: 6 [PH] (ref 5–8)
PLATELET # BLD AUTO: 157 K/UL (ref 150–400)
PLATELET COMMENTS,PCOM: ABNORMAL
PMV BLD AUTO: 12.1 FL (ref 8.9–12.9)
POTASSIUM SERPL-SCNC: 4.1 MMOL/L (ref 3.5–5.1)
PROT SERPL-MCNC: 6.9 G/DL (ref 6.4–8.2)
PROT UR STRIP-MCNC: NEGATIVE MG/DL
RBC # BLD AUTO: 4.03 M/UL (ref 3.8–5.2)
RBC #/AREA URNS HPF: ABNORMAL /HPF (ref 0–5)
RBC MORPH BLD: ABNORMAL
RBC MORPH BLD: ABNORMAL
SODIUM SERPL-SCNC: 141 MMOL/L (ref 136–145)
SP GR UR REFRACTOMETRY: 1.01 (ref 1–1.03)
UROBILINOGEN UR QL STRIP.AUTO: 1 EU/DL (ref 0.2–1)
WBC # BLD AUTO: 4.5 K/UL (ref 3.6–11)
WBC URNS QL MICRO: ABNORMAL /HPF (ref 0–4)

## 2019-06-02 PROCEDURE — 83690 ASSAY OF LIPASE: CPT

## 2019-06-02 PROCEDURE — 77030011943

## 2019-06-02 PROCEDURE — 51701 INSERT BLADDER CATHETER: CPT

## 2019-06-02 PROCEDURE — 81001 URINALYSIS AUTO W/SCOPE: CPT

## 2019-06-02 PROCEDURE — 83605 ASSAY OF LACTIC ACID: CPT

## 2019-06-02 PROCEDURE — 85025 COMPLETE CBC W/AUTO DIFF WBC: CPT

## 2019-06-02 PROCEDURE — 87077 CULTURE AEROBIC IDENTIFY: CPT

## 2019-06-02 PROCEDURE — 99285 EMERGENCY DEPT VISIT HI MDM: CPT

## 2019-06-02 PROCEDURE — 74011250636 HC RX REV CODE- 250/636: Performed by: EMERGENCY MEDICINE

## 2019-06-02 PROCEDURE — 87186 SC STD MICRODIL/AGAR DIL: CPT

## 2019-06-02 PROCEDURE — 74011250637 HC RX REV CODE- 250/637: Performed by: EMERGENCY MEDICINE

## 2019-06-02 PROCEDURE — 99284 EMERGENCY DEPT VISIT MOD MDM: CPT

## 2019-06-02 PROCEDURE — 96374 THER/PROPH/DIAG INJ IV PUSH: CPT

## 2019-06-02 PROCEDURE — 74176 CT ABD & PELVIS W/O CONTRAST: CPT

## 2019-06-02 PROCEDURE — 36415 COLL VENOUS BLD VENIPUNCTURE: CPT

## 2019-06-02 PROCEDURE — 80053 COMPREHEN METABOLIC PANEL: CPT

## 2019-06-02 PROCEDURE — 87086 URINE CULTURE/COLONY COUNT: CPT

## 2019-06-02 RX ORDER — CEPHALEXIN 250 MG/1
500 CAPSULE ORAL
Status: COMPLETED | OUTPATIENT
Start: 2019-06-02 | End: 2019-06-02

## 2019-06-02 RX ORDER — ONDANSETRON 4 MG/1
4 TABLET, ORALLY DISINTEGRATING ORAL
Status: DISCONTINUED | OUTPATIENT
Start: 2019-06-02 | End: 2019-06-02 | Stop reason: HOSPADM

## 2019-06-02 RX ORDER — FENTANYL CITRATE 50 UG/ML
50 INJECTION, SOLUTION INTRAMUSCULAR; INTRAVENOUS ONCE
Status: COMPLETED | OUTPATIENT
Start: 2019-06-02 | End: 2019-06-02

## 2019-06-02 RX ORDER — POLYETHYLENE GLYCOL 3350 17 G/17G
17 POWDER, FOR SOLUTION ORAL DAILY
Qty: 30 EACH | Refills: 0 | Status: SHIPPED | OUTPATIENT
Start: 2019-06-02 | End: 2020-08-11

## 2019-06-02 RX ORDER — CEPHALEXIN 500 MG/1
500 CAPSULE ORAL 3 TIMES DAILY
Qty: 21 CAP | Refills: 0 | Status: SHIPPED | OUTPATIENT
Start: 2019-06-02 | End: 2019-06-09

## 2019-06-02 RX ORDER — BUMETANIDE 0.5 MG/1
0.5 TABLET ORAL
COMMUNITY
End: 2020-08-11

## 2019-06-02 RX ADMIN — CEPHALEXIN 500 MG: 250 CAPSULE ORAL at 19:05

## 2019-06-02 RX ADMIN — ONDANSETRON 4 MG: 4 TABLET, ORALLY DISINTEGRATING ORAL at 20:11

## 2019-06-02 RX ADMIN — FENTANYL CITRATE 50 MCG: 50 INJECTION, SOLUTION INTRAMUSCULAR; INTRAVENOUS at 17:48

## 2019-06-02 NOTE — ED NOTES
Attempted to call [facility] no answer. Called and spoke with patients daughter Margaret Nam and updated her on patients status.

## 2019-06-02 NOTE — ED PROVIDER NOTES
66 y.o. Female with a hx of a. Fib, CVA, DM, dementia, HTN, prior diverticulitis, and hx of umbillical hernia for \"quite a while\" presents to the ED noting that her hernia \"stuck out more than it normally does last night\" and her pain got worse overlying her umbilical hernia. She states that she always has some pain in this area when it comes out, but states that her pain is worse than it usually is. Notes pain 7/10. No nausea, vomiting, diarrhea, dysuria, urgency or frequency, no fever, chills. She has been able to eat and drink normally since the onset. No other medical complaints.             Past Medical History:   Diagnosis Date    [facility]     Atrial fibrillation (HCC)     Bladder cancer (HCC)     Constipation     CVA (cerebral vascular accident) ([facility]     Dementia     Diabetes ([facility]     Diverticulitis     DM (diabetes mellitus) ([facility]     Essential hypertension     Hemorrhoids     HTN (hypertension)     Hyperlipidemia        Past Surgical History:   Procedure Laterality Date    HX GI      HX HYSTERECTOMY      HX PACEMAKER PLACEMENT Left     HX TUBAL LIGATION      POLYPECTOMY- HOT BX           Family History:   Problem Relation Age of Onset    Hypertension Mother     Stroke Mother     Liver Disease Father     Hypertension Father        Social History     Socioeconomic History    Marital status: SINGLE     Spouse name: Not on file    Number of children: Not on file    Years of education: Not on file    Highest education level: Not on file   Occupational History    Not on file   Social Needs    Financial resource strain: Not on file    Food insecurity:     Worry: Not on file     Inability: Not on file    Transportation needs:     Medical: Not on file     Non-medical: Not on file   Tobacco Use    Smoking status: Never Smoker    Smokeless tobacco: Never Used   Substance and Sexual Activity    Alcohol use: No    Drug use: No    Sexual activity: Never   Lifestyle    Physical activity:     Days per week: Not on file     Minutes per session: Not on file    Stress: Not on file   Relationships    Social connections:     Talks on phone: Not on file     Gets together: Not on file     Attends Buddhist service: Not on file     Active member of club or organization: Not on file     Attends meetings of clubs or organizations: Not on file     Relationship status: Not on file    Intimate partner violence:     Fear of current or ex partner: Not on file     Emotionally abused: Not on file     Physically abused: Not on file     Forced sexual activity: Not on file   Other Topics Concern    Not on file   Social History Narrative    ** Merged History Encounter **              ALLERGIES: Erythromycin; Flagyl [metronidazole]; Macrobid [nitrofurantoin monohyd/m-cryst]; Pcn [penicillins]; and Sulfa (sulfonamide antibiotics)    Review of Systems   Constitutional: Positive for activity change. Negative for appetite change, chills and fever. HENT: Negative for congestion, rhinorrhea, sinus pressure, sneezing and sore throat. Eyes: Negative for photophobia and visual disturbance. Respiratory: Negative for cough and shortness of breath. Cardiovascular: Negative for chest pain. Gastrointestinal: Positive for abdominal distention (hernia, umbilical) and abdominal pain. Negative for blood in stool, constipation, diarrhea, nausea and vomiting. Genitourinary: Negative for difficulty urinating, dysuria, flank pain, frequency, hematuria, menstrual problem, urgency, vaginal bleeding and vaginal discharge. Musculoskeletal: Negative for arthralgias, back pain, myalgias and neck pain. Skin: Negative for rash and wound. Neurological: Negative for syncope, weakness, numbness and headaches. Psychiatric/Behavioral: Negative for self-injury and suicidal ideas. All other systems reviewed and are negative.       Vitals:    06/02/19 1729   BP: 145/66   Pulse: 64   Resp: 12   Temp: 98.4 °F (36.9 °C)   SpO2: 97%   Weight: 63 kg (138 lb 14.2 oz)   Height: 5' 4\" (1.626 m)            Physical Exam   Constitutional: She is oriented to person, place, and time. She appears well-developed and well-nourished. No distress. HENT:   Head: Normocephalic and atraumatic. Nose: Nose normal.   Mouth/Throat: Oropharynx is clear and moist.   Eyes: Pupils are equal, round, and reactive to light. Conjunctivae and EOM are normal.   Neck: Neck supple. Cardiovascular: Normal rate, regular rhythm, normal heart sounds and intact distal pulses. Pulmonary/Chest: Effort normal and breath sounds normal.   Abdominal: Soft. She exhibits no distension. There is tenderness in the periumbilical area. There is no rigidity, no rebound, no guarding, no CVA tenderness, no tenderness at McBurney's point and negative Freeman's sign. A hernia (umbilical, easily reducible, but tender to palpation, no overlying redness, or ecchymosis. ) is present. Musculoskeletal: She exhibits no edema or tenderness. Neurological: She is alert and oriented to person, place, and time. She has normal strength. No cranial nerve deficit or sensory deficit. Coordination normal. GCS eye subscore is 4. GCS verbal subscore is 5. GCS motor subscore is 6. Skin: Skin is warm and dry. No rash noted. She is not diaphoretic. Nursing note and vitals reviewed. MDM    66 y.o. female presents with acute on chronic umbilical hernia pain. Feels easily reducible on exam.    Labs drawn and returned reassuringly with normal lactate, no leukocytosis normal Hg, normal renal fxn. CT abd plv shows no evidence of incarceration or obstruction. Moderate constipation noted. Rx'd miralax. UA shows evidence of UTI. Sent for culture. Started on keflex in the ED and rx'd course of the same. Feel that this and her constipation are likely the causes of her lower abdominal discomfort. Rec'd PCP f/u. Return precautions were given for worsening or concerns. Procedures

## 2019-06-02 NOTE — ED TRIAGE NOTES
Pt brought in from [facility] for abd pain around her umbilical hernia since this morning.

## 2019-06-03 NOTE — ED NOTES
Patient verbalized to EMS she was feeling nauseated. Provider aware. Verbal order received for ODT zofran 4mg.

## 2019-06-06 LAB
BACTERIA SPEC CULT: ABNORMAL
CC UR VC: ABNORMAL
SERVICE CMNT-IMP: ABNORMAL

## 2020-08-11 ENCOUNTER — OFFICE VISIT (OUTPATIENT)
Dept: ENDOCRINOLOGY | Age: 80
End: 2020-08-11
Payer: MEDICARE

## 2020-08-11 VITALS
OXYGEN SATURATION: 97 % | HEIGHT: 64 IN | DIASTOLIC BLOOD PRESSURE: 60 MMHG | RESPIRATION RATE: 16 BRPM | HEART RATE: 61 BPM | WEIGHT: 134 LBS | SYSTOLIC BLOOD PRESSURE: 122 MMHG | TEMPERATURE: 98.2 F | BODY MASS INDEX: 22.88 KG/M2

## 2020-08-11 DIAGNOSIS — E05.80 HYPERTHYROIDISM SECONDARY TO AMIODARONE: Primary | ICD-10-CM

## 2020-08-11 DIAGNOSIS — E04.2 MULTINODULAR GOITER: ICD-10-CM

## 2020-08-11 DIAGNOSIS — T46.2X5A HYPERTHYROIDISM SECONDARY TO AMIODARONE: Primary | ICD-10-CM

## 2020-08-11 PROCEDURE — G8536 NO DOC ELDER MAL SCRN: HCPCS | Performed by: INTERNAL MEDICINE

## 2020-08-11 PROCEDURE — 99204 OFFICE O/P NEW MOD 45 MIN: CPT | Performed by: INTERNAL MEDICINE

## 2020-08-11 PROCEDURE — G8754 DIAS BP LESS 90: HCPCS | Performed by: INTERNAL MEDICINE

## 2020-08-11 PROCEDURE — G8432 DEP SCR NOT DOC, RNG: HCPCS | Performed by: INTERNAL MEDICINE

## 2020-08-11 PROCEDURE — G8400 PT W/DXA NO RESULTS DOC: HCPCS | Performed by: INTERNAL MEDICINE

## 2020-08-11 PROCEDURE — 1090F PRES/ABSN URINE INCON ASSESS: CPT | Performed by: INTERNAL MEDICINE

## 2020-08-11 PROCEDURE — G8752 SYS BP LESS 140: HCPCS | Performed by: INTERNAL MEDICINE

## 2020-08-11 PROCEDURE — G8427 DOCREV CUR MEDS BY ELIG CLIN: HCPCS | Performed by: INTERNAL MEDICINE

## 2020-08-11 PROCEDURE — 1101F PT FALLS ASSESS-DOCD LE1/YR: CPT | Performed by: INTERNAL MEDICINE

## 2020-08-11 PROCEDURE — G8420 CALC BMI NORM PARAMETERS: HCPCS | Performed by: INTERNAL MEDICINE

## 2020-08-11 RX ORDER — ONDANSETRON 4 MG/1
4 TABLET, ORALLY DISINTEGRATING ORAL
COMMUNITY
End: 2022-03-06

## 2020-08-11 RX ORDER — TROSPIUM CHLORIDE ER 60 MG/1
60 CAPSULE ORAL
COMMUNITY
End: 2022-03-06

## 2020-08-11 RX ORDER — METHIMAZOLE 5 MG/1
2.5 TABLET ORAL DAILY
Qty: 45 TAB | Refills: 3 | Status: SHIPPED | OUTPATIENT
Start: 2020-08-11 | End: 2020-10-15 | Stop reason: SDUPTHER

## 2020-08-11 NOTE — LETTER
8/15/20 Patient: Jonathon Sarabia YOB: 1940 Date of Visit: 8/11/2020 Alejandra Keenan MD 
[address] [address] VIA Facsimile: [phone] Dear Alejandra Keenan MD, Thank you for referring Ms. Sissy Gilford to [facility] for evaluation. My notes for this consultation are attached. If you have questions, please do not hesitate to call me. I look forward to following your patient along with you. Sincerely, Jodie Canales MD

## 2020-08-11 NOTE — PROGRESS NOTES
Cornell Chávez is a [de-identified] y.o. female here for   Chief Complaint   Patient presents with    Thyroid Problem     LV 2016       1. Have you been to the ER, urgent care clinic since your last visit? Hospitalized since your last visit? -no    2. Have you seen or consulted any other health care providers outside of the [address] [place] [address] since your last visit? Include any pap smears or colon screening. -PCP    rx go to [facility] at   [facility]  [address] [facility] [address]  Fax: [phone]    Order placed for pt per verbal order with read back from Dr. Anisha Damon 08/11/20

## 2020-08-11 NOTE — PROGRESS NOTES
History of present illness    Coby Carlson is a [de-identified] y.o. female is here for reestablishment of care. She was seen by me in 2016    Amiodarone induced thyrotoxicosis Type 1 - 2012  MNG with dominant nodules in the left lobe -   Hyperfunctioning nodule in the left lower pole, suppressing the   remainder of the gland -uptake 6%, patient was on amiodarone which could have decreased uptake  MMI  since 1/2013  On Amiodarone for A fib since 2011  She has bladder cancer, complains of frequency    she has recurrent  Afib, failed cardioversion and medication was changed. She has been off methimazole, she is in an assisted living in [place]  There are a lot of medication changes. She has not followed up with oncologist either  Reestablishing care with the doctors now    Weight has been fluctuating    Complains of weakness, fatigue, according to caretaker she has memory issues        No history of known radiation exposure. Daughter has  thyroid disease. No family hx of thyroid cancer. Past Medical History:   Diagnosis Date    [facility]     Atrial fibrillation (HCC)     Bladder cancer (HCC)     Constipation     CVA [facility]cerebral vascular accident) ([facility]     Dementia     Diabetes ([facility]     Diverticulitis     DM (diabetes mellitus) ([facility]     Essential hypertension     Hemorrhoids     HTN (hypertension)     Hyperlipidemia        Current Outpatient Medications   Medication Sig    mirabegron ER (Myrbetriq) 50 mg ER tablet Take 50 mg by mouth daily.  trospium (SANCTURA XL) 60 mg capsule Take 60 mg by mouth Daily (before breakfast).  ondansetron (ZOFRAN ODT) 4 mg disintegrating tablet Take 4 mg by mouth every eight (8) hours as needed for Nausea or Vomiting.  pravastatin (PRAVACHOL) 40 mg tablet Take 80 mg by mouth daily.  acetaminophen (TYLENOL) 650 mg TbER Take 650 mg by mouth every eight (8) hours.  losartan (COZAAR) 50 mg tablet Take 50 mg by mouth daily.     amiodarone (CORDARONE) 200 mg tablet Take 200 mg by mouth daily.  methIMAzole (TAPAZOLE) 5 mg tablet Take 5 mg by mouth daily. No current facility-administered medications for this visit. Review of Systems:  - Review of all systems negative other than mentioned in HPI  -     Physical Examination:  - Blood pressure 122/60, pulse 61, temperature 98.2 °F (36.8 °C), temperature source Oral, resp. rate 16, height 5' 4\" (1.626 m), weight 134 lb (60.8 kg), SpO2 97 %. Body mass index is 23 kg/m². - General: pleasant, no distress, good eye contact  - HEENT: no exopthalmos, no periorbital edema, no scleral/conjunctival injection, EOMI, no lid lag or stare  - Neck: supple, thyromegaly twice the normal size  - Cardiovascular: regular, normal rate, normal S1 and S2, + murmur  - Respiratory: clear to auscultation bilaterally  - Gastrointestinal: soft, nontender, nondistended,BS +  - Musculoskeletal:  no tremors, no edema  - Neurological: alert and oriented   - Psychiatric: normal mood and affect  - Skin - normal turgor    Data Reviewed:     1/2013   A thyroid uptake and scan was performed after administration of 247 ? Ci of   I-123. Thyroid uptake is calculated at 6% (normal is 10-30%). The thyroid   scan demonstrates a hyperfunctioning nodule in the left lower pole which   suppresses the remainder of the gland. A thyroid ultrasound demonstrates that the right lobe measures 4.6x1. 9x1.6   cm. The isthmus measures 1.1-1.3 cm. There is a 1.6x1. 1x1.4 cm nodule in the   left isthmus. The left lobe measures 6.3x2. 1x2.2 cm. In the upper pole,   there is a 1.5x1. 2x1.9 cm nodule. In the lower left pole, there is a   1.8x1.7x2.0 cm solid and cystic nodule. IMPRESSION:    1. Hyperfunctioning nodule in the left lower pole, suppressing the   remainder of the gland. The thyroid uptake is low but this may be due to the   patient's amiodarone therapy.  If the patient is clinically hyperthyroid, we   would be happy to treat the patient with radioactive iodine. 2. Bilateral thyroid nodules. Biopsy of the left isthmus nodule and the left   upper pole nodules is recommended. 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 FNA of the isthmus nodule 1/59 - follicular cells,hurthle cell ,no malignant cells, left upper lobe nodule - benign 4/13. Ultrasound of the thyroid April 2014 should increase in the size of the left upper lobe nodule, US 1/15 left thyroid nodule is now 3 cm ,FNA 7/15.  US 2/2016 - increase in left upper lobe nodule   Last for follow-up  He has multiple medical issues  Resume methimazole        Multinodular Goiter - no compressive symptoms. FNA of hypofunctioning nodules benign,US showed increase in the size of the left upper lobe nodule and rest of the nodules have not changed. Nodules increasing in size, prior FNAs negative  She has underlying bladder cancer, will focus on symptomatic treatment for multinodular goiter  She has no compressive symptoms  Only if she has compressive symptoms will order thyroid ultrasound        HTN -  Continue the same therapy        A fib - managed by Dr Elena Douglas    Thank you for allowing me to participate in the care of this patient.     Glo Pacheco MD    Patient/caregiver verbalized understanding

## 2020-08-11 NOTE — LETTER
8/15/2020 7:17 PM 
 
Patient:  Adrianna Gastelum YOB: 1940 Date of Visit: 8/11/2020 Dear No Recipients: Thank you for referring Ms. Akil Gonzalez to me for evaluation/treatment. Below are the relevant portions of my assessment and plan of care. If you have questions, please do not hesitate to call me. I look forward to following Ms. Miguel A Patel along with you. Sincerely, Monica Watson MD

## 2020-10-13 ENCOUNTER — OFFICE VISIT (OUTPATIENT)
Dept: ENDOCRINOLOGY | Age: 80
End: 2020-10-13
Payer: MEDICARE

## 2020-10-13 VITALS
OXYGEN SATURATION: 100 % | SYSTOLIC BLOOD PRESSURE: 134 MMHG | HEIGHT: 64 IN | WEIGHT: 131 LBS | TEMPERATURE: 97.8 F | HEART RATE: 61 BPM | DIASTOLIC BLOOD PRESSURE: 55 MMHG | BODY MASS INDEX: 22.36 KG/M2 | RESPIRATION RATE: 16 BRPM

## 2020-10-13 DIAGNOSIS — E05.80 HYPERTHYROIDISM SECONDARY TO AMIODARONE: Primary | ICD-10-CM

## 2020-10-13 DIAGNOSIS — T46.2X5A HYPERTHYROIDISM SECONDARY TO AMIODARONE: Primary | ICD-10-CM

## 2020-10-13 DIAGNOSIS — E04.2 MULTINODULAR GOITER: ICD-10-CM

## 2020-10-13 DIAGNOSIS — E05.90 SUBCLINICAL HYPERTHYROIDISM: ICD-10-CM

## 2020-10-13 LAB
CREATININE, EXTERNAL: 0.99
T4 FREE SERPL-MCNC: 1.2 NG/DL (ref 0.8–1.5)
TSH SERPL DL<=0.05 MIU/L-ACNC: 0.02 UIU/ML (ref 0.36–3.74)

## 2020-10-13 PROCEDURE — G8536 NO DOC ELDER MAL SCRN: HCPCS | Performed by: INTERNAL MEDICINE

## 2020-10-13 PROCEDURE — 1101F PT FALLS ASSESS-DOCD LE1/YR: CPT | Performed by: INTERNAL MEDICINE

## 2020-10-13 PROCEDURE — G8752 SYS BP LESS 140: HCPCS | Performed by: INTERNAL MEDICINE

## 2020-10-13 PROCEDURE — G8400 PT W/DXA NO RESULTS DOC: HCPCS | Performed by: INTERNAL MEDICINE

## 2020-10-13 PROCEDURE — G8754 DIAS BP LESS 90: HCPCS | Performed by: INTERNAL MEDICINE

## 2020-10-13 PROCEDURE — 1090F PRES/ABSN URINE INCON ASSESS: CPT | Performed by: INTERNAL MEDICINE

## 2020-10-13 PROCEDURE — G8432 DEP SCR NOT DOC, RNG: HCPCS | Performed by: INTERNAL MEDICINE

## 2020-10-13 PROCEDURE — 99214 OFFICE O/P EST MOD 30 MIN: CPT | Performed by: INTERNAL MEDICINE

## 2020-10-13 PROCEDURE — G8427 DOCREV CUR MEDS BY ELIG CLIN: HCPCS | Performed by: INTERNAL MEDICINE

## 2020-10-13 PROCEDURE — G8420 CALC BMI NORM PARAMETERS: HCPCS | Performed by: INTERNAL MEDICINE

## 2020-10-13 NOTE — PROGRESS NOTES
Rito Mahoney is a [de-identified] y.o. female here for   Chief Complaint   Patient presents with    Thyroid Problem       1. Have you been to the ER, urgent care clinic since your last visit? Hospitalized since your last visit? -no    2. Have you seen or consulted any other health care providers outside of the [address] [place] [address] since your last visit? Include any pap smears or colon screening. -PCP      Having cataract surgery on 10/29/20    Order placed for pt per verbal order with read back from Dr. Mercy Rashid 10/13/20

## 2020-10-13 NOTE — PROGRESS NOTES
History of present illness    Laney Isaac is a [de-identified] y.o. female is here for follow-up     amiodarone induced thyrotoxicosis Type 1 - 2012  MNG with dominant nodules in the left lobe -   Hyperfunctioning nodule in the left lower pole, suppressing the   remainder of the gland -uptake 6%, patient was on amiodarone which could have decreased uptake  MMI  since 1/2013  On Amiodarone for A fib since 2011  She has bladder cancer    she has recurrent  Afib,    She is in the assisted living at [facility], on methimazole  No dysphagia    Weight has been fluctuating    Complains of weakness, fatigue, according to caretaker she has memory issues        No history of known radiation exposure. Daughter has  thyroid disease. No family hx of thyroid cancer. Past Medical History:   Diagnosis Date    Atrial fibrillation ([address]     Atrial fibrillation (HCC)     Bladder cancer (HCC)     Constipation     [facility]     D[facility]     Diabetes [facility]     Diverticulitis     DM [facility]diabetes mellitus) [facility] [facility]     Essential hypertension     Hemorrhoids     HTN (hypertension)     Hyperlipidemia        Current Outpatient Medications   Medication Sig    mirabegron ER (Myrbetriq) 50 mg ER tablet Take 50 mg by mouth daily.  trospium (SANCTURA XL) 60 mg capsule Take 60 mg by mouth Daily (before breakfast).  ondansetron (ZOFRAN ODT) 4 mg disintegrating tablet Take 4 mg by mouth every eight (8) hours as needed for Nausea or Vomiting.  methIMAzole (TAPAZOLE) 5 mg tablet Take 0.5 Tabs by mouth daily.  pravastatin (PRAVACHOL) 80 mg tablet Take 80 mg by mouth nightly.  acetaminophen (TYLENOL) 650 mg TbER Take 650 mg by mouth every eight (8) hours.  losartan (COZAAR) 50 mg tablet Take 50 mg by mouth daily.  amiodarone (CORDARONE) 200 mg tablet Take 200 mg by mouth daily. No current facility-administered medications for this visit.           Review of Systems:  - Review of all systems negative other than mentioned in HPI  -     Physical Examination:  - Blood pressure (!) 134/55, pulse 61, temperature 97.8 °F (36.6 °C), temperature source Oral, resp. rate 16, height 5' 4\" (1.626 m), weight 131 lb (59.4 kg), SpO2 100 %. Body mass index is 22.49 kg/m². - General: pleasant, no distress, good eye contact  - HEENT: no exopthalmos, no periorbital edema, no scleral/conjunctival injection, EOMI, no lid lag or stare  - Neck: supple, thyromegaly twice the normal size  - Cardiovascular: regular, normal rate, normal S1 and S2, + murmur  - Respiratory: clear to auscultation bilaterally  - Gastrointestinal: soft, nontender, nondistended,BS +  - Musculoskeletal:  no tremors, no edema  - Neurological: alert and oriented   - Psychiatric: normal mood and affect  - Skin - normal turgor    Data Reviewed:     1/2013   A thyroid uptake and scan was performed after administration of 247 ? Ci of   I-123. Thyroid uptake is calculated at 6% (normal is 10-30%). The thyroid   scan demonstrates a hyperfunctioning nodule in the left lower pole which   suppresses the remainder of the gland. A thyroid ultrasound demonstrates that the right lobe measures 4.6x1. 9x1.6   cm. The isthmus measures 1.1-1.3 cm. There is a 1.6x1. 1x1.4 cm nodule in the   left isthmus. The left lobe measures 6.3x2. 1x2.2 cm. In the upper pole,   there is a 1.5x1. 2x1.9 cm nodule. In the lower left pole, there is a   1.8x1.7x2.0 cm solid and cystic nodule. IMPRESSION:    1. Hyperfunctioning nodule in the left lower pole, suppressing the   remainder of the gland. The thyroid uptake is low but this may be due to the   patient's amiodarone therapy. If the patient is clinically hyperthyroid, we   would be happy to treat the patient with radioactive iodine. 2. Bilateral thyroid nodules. Biopsy of the left isthmus nodule and the left   upper pole nodules is recommended.        Lab Results   Component Value Date/Time    TSH 6.58 (H) 10/18/2018 01:21 AM T4, Free 1.4 11/15/2016 06:07 PM      11/2012  TSH 0.050  T4 7.4   T3 uptake 26  FTI 1.9     6/2012 CBC, BMP nl ,TSH - 0.390    [x] Reviewed labs    Assessment/Plan:     Subclinical hyperthyroidism - Amiodarone induced thyrotoxicosis(A I T) Type 1   Thyroid uptake  6% (nl 10-30%),scan demonstrated a hyperfunctioning nodule in the left lower pole with 2 hypofunctioning nodules one at the junction of isthmus and left lobe and the other in the left upper lobe. FNA of the isthmus nodule 5/06 - follicular cells,hurthle cell ,no malignant cells, left upper lobe nodule - benign 4/13. Ultrasound of the thyroid April 2014 should increase in the size of the left upper lobe nodule, US 1/15 left thyroid nodule is now 3 cm ,FNA 7/15.  US 2/2016 - increase in left upper lobe nodule   Methimazole  Ultrasound thyroid ordered          Multinodular Goiter - no compressive symptoms. FNA of hypofunctioning nodules benign,US showed increase in the size of the left upper lobe nodule and rest of the nodules have not changed. Nodules increasing in size, prior FNAs negative  She has underlying bladder cancer, will focus on symptomatic treatment for multinodular goiter  She has no compressive symptoms      HTN -  Continue the same therapy        A fib - managed by Dr Triston Higginbotham    Thank you for allowing me to participate in the care of this patient.     Pollo Toscano MD    Patient/caregiver verbalized understanding

## 2020-10-13 NOTE — PATIENT INSTRUCTIONS
I have ordered scan/test and if you do not hear from the hospital in 3- 4 business days  please call the number [phone] to speak with the scheduling team directly.

## 2020-10-15 ENCOUNTER — TELEPHONE (OUTPATIENT)
Dept: ENDOCRINOLOGY | Age: 80
End: 2020-10-15

## 2020-10-15 DIAGNOSIS — T46.2X5A HYPERTHYROIDISM SECONDARY TO AMIODARONE: ICD-10-CM

## 2020-10-15 DIAGNOSIS — E05.80 HYPERTHYROIDISM SECONDARY TO AMIODARONE: ICD-10-CM

## 2020-10-15 DIAGNOSIS — E04.2 MULTINODULAR GOITER: ICD-10-CM

## 2020-10-15 RX ORDER — METHIMAZOLE 5 MG/1
5 TABLET ORAL DAILY
Qty: 90 TAB | Refills: 3 | Status: SHIPPED | OUTPATIENT
Start: 2020-10-15

## 2020-10-15 NOTE — TELEPHONE ENCOUNTER
Informed Zeenat Chang, on HIPAA, of result note. She asked for new rx to be faxed to assisted Living at [phone]. No further questions or concerns at this time. Order placed for pt per verbal order with read back from Dr. Stout Daily 10/15/20  Faxed to provided fax number.

## 2020-10-15 NOTE — TELEPHONE ENCOUNTER
----- Message from Yariel Cam MD sent at 10/14/2020  9:07 AM EDT -----  Increase Methimazole to 5 mg daily instead of half a tablet

## 2020-10-30 ENCOUNTER — HOSPITAL ENCOUNTER (OUTPATIENT)
Dept: ULTRASOUND IMAGING | Age: 80
Discharge: HOME | End: 2020-10-30
Attending: INTERNAL MEDICINE
Payer: MEDICARE

## 2020-10-30 DIAGNOSIS — E04.2 MULTINODULAR GOITER: ICD-10-CM

## 2020-10-30 PROCEDURE — 76536 US EXAM OF HEAD AND NECK: CPT

## 2020-11-02 DIAGNOSIS — E05.80 HYPERTHYROIDISM SECONDARY TO AMIODARONE: ICD-10-CM

## 2020-11-02 DIAGNOSIS — T46.2X5A HYPERTHYROIDISM SECONDARY TO AMIODARONE: ICD-10-CM

## 2020-11-02 DIAGNOSIS — E04.2 MULTINODULAR GOITER: ICD-10-CM

## 2020-11-08 NOTE — PROGRESS NOTES
FNA of some of the nodules benign  Await biochemical euthyroidism, has underlying bladder cancer and poor quality of life.   No FNA planned now

## 2021-04-14 ENCOUNTER — OFFICE VISIT (OUTPATIENT)
Dept: ENDOCRINOLOGY | Age: 81
End: 2021-04-14
Payer: MEDICARE

## 2021-04-14 VITALS
WEIGHT: 134.3 LBS | HEIGHT: 64 IN | OXYGEN SATURATION: 99 % | DIASTOLIC BLOOD PRESSURE: 57 MMHG | TEMPERATURE: 98.1 F | HEART RATE: 61 BPM | SYSTOLIC BLOOD PRESSURE: 140 MMHG | RESPIRATION RATE: 14 BRPM | BODY MASS INDEX: 22.93 KG/M2

## 2021-04-14 DIAGNOSIS — T46.2X5A HYPERTHYROIDISM SECONDARY TO AMIODARONE: Primary | ICD-10-CM

## 2021-04-14 DIAGNOSIS — E04.2 MULTINODULAR GOITER: ICD-10-CM

## 2021-04-14 DIAGNOSIS — E05.80 HYPERTHYROIDISM SECONDARY TO AMIODARONE: Primary | ICD-10-CM

## 2021-04-14 LAB
T4 FREE SERPL-MCNC: 0.9 NG/DL (ref 0.8–1.5)
TSH SERPL DL<=0.05 MIU/L-ACNC: 2.41 UIU/ML (ref 0.36–3.74)

## 2021-04-14 PROCEDURE — 1101F PT FALLS ASSESS-DOCD LE1/YR: CPT | Performed by: INTERNAL MEDICINE

## 2021-04-14 PROCEDURE — 99214 OFFICE O/P EST MOD 30 MIN: CPT | Performed by: INTERNAL MEDICINE

## 2021-04-14 PROCEDURE — G8536 NO DOC ELDER MAL SCRN: HCPCS | Performed by: INTERNAL MEDICINE

## 2021-04-14 PROCEDURE — G8400 PT W/DXA NO RESULTS DOC: HCPCS | Performed by: INTERNAL MEDICINE

## 2021-04-14 PROCEDURE — G8420 CALC BMI NORM PARAMETERS: HCPCS | Performed by: INTERNAL MEDICINE

## 2021-04-14 PROCEDURE — G8754 DIAS BP LESS 90: HCPCS | Performed by: INTERNAL MEDICINE

## 2021-04-14 PROCEDURE — G8753 SYS BP > OR = 140: HCPCS | Performed by: INTERNAL MEDICINE

## 2021-04-14 PROCEDURE — 1090F PRES/ABSN URINE INCON ASSESS: CPT | Performed by: INTERNAL MEDICINE

## 2021-04-14 PROCEDURE — G8432 DEP SCR NOT DOC, RNG: HCPCS | Performed by: INTERNAL MEDICINE

## 2021-04-14 PROCEDURE — G8427 DOCREV CUR MEDS BY ELIG CLIN: HCPCS | Performed by: INTERNAL MEDICINE

## 2021-04-14 NOTE — PROGRESS NOTES
Trista Montiel is a [de-identified] y.o. female here for   Chief Complaint   Patient presents with    Thyroid Problem       1. Have you been to the ER, urgent care clinic since your last visit? Hospitalized since your last visit? - no    2. Have you seen or consulted any other health care providers outside of the [address] [place] [address] since your last visit?   Include any pap smears or colon screening.-no

## 2021-04-14 NOTE — LETTER
4/17/2021 Patient: Omero Mccain YOB: 1940 Date of Visit: 4/14/2021 Praneeth Alberts MD 
[address] [facility] [id number] Via Fax: [phone] Dear Praneeth Alberts MD, Thank you for referring Ms. Kathy Corona to [facility] for evaluation. My notes for this consultation are attached. If you have questions, please do not hesitate to call me. I look forward to following your patient along with you. Sincerely, Zenaida Jones MD

## 2021-04-14 NOTE — PROGRESS NOTES
History of present illness    Haylee Whitney is a [de-identified] y.o. female is here for follow-up     Amiodarone induced thyrotoxicosis Type 1 diagnosed in- 2012  MNG with dominant nodules in the left lobe -   Hyperfunctioning nodule in the left lower pole, suppressing the   remainder of the gland -uptake 6%, patient was on amiodarone which could have decreased uptake  MMI  since 1/2013  On Amiodarone for A fib since 2011  She has bladder cancer    she has recurrent  Afib,    She is in the assisted living at [facility], on methimazole  No dysphagia      Complains of weakness, fatigue, according to caretaker she has memory issues        No history of known radiation exposure. Daughter has  thyroid disease. No family hx of thyroid cancer. Past Medical History:   Diagnosis Date    Atrial fibrillation (HCC)     Atrial fibrillation (HCC)     Bladder cancer (HCC)     Constipation     [facility]     Dementia [facility]     Diabetes ([facility]     Diverticulitis     DM (diabetes mellitus) ([facility]     Essential hypertension     Hemorrhoids     HTN (hypertension)     Hyperlipidemia        Current Outpatient Medications   Medication Sig    methIMAzole (TAPAZOLE) 5 mg tablet Take 1 Tab by mouth daily. Stop 2.5mg    mirabegron ER (Myrbetriq) 50 mg ER tablet Take 50 mg by mouth daily.  trospium (SANCTURA XL) 60 mg capsule Take 60 mg by mouth Daily (before breakfast).  ondansetron (ZOFRAN ODT) 4 mg disintegrating tablet Take 4 mg by mouth every eight (8) hours as needed for Nausea or Vomiting.  pravastatin (PRAVACHOL) 80 mg tablet Take 80 mg by mouth nightly.  acetaminophen (TYLENOL) 650 mg TbER Take 650 mg by mouth every eight (8) hours.  losartan (COZAAR) 50 mg tablet Take 50 mg by mouth daily.  amiodarone (CORDARONE) 200 mg tablet Take 200 mg by mouth daily. No current facility-administered medications for this visit.           Review of Systems:  - Per HPI  -     Physical Examination:  - Blood pressure (!) 140/57, pulse 61, temperature 98.1 °F (36.7 °C), temperature source Oral, resp. rate 14, height 5' 4\" (1.626 m), weight 134 lb 4.8 oz (60.9 kg), SpO2 99 %. Body mass index is 23.05 kg/m². - General: pleasant, no distress, good eye contact  - HEENT: no exopthalmos, no periorbital edema, no scleral/conjunctival injection, EOMI, no lid lag or stare  - Neck: supple, thyromegaly twice the normal size  - Cardiovascular: regular, normal rate, normal S1 and S2, + murmur  - Respiratory: clear to auscultation bilaterally  -   - Musculoskeletal:  no tremors, no edema  - Neurological: alert and oriented   - Psychiatric: normal mood and affect  - Skin - normal turgor    Data Reviewed:     1/2013   A thyroid uptake and scan was performed after administration of 247 ? Ci of   I-123. Thyroid uptake is calculated at 6% (normal is 10-30%). The thyroid   scan demonstrates a hyperfunctioning nodule in the left lower pole which   suppresses the remainder of the gland. A thyroid ultrasound demonstrates that the right lobe measures 4.6x1. 9x1.6   cm. The isthmus measures 1.1-1.3 cm. There is a 1.6x1. 1x1.4 cm nodule in the   left isthmus. The left lobe measures 6.3x2. 1x2.2 cm. In the upper pole,   there is a 1.5x1. 2x1.9 cm nodule. In the lower left pole, there is a   1.8x1.7x2.0 cm solid and cystic nodule. IMPRESSION:    1. Hyperfunctioning nodule in the left lower pole, suppressing the   remainder of the gland. The thyroid uptake is low but this may be due to the   patient's amiodarone therapy. If the patient is clinically hyperthyroid, we   would be happy to treat the patient with radioactive iodine. 2. Bilateral thyroid nodules. Biopsy of the left isthmus nodule and the left   upper pole nodules is recommended.        Lab Results   Component Value Date/Time    TSH 0.02 (L) 10/13/2020 02:27 PM    T4, Free 1.2 10/13/2020 02:27 PM      11/2012  TSH 0.050  T4 7.4   T3 uptake 26  FTI 1.9 6/2012 CBC, BMP nl ,TSH - 0.390    [x] Reviewed labs    Assessment/Plan:     Subclinical hyperthyroidism - Amiodarone induced thyrotoxicosis(A I T) Type 1   Thyroid uptake  6% (nl 10-30%),scan demonstrated a hyperfunctioning nodule in the left lower pole with 2 hypofunctioning nodules one at the junction of isthmus and left lobe and the other in the left upper lobe. FNA of the isthmus nodule 7/93 - follicular cells,hurthle cell ,no malignant cells, left upper lobe nodule - benign 4/13. Ultrasound of the thyroid April 2014 should increase in the size of the left upper lobe nodule, US 1/15 left thyroid nodule is now 3 cm ,FNA 7/15.  US 2/2016 - increase in left upper lobe nodule   Methimazole, on amiodarone so radioactive iodine therapy will not help  Ultrasound thyroid October 2020: Right thyroid lobe 3 nodules largest 1.6  Left thyroid lobe: 3 cm, 2.4 cm, 1.1 cm  Isthmus 2.2 cm          Multinodular Goiter - no compressive symptoms. FNA of hypofunctioning nodules benign,US showed increase in the size of the left upper lobe nodule and rest of the nodules have not changed. Nodules increasing in size, prior FNAs negative  She has underlying bladder cancer, will focus on symptomatic treatment for multinodular goiter  She has no compressive symptoms      HTN -  Continue the same therapy        A fib - managed by Dr Jose David Willoughby    Thank you for allowing me to participate in the care of this patient.     Luke Ge MD    Patient/caregiver verbalized understanding

## 2021-04-15 LAB — T3 SERPL-MCNC: 104 NG/DL (ref 71–180)

## 2021-06-18 ENCOUNTER — APPOINTMENT (OUTPATIENT)
Dept: CT IMAGING | Age: 81
End: 2021-06-18
Attending: EMERGENCY MEDICINE
Payer: MEDICARE

## 2021-06-18 ENCOUNTER — HOSPITAL ENCOUNTER (EMERGENCY)
Age: 81
Discharge: HOME | End: 2021-06-18
Attending: EMERGENCY MEDICINE
Payer: MEDICARE

## 2021-06-18 VITALS
SYSTOLIC BLOOD PRESSURE: 108 MMHG | TEMPERATURE: 97.9 F | OXYGEN SATURATION: 97 % | DIASTOLIC BLOOD PRESSURE: 71 MMHG | HEART RATE: 61 BPM | RESPIRATION RATE: 14 BRPM
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
## 2021-06-18 DIAGNOSIS — S09.90XA INJURY OF HEAD, INITIAL ENCOUNTER: ICD-10-CM

## 2021-06-18 DIAGNOSIS — R53.1 GENERALIZED WEAKNESS: ICD-10-CM

## 2021-06-18 LAB
ALBUMIN SERPL-MCNC: 4 G/DL (ref 3.5–5)
ALBUMIN/GLOB SERPL: 1 {RATIO} (ref 1.1–2.2)
ALP SERPL-CCNC: 109 U/L (ref 45–117)
ALT SERPL-CCNC: 62 U/L (ref 12–78)
ANION GAP SERPL CALC-SCNC: 10 MMOL/L (ref 5–15)
APPEARANCE UR: CLEAR
AST SERPL-CCNC: 67 U/L (ref 15–37)
BACTERIA URNS QL MICRO: NEGATIVE /HPF
BASOPHILS # BLD: 0 K/UL (ref 0–0.1)
BASOPHILS NFR BLD: 1 % (ref 0–1)
BILIRUB SERPL-MCNC: 1.1 MG/DL (ref 0.2–1)
BILIRUB UR QL: NEGATIVE
BUN SERPL-MCNC: 21 MG/DL (ref 6–20)
BUN/CREAT SERPL: 22 (ref 12–20)
CALCIUM SERPL-MCNC: 9.3 MG/DL (ref 8.5–10.1)
CHLORIDE SERPL-SCNC: 106 MMOL/L (ref 97–108)
CO2 SERPL-SCNC: 27 MMOL/L (ref 21–32)
COLOR UR: NORMAL
COMMENT, HOLDF: NORMAL
CREAT SERPL-MCNC: 0.97 MG/DL (ref 0.55–1.02)
DIFFERENTIAL METHOD BLD: ABNORMAL
EOSINOPHIL # BLD: 0 K/UL (ref 0–0.4)
EOSINOPHIL NFR BLD: 1 % (ref 0–7)
EPITH CASTS URNS QL MICRO: NORMAL /LPF
ERYTHROCYTE [DISTWIDTH] IN BLOOD BY AUTOMATED COUNT: 15.4 % (ref 11.5–14.5)
GLOBULIN SER CALC-MCNC: 3.9 G/DL (ref 2–4)
GLUCOSE SERPL-MCNC: 112 MG/DL (ref 65–100)
GLUCOSE UR STRIP.AUTO-MCNC: NEGATIVE MG/DL
HCT VFR BLD AUTO: 39.3 % (ref 35–47)
HGB BLD-MCNC: 12.2 G/DL (ref 11.5–16)
HGB UR QL STRIP: NEGATIVE
IMM GRANULOCYTES # BLD AUTO: 0 K/UL (ref 0–0.04)
IMM GRANULOCYTES NFR BLD AUTO: 1 % (ref 0–0.5)
KETONES UR QL STRIP.AUTO: NEGATIVE MG/DL
LEUKOCYTE ESTERASE UR QL STRIP.AUTO: NEGATIVE
LYMPHOCYTES # BLD: 0.7 K/UL (ref 0.8–3.5)
LYMPHOCYTES NFR BLD: 19 % (ref 12–49)
MAGNESIUM SERPL-MCNC: 1.6 MG/DL (ref 1.6–2.4)
MCH RBC QN AUTO: 27.4 PG (ref 26–34)
MCHC RBC AUTO-ENTMCNC: 31 G/DL (ref 30–36.5)
MCV RBC AUTO: 88.1 FL (ref 80–99)
MONOCYTES # BLD: 0.3 K/UL (ref 0–1)
MONOCYTES NFR BLD: 7 % (ref 5–13)
NEUTS SEG # BLD: 2.6 K/UL (ref 1.8–8)
NEUTS SEG NFR BLD: 71 % (ref 32–75)
NITRITE UR QL STRIP.AUTO: NEGATIVE
NRBC # BLD: 0 K/UL (ref 0–0.01)
NRBC BLD-RTO: 0 PER 100 WBC
PH UR STRIP: 5.5 [PH] (ref 5–8)
PLATELET # BLD AUTO: 133 K/UL (ref 150–400)
PMV BLD AUTO: 12.1 FL (ref 8.9–12.9)
POTASSIUM SERPL-SCNC: 3.2 MMOL/L (ref 3.5–5.1)
PROT SERPL-MCNC: 7.9 G/DL (ref 6.4–8.2)
PROT UR STRIP-MCNC: NEGATIVE MG/DL
RBC # BLD AUTO: 4.46 M/UL (ref 3.8–5.2)
RBC #/AREA URNS HPF: NORMAL /HPF (ref 0–5)
RBC MORPH BLD: ABNORMAL
SAMPLES BEING HELD,HOLD: NORMAL
SODIUM SERPL-SCNC: 143 MMOL/L (ref 136–145)
SP GR UR REFRACTOMETRY: 1.01 (ref 1–1.03)
UR CULT HOLD, URHOLD: NORMAL
UROBILINOGEN UR QL STRIP.AUTO: 0.2 EU/DL (ref 0.2–1)
WBC # BLD AUTO: 3.6 K/UL (ref 3.6–11)
WBC URNS QL MICRO: NORMAL /HPF (ref 0–4)

## 2021-06-18 PROCEDURE — 36415 COLL VENOUS BLD VENIPUNCTURE: CPT

## 2021-06-18 PROCEDURE — 96360 HYDRATION IV INFUSION INIT: CPT

## 2021-06-18 PROCEDURE — 81001 URINALYSIS AUTO W/SCOPE: CPT

## 2021-06-18 PROCEDURE — 74011250636 HC RX REV CODE- 250/636: Performed by: EMERGENCY MEDICINE

## 2021-06-18 PROCEDURE — 85025 COMPLETE CBC W/AUTO DIFF WBC: CPT

## 2021-06-18 PROCEDURE — 80053 COMPREHEN METABOLIC PANEL: CPT

## 2021-06-18 PROCEDURE — 74011250637 HC RX REV CODE- 250/637: Performed by: EMERGENCY MEDICINE

## 2021-06-18 PROCEDURE — 99285 EMERGENCY DEPT VISIT HI MDM: CPT

## 2021-06-18 PROCEDURE — 83735 ASSAY OF MAGNESIUM: CPT

## 2021-06-18 PROCEDURE — 70450 CT HEAD/BRAIN W/O DYE: CPT

## 2021-06-18 RX ORDER — ACETAMINOPHEN 500 MG
1000 TABLET ORAL
Status: COMPLETED | OUTPATIENT
Start: 2021-06-18 | End: 2021-06-18

## 2021-06-18 RX ADMIN — ACETAMINOPHEN 1000 MG: 500 TABLET ORAL at 13:28

## 2021-06-18 RX ADMIN — SODIUM CHLORIDE 1000 ML: 9 INJECTION, SOLUTION INTRAVENOUS at 09:55

## 2021-06-18 NOTE — ED NOTES
The patient was discharged back to [facility] by Dr Patty Linares in stable condition. The patient is alert and oriented, in no respiratory distress and discharge vital signs obtained. The patient's diagnosis, condition and treatment were explained. The patient expressed understanding. Pt discharged from the ED via Ambulance by BETHANY PATE.

## 2021-06-18 NOTE — ED NOTES
[facility] present in department. Patient transferred to transport stretcher without difficulty. No acute distress at time of departure. EMS crew provided with discharge paperwork for facility.

## 2021-06-18 NOTE — ED NOTES
[address] and gave report back to [facility] staff member there. Discharge instructions to be given to [facility] with transport back to facility.

## 2021-06-18 NOTE — ED PROVIDER NOTES
49-year-old male presents from [facility] via EMS with complaints of weakness and fall. Patient states she felt fine after waking up this morning but she subsequently had 2 falls back to back. She did hit her head but denies any loss of consciousness. She feels overall weak but denies any focal weakness. Patient reports that she completed a course of antibiotics yesterday for urinary tract infection. She denies any chest pain or shortness of breath. No cough or fever. No vomiting or diarrhea. She does not take any blood thinning medications.            Past Medical History:   Diagnosis Date    [facility]     Atrial fibrillation (HCC)     Bladder cancer (HCC)     Constipation     CVA (cerebral vascular accident) [facility]     Dementia ([facility]     Diabetes ([facility]     Diverticulitis     DM (diabetes mellitus) ([facility]     Essential hypertension     Hemorrhoids     HTN (hypertension)     Hyperlipidemia        Past Surgical History:   Procedure Laterality Date    HX GI      HX HYSTERECTOMY      HX PACEMAKER PLACEMENT Left     HX TUBAL LIGATION      POLYPECTOMY- HOT BX           Family History:   Problem Relation Age of Onset    Hypertension Mother     Stroke Mother     Liver Disease Father     Hypertension Father        Social History     Socioeconomic History    Marital status: SINGLE     Spouse name: Not on file    Number of children: Not on file    Years of education: Not on file    Highest education level: Not on file   Occupational History    Not on file   Tobacco Use    Smoking status: Never Smoker    Smokeless tobacco: Never Used   Substance and Sexual Activity    Alcohol use: No    Drug use: No    Sexual activity: Never   Other Topics Concern    Not on file   Social History Narrative    ** Merged History Encounter **          Social Determinants of Health     Financial Resource Strain:     Difficulty of Paying Living Expenses:    Food Insecurity:     Worried About [address] in the Last Year:    [address] in the Last Year:    Transportation Needs:     Lack of Transportation (Medical):  Lack of Transportation (Non-Medical):    Physical Activity:     Days of Exercise per Week:     Minutes of Exercise per Session:    Stress:     Feeling of Stress :    Social Connections:     Frequency of Communication with Friends and Family:     Frequency of Social Gatherings with Friends and Family:     Attends Zoroastrian Services:     Active Member of Clubs or Organizations:     Attends Club or Organization Meetings:     Marital Status:    Intimate Partner Violence:     Fear of Current or Ex-Partner:     Emotionally Abused:     Physically Abused:     Sexually Abused: ALLERGIES: Erythromycin, Flagyl [metronidazole], Macrobid [nitrofurantoin monohyd/m-cryst], Pcn [penicillins], and Sulfa (sulfonamide antibiotics)    Review of Systems   Constitutional: Negative for fever. HENT: Negative for facial swelling. Eyes: Negative for visual disturbance. Respiratory: Negative for chest tightness. Cardiovascular: Negative for chest pain. Gastrointestinal: Negative for abdominal pain. Genitourinary: Negative for difficulty urinating and dysuria. Musculoskeletal: Negative for arthralgias. Skin: Negative for rash. Neurological: Positive for weakness. Negative for headaches. Hematological: Negative for adenopathy. Psychiatric/Behavioral: Negative for suicidal ideas. There were no vitals filed for this visit. Physical Exam  Vitals and nursing note reviewed. Constitutional:       General: She is not in acute distress. Appearance: She is well-developed. HENT:      Head: Normocephalic and atraumatic. Eyes:      General: No scleral icterus. Conjunctiva/sclera: Conjunctivae normal.      Pupils: Pupils are equal, round, and reactive to light. Cardiovascular:      Rate and Rhythm: Normal rate.       Heart sounds: No murmur heard. Pulmonary:      Effort: Pulmonary effort is normal. No respiratory distress. Abdominal:      General: There is no distension. Musculoskeletal:         General: Normal range of motion. Cervical back: Normal range of motion and neck supple. Skin:     General: Skin is warm and dry. Findings: No rash. Neurological:      Mental Status: She is alert and oriented to person, place, and time. MDM  Number of Diagnoses or Management Options  Fall, initial encounter  Generalized weakness  Injury of head, initial encounter  Diagnosis management comments: Patient resting comfortably at this time. Head CT was unremarkable for any injuries. Blood work reassuring and the urine analysis did not show any signs of infection. Vital signs are stable. I think it safe for the patient to be discharged home. She is encouraged to drink plenty of fluids. She can return to the ED if she has any new or worsening symptoms.        Amount and/or Complexity of Data Reviewed  Clinical lab tests: reviewed  Tests in the radiology section of CPT®: reviewed           Procedures

## 2021-06-22 ENCOUNTER — TRANSCRIBE ORDER (OUTPATIENT)
Dept: SCHEDULING | Age: 81
End: 2021-06-22

## 2021-06-22 DIAGNOSIS — Z12.31 SCREENING MAMMOGRAM FOR HIGH-RISK PATIENT: Primary | ICD-10-CM

## 2021-07-20 ENCOUNTER — HOSPITAL ENCOUNTER (OUTPATIENT)
Dept: MAMMOGRAPHY | Age: 81
Discharge: HOME | End: 2021-07-20
Payer: MEDICARE

## 2021-07-20 DIAGNOSIS — Z12.31 SCREENING MAMMOGRAM FOR HIGH-RISK PATIENT: ICD-10-CM

## 2021-07-20 PROCEDURE — 77067 SCR MAMMO BI INCL CAD: CPT

## 2021-09-14 NOTE — PROGRESS NOTES
Hospitalist Progress Note Burton Marquez MD 
Answering service: [phone] OR [phone] from in house phone Date of Service:  10/18/2018 NAME:  Mariah Carr :  1940 MRN:  [id number] Admission Summary:  
74F PMH bladder ca, Afib-s/p ppm, CVA, DM, Dementia was [facility] after a fall, left hip severe pain, evaluated by ortho who planned for MRI Interval history / Subjective:  
Patient seen and examined at bedside, feels ok, still c/o pain in left thigh area, not severe when lying, worse with movement. Assessment & Plan:  
 
Fall/Ambulatory dysfunction 
- uses walker 
- admitted from [facility] 
- CT LLE: No fracture. Mild left hip osteoarthritis. Labral chondrocalcinosis - Ortho consulted: Suspect her recent fall has exacerbated symptoms of underlying degenerative changes. Can't have MRI in [facility] due to having PPM 
- PT/OT following, will need SNF 
  
Hx stage II bladder cancer s/p TURBT, chemo and XRT 
- follows with [facility] oncology - Hx urinary incontinence: c/w oxybutnin 
  
Afib - darius s/p pacemaker- rate controlled on amiodarone Hx bilateral DVT 
CVA - hemorrhagic DM - ISS, A1c ordered HLD - statin HTN- c/w losartan. amlodipine on hold Hyperthyroidism- TSH 6.5. C/w methimazole Code status: Full DVT prophylaxis: SCDs Care Plan discussed with: Patient/Family and Nurse Disposition: SNF/LTC and TBD Hospital Problems  Date Reviewed: 10/16/2018 Codes Class Noted POA Fall ICD-10-CM: W19. Debroajason Noemi ICD-9-CM: V577.8  10/17/2018 Unknown * (Principal) Left hip pain ICD-10-CM: M25.552 ICD-9-CM: 719.45  10/17/2018 Unknown Inability to walk ICD-10-CM: R26.2 ICD-9-CM: 719.7  10/17/2018 Unknown Review of Systems:  
Pertinent items are mentioned in interval history. Vital Signs:  
 Last 24hrs VS reviewed since prior progress note. Most recent are: 
Visit Vitals /64 (BP 1 Location: Right arm, BP Patient Position: At rest) Pulse 60 Temp 98.7 °F (37.1 °C) Resp 18 Ht 5' 5\" (1.651 m) Wt 71.3 kg (157 lb 3 oz) SpO2 97% BMI 26.16 kg/m² Intake/Output Summary (Last 24 hours) at 10/18/2018 1504 Last data filed at 10/17/2018 2016 Gross per 24 hour Intake 240 ml Output  Net 240 ml Physical Examination:  
 
General:  Alert, oriented, No acute distress HEENT:  Pink conjunctivae, hearing intact to voice, moist mucous membranes Neck:  Supple, without masses, thyroid non-tender Card:  S1, S2 without murmurs, good peripheral perfusion, No peripheral edema Resp:  No accessory muscle use, clear breath sounds without wheezes or rhonchi Abd:  Soft, non-tender, non-distended, BS+, no mass Extremities:  No cyanosis or clubbing, no significant edema Skin:  No rashes or ulcers, skin turgor is good Neuro:  Grossly normal, no focal neuro deficits, follows commands Psych:  Good insight, AAOx3, not agitated. Data Review:  
 Review and/or order of clinical lab test 
Review and/or order of tests in the radiology section of [facility] Labs:  
 
Recent Labs 10/18/18 
0121 10/17/18 
2285 WBC 5.5 5.6 HGB 11.0* 10.8* HCT 34.7* 34.8*  
* 150 Recent Labs 10/18/18 
0121 10/17/18 
3963  142  
K 3.7 3.5  105 CO2 27 30 BUN 14 14 CREA 0.75 1.00 GLU 88 92 CA 8.5 8.9 Recent Labs 10/17/18 
9766 SGOT 43* ALT 32  
AP 82 TBILI 0.8 TP 6.9 ALB 2.9*  
GLOB 4.0 No results for input(s): INR, PTP, APTT in the last 72 hours. No lab exists for component: INREXT No results for input(s): FE, TIBC, PSAT, FERR in the last 72 hours. No results found for: FOL, RBCF No results for input(s): PH, PCO2, PO2 in the last 72 hours. No results for input(s): CPK, CKNDX, TROIQ in the last 72 hours. No lab exists for component: CPKMB Lab Results Component Value Date/Time Cholesterol, total 167 01/18/2016 12:00 AM  
 HDL Cholesterol 80 01/18/2016 12:00 AM  
 LDL,Direct 86 11/16/2016 04:35 AM  
 LDL, calculated 76 01/18/2016 12:00 AM  
 Triglyceride 54 01/18/2016 12:00 AM  
 
Lab Results Component Value Date/Time Glucose (POC) 101 (H) 10/18/2018 11:16 AM  
 Glucose (POC) 87 10/18/2018 06:06 AM  
 Glucose (POC) 92 10/17/2018 09:27 PM  
 Glucose (POC) 89 10/17/2018 04:33 PM  
 Glucose (POC) 94 10/17/2018 11:25 AM  
 
Lab Results Component Value Date/Time Color YELLOW/STRAW 10/17/2018 05:30 AM  
 Appearance CLEAR 10/17/2018 05:30 AM  
 Specific gravity 1.015 10/17/2018 05:30 AM  
 pH (UA) 6.0 10/17/2018 05:30 AM  
 Protein NEGATIVE  10/17/2018 05:30 AM  
 Glucose NEGATIVE  10/17/2018 05:30 AM  
 Ketone NEGATIVE  10/17/2018 05:30 AM  
 Bilirubin NEGATIVE  10/17/2018 05:30 AM  
 Urobilinogen 0.2 10/17/2018 05:30 AM  
 Nitrites POSITIVE (A) 10/17/2018 05:30 AM  
 Leukocyte Esterase TRACE (A) 10/17/2018 05:30 AM  
 Epithelial cells FEW 10/17/2018 05:30 AM  
 Bacteria NEGATIVE  10/17/2018 05:30 AM  
 WBC 0-4 10/17/2018 05:30 AM  
 RBC 0-5 10/17/2018 05:30 AM  
 
 
Medications Reviewed:  
 
Current Facility-Administered Medications Medication Dose Route Frequency  methIMAzole (TAPAZOLE) tablet 7.5 mg  7.5 mg Oral DAILY  losartan (COZAAR) tablet 50 mg  50 mg Oral BID  sodium chloride (NS) flush 5-10 mL  5-10 mL IntraVENous Q8H  
 sodium chloride (NS) flush 5-10 mL  5-10 mL IntraVENous PRN  
 acetaminophen (TYLENOL) tablet 650 mg  650 mg Oral Q6H PRN  
 insulin regular (NOVOLIN R, HUMULIN R) injection   SubCUTAneous AC&HS  
 glucose chewable tablet 16 g  4 Tab Oral PRN  
 dextrose (D50W) injection syrg 12.5-25 g  12.5-25 g IntraVENous PRN  
 glucagon (GLUCAGEN) injection 1 mg  1 mg IntraMUSCular PRN  
 amiodarone (CORDARONE) tablet 200 mg  200 mg Oral DAILY  oxybutynin (DITROPAN) tablet 10 mg  10 mg Oral DAILY  pravastatin (PRAVACHOL) tablet 80 mg  80 mg Oral QHS  oxyCODONE-acetaminophen (PERCOCET) 5-325 mg per tablet 1 Tab  1 Tab Oral Q4H PRN  
 
______________________________________________________________________ EXPECTED LENGTH OF STAY: - - - 
ACTUAL LENGTH OF STAY:          0 Va Allen MD  
 Odomzo Counseling- I discussed with the patient the risks of Odomzo including but not limited to nausea, vomiting, diarrhea, constipation, weight loss, changes in the sense of taste, decreased appetite, muscle spasms, and hair loss.  The patient verbalized understanding of the proper use and possible adverse effects of Odomzo.  All of the patient's questions and concerns were addressed.

## 2022-01-25 ENCOUNTER — DOCUMENTATION ONLY (OUTPATIENT)
Dept: ENDOCRINOLOGY | Age: 82
End: 2022-01-25

## 2022-01-25 NOTE — PROGRESS NOTES
Ultrasound thyroid January 2022    Right lobe measures 5.3 x 3.1 x 2.2 cm. Left lobe 5.5 x 3.5 x 2.7 cm  Right thyroid has a nodule measuring 1.3 x 1.5 x 1.0 cm, left lobe nodule measuring 2.2 x 1.9 x 1.3 cm.   No significant change compared to 2020

## 2022-03-06 ENCOUNTER — APPOINTMENT (OUTPATIENT)
Dept: CT IMAGING | Age: 82
End: 2022-03-06
Attending: EMERGENCY MEDICINE
Payer: MEDICARE

## 2022-03-06 ENCOUNTER — HOSPITAL ENCOUNTER (EMERGENCY)
Age: 82
Discharge: HOME | End: 2022-03-06
Attending: EMERGENCY MEDICINE
Payer: MEDICARE

## 2022-03-06 ENCOUNTER — APPOINTMENT (OUTPATIENT)
Dept: GENERAL RADIOLOGY | Age: 82
End: 2022-03-06
Attending: EMERGENCY MEDICINE
Payer: MEDICARE

## 2022-03-06 VITALS
HEART RATE: 63 BPM | OXYGEN SATURATION: 96 % | RESPIRATION RATE: 16 BRPM | SYSTOLIC BLOOD PRESSURE: 118 MMHG | TEMPERATURE: 97.9 F | DIASTOLIC BLOOD PRESSURE: 61 MMHG

## 2022-03-06 DIAGNOSIS — R10.817 GENERALIZED ABDOMINAL TENDERNESS WITHOUT REBOUND TENDERNESS: Primary | ICD-10-CM

## 2022-03-06 LAB
ALBUMIN SERPL-MCNC: 3.8 G/DL (ref 3.5–5)
ALBUMIN/GLOB SERPL: 1 {RATIO} (ref 1.1–2.2)
ALP SERPL-CCNC: 74 U/L (ref 45–117)
ALT SERPL-CCNC: 34 U/L (ref 12–78)
ANION GAP SERPL CALC-SCNC: 10 MMOL/L (ref 5–15)
APPEARANCE UR: CLEAR
AST SERPL-CCNC: 44 U/L (ref 15–37)
BACTERIA URNS QL MICRO: ABNORMAL /HPF
BASOPHILS # BLD: 0 K/UL (ref 0–0.1)
BASOPHILS NFR BLD: 0 % (ref 0–1)
BILIRUB SERPL-MCNC: 1.3 MG/DL (ref 0.2–1)
BILIRUB UR QL: NEGATIVE
BNP SERPL-MCNC: 245 PG/ML (ref 0–450)
BUN SERPL-MCNC: 26 MG/DL (ref 6–20)
BUN/CREAT SERPL: 29 (ref 12–20)
CALCIUM SERPL-MCNC: 9.1 MG/DL (ref 8.5–10.1)
CHLORIDE SERPL-SCNC: 107 MMOL/L (ref 97–108)
CO2 SERPL-SCNC: 26 MMOL/L (ref 21–32)
COLOR UR: ABNORMAL
CREAT SERPL-MCNC: 0.9 MG/DL (ref 0.55–1.02)
DIFFERENTIAL METHOD BLD: ABNORMAL
EOSINOPHIL # BLD: 0 K/UL (ref 0–0.4)
EOSINOPHIL NFR BLD: 0 % (ref 0–7)
EPITH CASTS URNS QL MICRO: ABNORMAL /LPF
ERYTHROCYTE [DISTWIDTH] IN BLOOD BY AUTOMATED COUNT: 16.2 % (ref 11.5–14.5)
GLOBULIN SER CALC-MCNC: 3.9 G/DL (ref 2–4)
GLUCOSE SERPL-MCNC: 102 MG/DL (ref 65–100)
GLUCOSE UR STRIP.AUTO-MCNC: NEGATIVE MG/DL
HCT VFR BLD AUTO: 43.8 % (ref 35–47)
HGB BLD-MCNC: 14.4 G/DL (ref 11.5–16)
HGB UR QL STRIP: NEGATIVE
IMM GRANULOCYTES # BLD AUTO: 0.1 K/UL (ref 0–0.04)
IMM GRANULOCYTES NFR BLD AUTO: 1 % (ref 0–0.5)
KETONES UR QL STRIP.AUTO: 15 MG/DL
LACTATE SERPL-SCNC: 1.3 MMOL/L (ref 0.4–2)
LEUKOCYTE ESTERASE UR QL STRIP.AUTO: NEGATIVE
LIPASE SERPL-CCNC: 147 U/L (ref 73–393)
LYMPHOCYTES # BLD: 1.2 K/UL (ref 0.8–3.5)
LYMPHOCYTES NFR BLD: 18 % (ref 12–49)
MCH RBC QN AUTO: 29.7 PG (ref 26–34)
MCHC RBC AUTO-ENTMCNC: 32.9 G/DL (ref 30–36.5)
MCV RBC AUTO: 90.3 FL (ref 80–99)
MONOCYTES # BLD: 0.7 K/UL (ref 0–1)
MONOCYTES NFR BLD: 11 % (ref 5–13)
NEUTS SEG # BLD: 4.4 K/UL (ref 1.8–8)
NEUTS SEG NFR BLD: 70 % (ref 32–75)
NITRITE UR QL STRIP.AUTO: POSITIVE
NRBC # BLD: 0 K/UL (ref 0–0.01)
NRBC BLD-RTO: 0 PER 100 WBC
PH UR STRIP: 6 [PH] (ref 5–8)
PLATELET # BLD AUTO: 178 K/UL (ref 150–400)
PMV BLD AUTO: 12.9 FL (ref 8.9–12.9)
POTASSIUM SERPL-SCNC: 4.1 MMOL/L (ref 3.5–5.1)
PROT SERPL-MCNC: 7.7 G/DL (ref 6.4–8.2)
PROT UR STRIP-MCNC: NEGATIVE MG/DL
RBC # BLD AUTO: 4.85 M/UL (ref 3.8–5.2)
RBC #/AREA URNS HPF: ABNORMAL /HPF (ref 0–5)
SODIUM SERPL-SCNC: 143 MMOL/L (ref 136–145)
SP GR UR REFRACTOMETRY: 1.02 (ref 1–1.03)
T4 FREE SERPL-MCNC: 1.3 NG/DL (ref 0.8–1.5)
TROPONIN-HIGH SENSITIVITY: 43 NG/L (ref 0–51)
TSH SERPL DL<=0.05 MIU/L-ACNC: 3.52 UIU/ML (ref 0.36–3.74)
UR CULT HOLD, URHOLD: NORMAL
UROBILINOGEN UR QL STRIP.AUTO: 0.2 EU/DL (ref 0.2–1)
WBC # BLD AUTO: 6.4 K/UL (ref 3.6–11)
WBC URNS QL MICRO: ABNORMAL /HPF (ref 0–4)

## 2022-03-06 PROCEDURE — 74011250636 HC RX REV CODE- 250/636: Performed by: EMERGENCY MEDICINE

## 2022-03-06 PROCEDURE — 74011000636 HC RX REV CODE- 636: Performed by: EMERGENCY MEDICINE

## 2022-03-06 PROCEDURE — 87086 URINE CULTURE/COLONY COUNT: CPT

## 2022-03-06 PROCEDURE — 87186 SC STD MICRODIL/AGAR DIL: CPT

## 2022-03-06 PROCEDURE — 85025 COMPLETE CBC W/AUTO DIFF WBC: CPT

## 2022-03-06 PROCEDURE — 83880 ASSAY OF NATRIURETIC PEPTIDE: CPT

## 2022-03-06 PROCEDURE — 87077 CULTURE AEROBIC IDENTIFY: CPT

## 2022-03-06 PROCEDURE — 96375 TX/PRO/DX INJ NEW DRUG ADDON: CPT

## 2022-03-06 PROCEDURE — 84443 ASSAY THYROID STIM HORMONE: CPT

## 2022-03-06 PROCEDURE — 84484 ASSAY OF TROPONIN QUANT: CPT

## 2022-03-06 PROCEDURE — 96374 THER/PROPH/DIAG INJ IV PUSH: CPT

## 2022-03-06 PROCEDURE — 99285 EMERGENCY DEPT VISIT HI MDM: CPT

## 2022-03-06 PROCEDURE — 36415 COLL VENOUS BLD VENIPUNCTURE: CPT

## 2022-03-06 PROCEDURE — 83605 ASSAY OF LACTIC ACID: CPT

## 2022-03-06 PROCEDURE — 83690 ASSAY OF LIPASE: CPT

## 2022-03-06 PROCEDURE — 81001 URINALYSIS AUTO W/SCOPE: CPT

## 2022-03-06 PROCEDURE — 93005 ELECTROCARDIOGRAM TRACING: CPT

## 2022-03-06 PROCEDURE — 71045 X-RAY EXAM CHEST 1 VIEW: CPT

## 2022-03-06 PROCEDURE — 80053 COMPREHEN METABOLIC PANEL: CPT

## 2022-03-06 PROCEDURE — 74177 CT ABD & PELVIS W/CONTRAST: CPT

## 2022-03-06 PROCEDURE — 84439 ASSAY OF FREE THYROXINE: CPT

## 2022-03-06 RX ORDER — ONDANSETRON 4 MG/1
4 TABLET, ORALLY DISINTEGRATING ORAL
Qty: 15 TABLET | Refills: 0 | Status: SHIPPED | OUTPATIENT
Start: 2022-03-06 | End: 2022-03-06 | Stop reason: SDUPTHER

## 2022-03-06 RX ORDER — ONDANSETRON 2 MG/ML
4 INJECTION INTRAMUSCULAR; INTRAVENOUS
Status: COMPLETED | OUTPATIENT
Start: 2022-03-06 | End: 2022-03-06

## 2022-03-06 RX ORDER — FENTANYL CITRATE 50 UG/ML
50 INJECTION, SOLUTION INTRAMUSCULAR; INTRAVENOUS
Status: COMPLETED | OUTPATIENT
Start: 2022-03-06 | End: 2022-03-06

## 2022-03-06 RX ORDER — ONDANSETRON 4 MG/1
4 TABLET, ORALLY DISINTEGRATING ORAL
Qty: 15 TABLET | Refills: 0 | Status: SHIPPED | OUTPATIENT
Start: 2022-03-06 | End: 2022-03-12

## 2022-03-06 RX ORDER — POLYETHYLENE GLYCOL 3350 17 G/17G
17 POWDER, FOR SOLUTION ORAL DAILY
COMMUNITY

## 2022-03-06 RX ORDER — FUROSEMIDE 20 MG/1
20 TABLET ORAL DAILY
COMMUNITY
End: 2022-08-30

## 2022-03-06 RX ADMIN — SODIUM CHLORIDE 1000 ML: 9 INJECTION, SOLUTION INTRAVENOUS at 15:05

## 2022-03-06 RX ADMIN — FENTANYL CITRATE 50 MCG: 0.05 INJECTION, SOLUTION INTRAMUSCULAR; INTRAVENOUS at 15:06

## 2022-03-06 RX ADMIN — IOPAMIDOL 100 ML: 755 INJECTION, SOLUTION INTRAVENOUS at 16:37

## 2022-03-06 RX ADMIN — ONDANSETRON HYDROCHLORIDE 4 MG: 2 SOLUTION INTRAMUSCULAR; INTRAVENOUS at 15:06

## 2022-03-06 NOTE — ED NOTES
Pain assessment on discharge was   Condition Stable  Patient discharged to SNF  Patient education was completed  Education taught to n/a - Discharge instruction with [facility] crew to give to nursing staff   Teaching method used was handout and verbal  Understanding of teaching was n/a  Patient was discharged via stretcher with [facility]  Discharged with [address] [place] [address] were given to:

## 2022-03-06 NOTE — ED NOTES
[facility] patient. They were given discharge instructions with one Rx for Zofran to give to staff. They were also given ED summary for nursing staff.

## 2022-03-06 NOTE — ED PROVIDER NOTES
Please note that this dictation was completed with Variable, the computer voice recognition software.  Quite often unanticipated grammatical, syntax, homophones, and other interpretive errors are inadvertently transcribed by the computer software.  Please disregard these errors.  Please excuse any errors that have escaped final proofreading. 44-year-old female past medical history markable for A. fib, bladder cancer, constipation, CVA, dementia, diabetes, diverticulitis, essential hypertension, hemorrhoids, and hyperlipidemia presents the ER via EMS complaining of \" abdominal pain x2 months and 2 days associated with intermittent constipation and diarrhea. Patient states she is also lost weight over the past several weeks which has been unintentional.  Patient states this happens because \"I feel nauseated like and get a vomit and I do not feel like eating. Patient states she tried to eat some soup earlier today was unable to eat soup. This was secondary to the nausea. Patient states she also tried to eat some soup yesterday and was unable to eat soup. Patient states \"been a while\" since have had solid food. Patient denies any change in her abdominal pain today with her bowel movement earlier this morning states abdominal pain is generalized outgoing \"into both flanks. \"  Patient describes an achy pain constant nature gradually worsening. Also having midepigastric location which is associated with nausea. She denies overt chest pain says she also has a cough which is nonproductive. Patient currently knows today is \"Sunday or Monday\", March, unsure of the year.     pt denies HA, vison changes, diff swallowing, CP, SOB,  F/Ch, Vomiting,  or other current systemic complaints    Social/ PSH reviewed in EMR    EMR Chart Reviewed           Past Medical History:   Diagnosis Date    Atrial fibrillation ([facility]     Atrial fibrillation (HCC)     Bladder cancer (HCC)     Constipation     CVA [facility]ce[facility]bral vascular accident) ([facility]     Dementia ([facility]     Diabetes ([facility]     Diverticulitis     DM (diabetes mellitus) ([facility]     Essential hypertension     Hemorrhoids     HTN (hypertension)     Hyperlipidemia        Past Surgical History:   Procedure Laterality Date    HX GI      HX HYSTERECTOMY      HX PACEMAKER PLACEMENT Left     HX TUBAL LIGATION      POLYPECTOMY- HOT BX           Family History:   Problem Relation Age of Onset    Hypertension Mother     Stroke Mother     Liver Disease Father     Hypertension Father     Breast Cancer Maternal Aunt        Social History     Socioeconomic History    Marital status: SINGLE     Spouse name: Not on file    Number of children: Not on file    Years of education: Not on file    Highest education level: Not on file   Occupational History    Not on file   Tobacco Use    Smoking status: Never Smoker    Smokeless tobacco: Never Used   Substance and Sexual Activity    Alcohol use: No    Drug use: No    Sexual activity: Never   Other Topics Concern    Not on file   Social History Narrative    ** Merged History Encounter **          Social Determinants of Health     Financial Resource Strain:     Difficulty of Paying Living Expenses: Not on file   Food Insecurity:     Worried About Running Out of Food in the Last Year: Not on file    Bernadine of Food in the Last Year: Not on file   Transportation Needs:     Lack of Transportation (Medical): Not on file    Lack of Transportation (Non-Medical):  Not on file   Physical Activity:     Days of Exercise per Week: Not on file    Minutes of Exercise per Session: Not on file   Stress:     Feeling of Stress : Not on file   Social Connections:     Frequency of Communication with Friends and Family: Not on file    Frequency of Social Gatherings with Friends and Family: Not on file    Attends [employer] Services: Not on file    Active Member of Clubs or Organizations: Not on file    Attends Club or Organization Meetings: Not on file    Marital Status: Not on file   Intimate Partner Violence:     Fear of Current or Ex-Partner: Not on file    Emotionally Abused: Not on file    Physically Abused: Not on file    Sexually Abused: Not on file   Housing Stability:     Unable to Pay for Housing in the Last Year: Not on file    Number of [place] in the Last Year: Not on file    Unstable Housing in the Last Year: Not on file         ALLERGIES: Erythromycin, Flagyl [metronidazole], Macrobid [nitrofurantoin monohyd/m-cryst], Pcn [penicillins], and Sulfa (sulfonamide antibiotics)    Review of Systems   Constitutional: Positive for appetite change. Negative for chills and fever. HENT: Negative for drooling, rhinorrhea, trouble swallowing and voice change. Eyes: Negative for visual disturbance. Respiratory: Positive for cough. Negative for choking, chest tightness, shortness of breath, wheezing and stridor. Cardiovascular: Negative for chest pain, palpitations and leg swelling. Gastrointestinal: Positive for abdominal pain, constipation, diarrhea and nausea. Negative for vomiting. Genitourinary: Negative for dysuria and vaginal discharge. Musculoskeletal: Negative for back pain. Skin: Negative for rash. Neurological: Negative for facial asymmetry and speech difficulty. Psychiatric/Behavioral: Negative for confusion. All other systems reviewed and are negative. There were no vitals filed for this visit. Physical Exam  Vitals and nursing note reviewed. Exam conducted with a chaperone present. Constitutional:       General: She is not in acute distress. Appearance: Normal appearance. She is well-developed. She is not ill-appearing, toxic-appearing or diaphoretic. Comments: Pleasent, NAD, AxOx4, speaking in complete sentences       HENT:      Head: Normocephalic and atraumatic. Right Ear: External ear normal.      Left Ear: External ear normal.   Eyes:      General: No scleral icterus. Right eye: No discharge. Extraocular Movements: Extraocular movements intact. Conjunctiva/sclera: Conjunctivae normal.      Pupils: Pupils are equal, round, and reactive to light. Neck:      Vascular: No JVD. Trachea: No tracheal deviation. Cardiovascular:      Rate and Rhythm: Normal rate and regular rhythm. Heart sounds: Normal heart sounds. No murmur heard. No friction rub. No gallop. Pulmonary:      Effort: Pulmonary effort is normal. No respiratory distress. Breath sounds: Normal breath sounds. No stridor. No wheezing, rhonchi or rales. Chest:      Chest wall: No tenderness. Abdominal:      General: Bowel sounds are normal.      Palpations: Abdomen is soft. Tenderness: There is abdominal tenderness. There is no guarding or rebound. Hernia: No hernia is present. Comments: Mid-epigastric abd ttp/ ? Peritoneal signs; Genitourinary:     Comments: Pt denies urinary/ vaginal complaints  Musculoskeletal:         General: No tenderness, deformity or signs of injury. Normal range of motion. Cervical back: Normal range of motion and neck supple. No tenderness. Right lower leg: No edema. Left lower leg: No edema. Skin:     General: Skin is warm and dry. Capillary Refill: Capillary refill takes less than 2 seconds. Coloration: Skin is not jaundiced or pale. Findings: No bruising, erythema, lesion or rash. Neurological:      General: No focal deficit present. Mental Status: She is alert and oriented to person, place, and time. Cranial Nerves: No cranial nerve deficit. Sensory: No sensory deficit. Motor: No weakness or abnormal muscle tone. Coordination: Coordination normal.      Gait: Gait normal.      Deep Tendon Reflexes: Reflexes normal.   Psychiatric:         Behavior: Behavior normal.         Thought Content:  Thought content normal.          MDM       Procedures    Chief Complaint   Patient presents with    Abdominal Pain       2:43 PM  The patients presenting problems have been discussed, and they are in agreement with the care plan formulated and outlined with them. I have encouraged them to ask questions as they arise throughout their visit. MEDICATIONS GIVEN:  Medications - No data to display    LABS REVIEWED:  Labs Reviewed - No data to display    RADIOLOGY RESULTS:  The following have been ordered and reviewed:  _____________________________________________________________________  _____________________________________________________________________    EKG interpretation:   Rhythm: paced rhythm; and regular . Rate (approx.): 60; Axis: normal; P wave: absent; QRS interval: normal ; ST/T wave: normal; Negative acute significant segmental elevations/ compared to study dated 11/15/2016 paced rhythm has replaced a flutter;     PROCEDURES:        CONSULTATIONS:       PROGRESS NOTES:      DIAGNOSIS:    1. Generalized abdominal tenderness without rebound tenderness        D/c home w/ zofran RX;       ED COURSE: The patients hospital course has been uncomplicated. 5:10 PM  Manjit Whitfield's  results have been reviewed with her. She has been counseled regarding her diagnosis. She verbally conveys understanding and agreement of the signs, symptoms, diagnosis, treatment and prognosis and additionally agrees to Call/ Arrange follow up as recommended with Dr. Trinity Castaneda MD in 24 - 48 hours. She also agrees with the care-plan and conveys that all of her questions have been answered. I have also put together some discharge instructions for her that include: 1) educational information regarding their diagnosis, 2) how to care for their diagnosis at home, as well a 3) list of reasons why they would want to return to the ED prior to their follow-up appointment, should their condition change or for concerns.

## 2022-03-06 NOTE — DISCHARGE INSTRUCTIONS
Thank you for allowing us to provide you with medical care today. We realize that you have many choices for your emergency care needs. We thank you for choosing [facility]. Please choose us in the future for any continued health care needs. We hope we addressed all of your medical concerns. We strive to provide excellent quality care in the Emergency Department. Anything less than excellent does not meet our expectations. The exam and treatment you received in the Emergency Department were for an emergent problem and are not intended as complete care. It is important that you follow up with a doctor, nurse practitioner, or physician's assistant for ongoing care. If your symptoms worsen or you do not improve as expected and you are unable to reach your usual health care provider, you should return to the Emergency Department. We are available 24 hours a day. Take this sheet with you when you go to your follow-up visit. If you have any problem arranging the follow-up visit, contact the Emergency Department immediately. Make an appointment your family doctor for follow up of this visit. Return to the ER if you are unable to be seen in a timely manner.

## 2022-03-06 NOTE — ED TRIAGE NOTES
Patient arrives via EMS with complaints of intermittent constipation and diarrhea with abdominal pain x two months. Last BM this morning.

## 2022-03-07 LAB
ATRIAL RATE: 61 BPM
CALCULATED P AXIS, ECG09: 63 DEGREES
CALCULATED R AXIS, ECG10: -18 DEGREES
CALCULATED T AXIS, ECG11: 122 DEGREES
DIAGNOSIS, 93000: NORMAL
Q-T INTERVAL, ECG07: 524 MS
QRS DURATION, ECG06: 180 MS
QTC CALCULATION (BEZET), ECG08: 527 MS
VENTRICULAR RATE, ECG03: 61 BPM

## 2022-03-09 LAB
BACTERIA SPEC CULT: ABNORMAL
BACTERIA SPEC CULT: ABNORMAL
CC UR VC: ABNORMAL
SERVICE CMNT-IMP: ABNORMAL

## 2022-03-11 NOTE — PROGRESS NOTES
I spoke with staff at [facility].  Result of urine culture faxed via [facility] to [phone] for pcp to review

## 2022-03-19 PROBLEM — M25.552 LEFT HIP PAIN: Status: ACTIVE | Noted: 2018-10-17

## 2022-03-19 PROBLEM — R26.2 INABILITY TO WALK: Status: ACTIVE | Noted: 2018-10-17
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
## 2022-03-20 PROBLEM — I82.4Y3 ACUTE DEEP VEIN THROMBOSIS (DVT) OF PROXIMAL VEIN OF BOTH LOWER EXTREMITIES (HCC): Status: ACTIVE | Noted: 2017-10-20

## 2022-04-29 ENCOUNTER — TRANSCRIBE ORDER (OUTPATIENT)
Dept: SCHEDULING | Age: 82
End: 2022-04-29

## 2022-04-29 DIAGNOSIS — R10.84 ABDOMINAL PAIN, GENERALIZED: ICD-10-CM

## 2022-04-29 DIAGNOSIS — R63.4 WEIGHT LOSS: ICD-10-CM

## 2022-04-29 DIAGNOSIS — R63.0 DECREASED APPETITE: Primary | ICD-10-CM

## 2022-05-02 ENCOUNTER — TRANSCRIBE ORDER (OUTPATIENT)
Dept: SCHEDULING | Age: 82
End: 2022-05-02

## 2022-05-02 DIAGNOSIS — R63.0 DECREASED APPETITE: Primary | ICD-10-CM

## 2022-05-02 DIAGNOSIS — R63.4 WEIGHT LOSS: ICD-10-CM

## 2022-05-02 DIAGNOSIS — R10.84 GENERALIZED ABDOMINAL PAIN: ICD-10-CM

## 2022-05-24 ENCOUNTER — HOSPITAL ENCOUNTER (EMERGENCY)
Age: 82
Discharge: HOME | End: 2022-05-25
Attending: EMERGENCY MEDICINE
Payer: MEDICARE

## 2022-05-24 ENCOUNTER — APPOINTMENT (OUTPATIENT)
Dept: CT IMAGING | Age: 82
End: 2022-05-24
Attending: EMERGENCY MEDICINE
Payer: MEDICARE

## 2022-05-24 ENCOUNTER — APPOINTMENT (OUTPATIENT)
Dept: GENERAL RADIOLOGY | Age: 82
End: 2022-05-24
Attending: EMERGENCY MEDICINE
Payer: MEDICARE
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
## 2022-05-24 DIAGNOSIS — S16.1XXA STRAIN OF NECK MUSCLE, INITIAL ENCOUNTER: ICD-10-CM

## 2022-05-24 DIAGNOSIS — S09.90XA CLOSED HEAD INJURY, INITIAL ENCOUNTER: ICD-10-CM

## 2022-05-24 DIAGNOSIS — S20.211A CONTUSION OF RIB ON RIGHT SIDE, INITIAL ENCOUNTER: ICD-10-CM

## 2022-05-24 PROCEDURE — 99284 EMERGENCY DEPT VISIT MOD MDM: CPT

## 2022-05-24 PROCEDURE — 72125 CT NECK SPINE W/O DYE: CPT

## 2022-05-24 PROCEDURE — 74011250636 HC RX REV CODE- 250/636: Performed by: EMERGENCY MEDICINE

## 2022-05-24 PROCEDURE — 96372 THER/PROPH/DIAG INJ SC/IM: CPT

## 2022-05-24 PROCEDURE — 71101 X-RAY EXAM UNILAT RIBS/CHEST: CPT

## 2022-05-24 PROCEDURE — 70450 CT HEAD/BRAIN W/O DYE: CPT

## 2022-05-24 RX ORDER — KETOROLAC TROMETHAMINE 30 MG/ML
30 INJECTION, SOLUTION INTRAMUSCULAR; INTRAVENOUS ONCE
Status: COMPLETED | OUTPATIENT
Start: 2022-05-24 | End: 2022-05-24

## 2022-05-24 RX ADMIN — KETOROLAC TROMETHAMINE 30 MG: 30 INJECTION, SOLUTION INTRAMUSCULAR; INTRAVENOUS at 23:00

## 2022-05-25 VITALS
TEMPERATURE: 98.2 F | WEIGHT: 146.61 LBS | OXYGEN SATURATION: 99 % | DIASTOLIC BLOOD PRESSURE: 60 MMHG | BODY MASS INDEX: 24.43 KG/M2 | RESPIRATION RATE: 16 BRPM | HEART RATE: 70 BPM | SYSTOLIC BLOOD PRESSURE: 111 MMHG | HEIGHT: 65 IN

## 2022-05-25 NOTE — ROUTINE PROCESS
I have reviewed discharge instructions with the patient. The patient verbalized understanding. Copy of discharge instructions given to [facility] to be given to facility.

## 2022-05-25 NOTE — ED PROVIDER NOTES
History of hypertension, diabetes, hyperlipidemia, atrial fibrillation, CVA, dementia, bladder cancer, diverticulitis. She presents via EMS after falling out of a chair prior to arrival.  She states that she hit the back of her head but did not lose consciousness. She denies any significant headache but does feel \"dizzy. \"  She also complains of right lateral lower rib cage pain. She has mild neck pain. She states that she felt \"okay\" prior to the fall. She reports chronic lower leg pain and chronic poor appetite. No fever, chest pain, abdominal pain, dyspnea.            Past Medical History:   Diagnosis Date    [facility]     Atrial fibrillation (HCC)     Bladder cancer (HCC)     Constipation     CVA (cerebral vascular accident) ([facility]     Dementia ([facility]     Diabetes ([facility]     Diverticulitis     DM (diabetes mellitus) ([facility]     Essential hypertension     Hemorrhoids     HTN (hypertension)     Hyperlipidemia        Past Surgical History:   Procedure Laterality Date    HX GI      HX HYSTERECTOMY      HX PACEMAKER PLACEMENT Left     HX TUBAL LIGATION      POLYPECTOMY- HOT BX           Family History:   Problem Relation Age of Onset    Hypertension Mother     Stroke Mother     Liver Disease Father     Hypertension Father     Breast Cancer Maternal Aunt        Social History     Socioeconomic History    Marital status: SINGLE     Spouse name: Not on file    Number of children: Not on file    Years of education: Not on file    Highest education level: Not on file   Occupational History    Not on file   Tobacco Use    Smoking status: Never Smoker    Smokeless tobacco: Never Used   Substance and Sexual Activity    Alcohol use: No    Drug use: No    Sexual activity: Never   Other Topics Concern    Not on file   Social History Narrative    ** Merged History Encounter **          Social Determinants of Health     Financial Resource Strain:     Difficulty of Paying Living Expenses: Not on file   Food Insecurity:     Worried About Running Out of Food in the Last Year: Not on file    Bernadine of Food in the Last Year: Not on file   Transportation Needs:     Lack of Transportation (Medical): Not on file    Lack of Transportation (Non-Medical): Not on file   Physical Activity:     Days of Exercise per Week: Not on file    Minutes of Exercise per Session: Not on file   Stress:     Feeling of Stress : Not on file   Social Connections:     Frequency of Communication with Friends and Family: Not on file    Frequency of Social Gatherings with Friends and Family: Not on file    Attends Restorationist Services: Not on file    Active Member of [address] [place] [address] or Organizations: Not on file    Attends Club or Organization Meetings: Not on file    Marital Status: Not on file   Intimate Partner Violence:     Fear of Current or Ex-Partner: Not on file    Emotionally Abused: Not on file    Physically Abused: Not on file    Sexually Abused: Not on file   Housing Stability:     Unable to Pay for Housing in the Last Year: Not on file    Number of [place] in the Last Year: Not on file    Unstable Housing in the Last Year: Not on file         ALLERGIES: Erythromycin, Flagyl [metronidazole], Macrobid [nitrofurantoin monohyd/m-cryst], Pcn [penicillins], and Sulfa (sulfonamide antibiotics)    Review of Systems   All other systems reviewed and are negative. Vitals:    05/24/22 2127   BP: (!) 147/85   Resp: 16   Temp: 98.2 °F (36.8 °C)   SpO2: 100%   Weight: 66.5 kg (146 lb 9.7 oz)   Height: 5' 5\" (1.651 m)            Physical Exam  Vitals and nursing note reviewed. Constitutional:       Appearance: She is well-developed. Comments: Elderly, fair historian. HENT:      Head: Normocephalic. Comments: Posterior scalp tenderness without noted swelling/abrasion. Eyes:      Conjunctiva/sclera: Conjunctivae normal.   Neck:      Trachea: No tracheal deviation.       Comments: Mild generalized posterior neck tenderness. Cardiovascular:      Rate and Rhythm: Normal rate and regular rhythm. Heart sounds: Normal heart sounds. No murmur heard. No friction rub. No gallop. Pulmonary:      Effort: Pulmonary effort is normal.      Breath sounds: Normal breath sounds. Abdominal:      Palpations: Abdomen is soft. Tenderness: There is no abdominal tenderness. Musculoskeletal:         General: No deformity. Comments: Mild right lower lateral rib cage tenderness. Skin:     General: Skin is warm and dry. Neurological:      General: No focal deficit present. Mental Status: She is alert. Comments: oriented          MDM       Procedures    Progress Note:  Results, treatment, and follow up plan have been discussed with patient. Questions were answered. Marvina Krabbe, MD    Assessment/plan: Dasia Luz out of a chair. Head injury and right-sided rib pain. She also complains of neck pain. Reassuring appearance/exam with stable vital signs. CT head and C-spine are okay. Right rib films negative for acute fracture. Home with PCP follow-up. Return precautions discussed.   Marvina Krabbe, MD

## 2022-05-25 NOTE — ED NOTES
Brief changed, pericare. 1 cm split at top of gluteal fold; 4 cm linear skin tear at left hip in crease of skin folds. Mepilex border applied.

## 2022-05-25 NOTE — ED NOTES
TRANSFER - OUT REPORT:    Verbal report given to Eleanor Huggins(name) on Gillian Jeffrey  Being discharged to [facility] (unit) for return from starting point. Report consisted of patients Situation, Background, Assessment and   Recommendations(SBAR). Information from the following report(s) SBAR and ED Summary was reviewed with the receiving nurse. Lines:       Opportunity for questions and clarification was provided.       Patient transported with:

## 2022-06-23 ENCOUNTER — TRANSCRIBE ORDER (OUTPATIENT)
Dept: SCHEDULING | Age: 82
End: 2022-06-23

## 2022-06-23 DIAGNOSIS — Z12.31 SCREENING MAMMOGRAM FOR HIGH-RISK PATIENT: Primary | ICD-10-CM

## 2022-06-29 ENCOUNTER — TRANSCRIBE ORDER (OUTPATIENT)
Dept: SCHEDULING | Age: 82
End: 2022-06-29

## 2022-06-29 DIAGNOSIS — R63.0 DECREASED APPETITE: Primary | ICD-10-CM

## 2022-06-29 DIAGNOSIS — R10.84 GENERALIZED ABDOMINAL PAIN: ICD-10-CM

## 2022-06-29 DIAGNOSIS — R63.4 WEIGHT LOSS: ICD-10-CM

## 2022-07-11 ENCOUNTER — HOSPITAL ENCOUNTER (OUTPATIENT)
Dept: CT IMAGING | Age: 82
Discharge: HOME | End: 2022-07-11
Attending: NURSE PRACTITIONER
Payer: MEDICARE

## 2022-07-11 DIAGNOSIS — R63.0 DECREASED APPETITE: ICD-10-CM

## 2022-07-11 DIAGNOSIS — R10.84 GENERALIZED ABDOMINAL PAIN: ICD-10-CM

## 2022-07-11 DIAGNOSIS — R63.4 WEIGHT LOSS: ICD-10-CM

## 2022-07-11 LAB — CREAT BLD-MCNC: 0.7 MG/DL (ref 0.6–1.3)

## 2022-07-11 PROCEDURE — 74177 CT ABD & PELVIS W/CONTRAST: CPT

## 2022-07-11 PROCEDURE — 74011000636 HC RX REV CODE- 636: Performed by: NURSE PRACTITIONER

## 2022-07-11 PROCEDURE — 74011000250 HC RX REV CODE- 250: Performed by: NURSE PRACTITIONER

## 2022-07-11 PROCEDURE — 82565 ASSAY OF CREATININE: CPT

## 2022-07-11 RX ORDER — BARIUM SULFATE 20 MG/ML
900 SUSPENSION ORAL
Status: COMPLETED | OUTPATIENT
Start: 2022-07-11 | End: 2022-07-11

## 2022-07-11 RX ADMIN — BARIUM SULFATE 900 ML: 20 SUSPENSION ORAL at 14:01

## 2022-07-11 RX ADMIN — IOPAMIDOL 100 ML: 755 INJECTION, SOLUTION INTRAVENOUS at 14:01

## 2022-07-15 ENCOUNTER — TELEPHONE (OUTPATIENT)
Dept: SURGERY | Age: 82
End: 2022-07-15

## 2022-07-15 NOTE — TELEPHONE ENCOUNTER
Attempted to call patient to set up an appointment with Dr. Tamara Alcantara in regards to a referral received from Dr. Carlos Manuel Franco. Left VM.

## 2022-07-26 ENCOUNTER — HOSPITAL ENCOUNTER (OUTPATIENT)
Dept: MAMMOGRAPHY | Age: 82
Discharge: HOME | End: 2022-07-26
Payer: MEDICARE

## 2022-07-26 DIAGNOSIS — Z12.31 SCREENING MAMMOGRAM FOR HIGH-RISK PATIENT: ICD-10-CM

## 2022-07-26 PROCEDURE — 77067 SCR MAMMO BI INCL CAD: CPT

## 2022-07-29 ENCOUNTER — TELEPHONE (OUTPATIENT)
Dept: SURGERY | Age: 82
End: 2022-07-29

## 2022-07-29 NOTE — TELEPHONE ENCOUNTER
Attempted to call the patient, and was able to get in touch with her nursing home and got hung up on. Called back a second time and no one answered. We were calling to reschedule her appointment on Monday 8/1 with Genevieve De La O. We also sent a message through her My Chart.

## 2022-08-29 ENCOUNTER — OFFICE VISIT (OUTPATIENT)
Dept: SURGERY | Age: 82
End: 2022-08-29
Payer: MEDICARE

## 2022-08-29 VITALS
SYSTOLIC BLOOD PRESSURE: 138 MMHG | RESPIRATION RATE: 18 BRPM | HEART RATE: 68 BPM | DIASTOLIC BLOOD PRESSURE: 79 MMHG | TEMPERATURE: 98.1 F | OXYGEN SATURATION: 96 %

## 2022-08-29 DIAGNOSIS — K43.2 VENTRAL INCISIONAL HERNIA: Primary | ICD-10-CM

## 2022-08-29 PROCEDURE — G8752 SYS BP LESS 140: HCPCS | Performed by: SURGERY

## 2022-08-29 PROCEDURE — G8510 SCR DEP NEG, NO PLAN REQD: HCPCS | Performed by: SURGERY

## 2022-08-29 PROCEDURE — 99202 OFFICE O/P NEW SF 15 MIN: CPT | Performed by: SURGERY

## 2022-08-29 PROCEDURE — 1101F PT FALLS ASSESS-DOCD LE1/YR: CPT | Performed by: SURGERY

## 2022-08-29 PROCEDURE — 1090F PRES/ABSN URINE INCON ASSESS: CPT | Performed by: SURGERY

## 2022-08-29 PROCEDURE — G8427 DOCREV CUR MEDS BY ELIG CLIN: HCPCS | Performed by: SURGERY

## 2022-08-29 PROCEDURE — 1123F ACP DISCUSS/DSCN MKR DOCD: CPT | Performed by: SURGERY

## 2022-08-29 PROCEDURE — G8400 PT W/DXA NO RESULTS DOC: HCPCS | Performed by: SURGERY

## 2022-08-29 PROCEDURE — G8420 CALC BMI NORM PARAMETERS: HCPCS | Performed by: SURGERY

## 2022-08-29 PROCEDURE — G8536 NO DOC ELDER MAL SCRN: HCPCS | Performed by: SURGERY

## 2022-08-29 PROCEDURE — G8754 DIAS BP LESS 90: HCPCS | Performed by: SURGERY

## 2022-08-29 NOTE — PROGRESS NOTES
1. Have you been to the ER, urgent care clinic since your last visit? Hospitalized since your last visit? No    2. Have you seen or consulted any other health care providers outside of the [address] [place] [address] since your last visit? Include any pap smears or colon screening.  No

## 2022-08-29 NOTE — PROGRESS NOTES
Meagan Dixon is a 80 y.o. female who is referred by Dr. Brooks Wagner for further evaluation of a ventral incisional hernia. Information obtained from patient, family, review of chart, review of outside records. Ms. Tia Carter has been experiencing abdominal pain for some time now. No associated nausea or vomiting. Ms. Tia Carter also reports constipation alternating with diarrhea as well as decreased appetite/taste for food and weight loss. Found to have a ventral incisional hernia. She has otherwise been in her usual state of health. CT scan abdomen/pelvis with po/IV contrast - 7/11/2022 - Ventral hernia containing mesenteric fat and the mesenteric wall of the transverse colon without evidence of strangulation but mild surrounding fat stranding. No bowel obstruction. Past Medical History:   Diagnosis Date    Atrial fibrillation ([address]     Atrial fibrillation ([address]     Bladder cancer (HCC)     Constipation     CVA (cerebral vascular accident) ([address]     Dementia ([address]     Diabetes ([address]     Diverticulitis     DM (diabetes mellitus) ([address]     Essential hypertension     Hemorrhoids     HTN (hypertension)     Hyperlipidemia     Ventral incisional hernia 8/29/2022     Past Surgical History:   Procedure Laterality Date    HX GI      HX HYSTERECTOMY      HX PACEMAKER PLACEMENT Left     HX TUBAL LIGATION      POLYPECTOMY- HOT BX       Family History   Problem Relation Age of Onset    Hypertension Mother     Stroke Mother     Liver Disease Father     Hypertension Father     Breast Cancer Maternal Aunt      Social History     Socioeconomic History    Marital status: SINGLE   Tobacco Use    Smoking status: Never    Smokeless tobacco: Never   Substance and Sexual Activity    Alcohol use: No    Drug use: No    Sexual activity: Never   Social History Narrative    ** Merged History Encounter **          Review of systems negative except as noted. Review of Systems   Constitutional:  Positive for weight loss. Decreased appetite and taste for food. Gastrointestinal:  Positive for abdominal pain, constipation and diarrhea. Negative for blood in stool, nausea and vomiting. Physical Exam  Vitals reviewed. Constitutional:       General: She is not in acute distress. Appearance: Normal appearance. She is normal weight. HENT:      Head: Normocephalic and atraumatic. Eyes:      General: No scleral icterus. Cardiovascular:      Rate and Rhythm: Normal rate and regular rhythm. Pulmonary:      Effort: Pulmonary effort is normal.      Breath sounds: Normal breath sounds. Abdominal:      General: There is no distension. Palpations: Abdomen is soft. Tenderness: There is no abdominal tenderness. There is no guarding or rebound. Hernia: A hernia is present. Hernia is present in the ventral area (Reducible. ). Comments: Well healed surgical scars. Musculoskeletal:         General: Normal range of motion. Neurological:      General: No focal deficit present. Mental Status: She is alert. ASSESSMENT and PLAN  Reviewed CT scan. Ms. Bruce Jordan is an 81 yo female with an easily reducible ventral incisional hernia. In view of the findings on H and P and CT scan, do not believe that there is an urgent indication for ventral hernia repair at this time as the hernia is easily reducible. Briefly discussed mesh ventral incisional hernia repair with Ms. Bruce Jordan and her family including risks of bleeding, infection, hernia recurrence. At this point in time, they do not wish to proceed with surgery. Will see as needed.        CC: MD Hui Newell NP

## 2022-08-30 ENCOUNTER — OFFICE VISIT (OUTPATIENT)
Dept: ENDOCRINOLOGY | Age: 82
End: 2022-08-30
Payer: MEDICARE

## 2022-08-30 VITALS
RESPIRATION RATE: 16 BRPM | HEIGHT: 65 IN | BODY MASS INDEX: 24.4 KG/M2 | OXYGEN SATURATION: 97 % | SYSTOLIC BLOOD PRESSURE: 126 MMHG | DIASTOLIC BLOOD PRESSURE: 48 MMHG | TEMPERATURE: 98.1 F | HEART RATE: 60 BPM

## 2022-08-30 DIAGNOSIS — E05.90 SUBCLINICAL HYPERTHYROIDISM: Primary | ICD-10-CM

## 2022-08-30 DIAGNOSIS — E04.2 MULTINODULAR GOITER: ICD-10-CM

## 2022-08-30 PROCEDURE — 1123F ACP DISCUSS/DSCN MKR DOCD: CPT | Performed by: INTERNAL MEDICINE

## 2022-08-30 PROCEDURE — G8400 PT W/DXA NO RESULTS DOC: HCPCS | Performed by: INTERNAL MEDICINE

## 2022-08-30 PROCEDURE — 1090F PRES/ABSN URINE INCON ASSESS: CPT | Performed by: INTERNAL MEDICINE

## 2022-08-30 PROCEDURE — G8754 DIAS BP LESS 90: HCPCS | Performed by: INTERNAL MEDICINE

## 2022-08-30 PROCEDURE — G8432 DEP SCR NOT DOC, RNG: HCPCS | Performed by: INTERNAL MEDICINE

## 2022-08-30 PROCEDURE — 1101F PT FALLS ASSESS-DOCD LE1/YR: CPT | Performed by: INTERNAL MEDICINE

## 2022-08-30 PROCEDURE — 99214 OFFICE O/P EST MOD 30 MIN: CPT | Performed by: INTERNAL MEDICINE

## 2022-08-30 PROCEDURE — G8752 SYS BP LESS 140: HCPCS | Performed by: INTERNAL MEDICINE

## 2022-08-30 PROCEDURE — G8420 CALC BMI NORM PARAMETERS: HCPCS | Performed by: INTERNAL MEDICINE

## 2022-08-30 PROCEDURE — G8427 DOCREV CUR MEDS BY ELIG CLIN: HCPCS | Performed by: INTERNAL MEDICINE

## 2022-08-30 PROCEDURE — G8536 NO DOC ELDER MAL SCRN: HCPCS | Performed by: INTERNAL MEDICINE

## 2022-08-30 RX ORDER — ONDANSETRON 4 MG/1
4 TABLET, FILM COATED ORAL
COMMUNITY

## 2022-08-30 RX ORDER — ASCORBIC ACID 500 MG
500 TABLET ORAL DAILY
COMMUNITY

## 2022-08-30 RX ORDER — TROSPIUM CHLORIDE 20 MG/1
20 TABLET, FILM COATED ORAL
COMMUNITY

## 2022-08-30 RX ORDER — SENNOSIDES 8.6 MG/1
1 TABLET ORAL
COMMUNITY

## 2022-08-30 RX ORDER — ETHYL ALCOHOL 70 %
SOLUTION, NON-ORAL TOPICAL AS NEEDED
COMMUNITY

## 2022-08-30 RX ORDER — LIDOCAINE 4 G/100G
PATCH TOPICAL EVERY 24 HOURS
COMMUNITY

## 2022-08-30 RX ORDER — DICLOFENAC SODIUM 10 MG/G
GEL TOPICAL 2 TIMES DAILY
COMMUNITY

## 2022-08-30 NOTE — PROGRESS NOTES
Yulisa Harman is a 80 y.o. female here for   Chief Complaint   Patient presents with    Thyroid Problem       1. Have you been to the ER, urgent care clinic since your last visit? Hospitalized since your last visit? -no    2. Have you seen or consulted any other health care providers outside of the [address] [place] [address] since your last visit? Include any pap smears or colon screening. -PCP

## 2022-08-30 NOTE — LETTER
8/30/2022    Patient: Bib Haney   YOB: 1940   Date of Visit: 8/30/2022     Raj Arevalo NP  [facility] [id number]  Via Fax: [phone]    Dear Raj Arevalo NP,      Thank you for referring Ms. Lyndon Zafar to [facility] for evaluation. My notes for this consultation are attached. If you have questions, please do not hesitate to call me. I look forward to following your patient along with you.       Sincerely,    Afshin Pepe MD

## 2022-08-30 NOTE — PROGRESS NOTES
History of present illness    Gustavo Daley is a [de-identified] y.o. female is here for follow-up   She is here with her daughter  Lives in an assisted living    Amiodarone induced thyrotoxicosis Type 1 diagnosed in- 2012  MNG with dominant nodules in the left lobe -   Hyperfunctioning nodule in the left lower pole, suppressing the   remainder of the gland -uptake 6%, patient was on amiodarone which could have decreased uptake  MMI  since 1/2013  On Amiodarone for A fib since 2011  She has bladder cancer    she has recurrent  Afib,  Has weakness, falls no    History from the daughter, reviewed MAR, labs from the ER visit    No history of known radiation exposure. Daughter has  thyroid disease. No family hx of thyroid cancer. Past Medical History:   Diagnosis Date    Atrial fibrillation (HCC)     Atrial fibrillation (HCC)     Bladder cancer (HCC)     Constipation     CVA (cerebral vascular accident) ([facility]     Dementia ([facility]     Diabetes ([facility]     Diverticulitis     DM (diabetes mellitus) ([facility]     Essential hypertension     Hemorrhoids     HTN (hypertension)     Hyperlipidemia        Current Outpatient Medications   Medication Sig    methIMAzole (TAPAZOLE) 5 mg tablet Take 1 Tab by mouth daily. Stop 2.5mg    mirabegron ER (Myrbetriq) 50 mg ER tablet Take 50 mg by mouth daily. trospium (SANCTURA XL) 60 mg capsule Take 60 mg by mouth Daily (before breakfast). ondansetron (ZOFRAN ODT) 4 mg disintegrating tablet Take 4 mg by mouth every eight (8) hours as needed for Nausea or Vomiting. pravastatin (PRAVACHOL) 80 mg tablet Take 80 mg by mouth nightly. acetaminophen (TYLENOL) 650 mg TbER Take 650 mg by mouth every eight (8) hours. losartan (COZAAR) 50 mg tablet Take 50 mg by mouth daily. amiodarone (CORDARONE) 200 mg tablet Take 200 mg by mouth daily. No current facility-administered medications for this visit.           Review of Systems:  Per HPI      Physical Examination:  Blood pressure (!) 140/57, pulse 61, temperature 98.1 °F (36.7 °C), temperature source Oral, resp. rate 14, height 5' 4\" (1.626 m), weight 134 lb 4.8 oz (60.9 kg), SpO2 99 %. Body mass index is 23.05 kg/m². General: pleasant, no distress, good eye contact  HEENT: no exopthalmos, no periorbital edema, no scleral/conjunctival injection, EOMI, no lid lag or stare  Neck: supple, no palpable thyroid nodules  Cardiovascular: regular, normal rate, normal S1 and S2, + murmur  Respiratory: clear to auscultation bilaterally    Musculoskeletal: No edema        Data Reviewed:     1/2013   A thyroid uptake and scan was performed after administration of 247 ? Ci of   I-123. Thyroid uptake is calculated at 6% (normal is 10-30%). The thyroid   scan demonstrates a hyperfunctioning nodule in the left lower pole which   suppresses the remainder of the gland. A thyroid ultrasound demonstrates that the right lobe measures 4.6x1. 9x1.6   cm. The isthmus measures 1.1-1.3 cm. There is a 1.6x1. 1x1.4 cm nodule in the   left isthmus. The left lobe measures 6.3x2. 1x2.2 cm. In the upper pole,   there is a 1.5x1. 2x1.9 cm nodule. In the lower left pole, there is a   1.8x1.7x2.0 cm solid and cystic nodule. IMPRESSION:    1. Hyperfunctioning nodule in the left lower pole, suppressing the   remainder of the gland. The thyroid uptake is low but this may be due to the   patient's amiodarone therapy. If the patient is clinically hyperthyroid, we   would be happy to treat the patient with radioactive iodine. 2. Bilateral thyroid nodules. Biopsy of the left isthmus nodule and the left   upper pole nodules is recommended.        Lab Results   Component Value Date/Time    TSH 0.02 (L) 10/13/2020 02:27 PM    T4, Free 1.2 10/13/2020 02:27 PM      11/2012  TSH 0.050  T4 7.4   T3 uptake 26  FTI 1.9     6/2012 CBC, BMP nl ,TSH - 0.390    Lab Results   Component Value Date/Time    TSH 3.52 03/06/2022 02:55 PM         [x] Reviewed labs    Assessment/Plan:     Subclinical hyperthyroidism - Amiodarone induced thyrotoxicosis(A I T) Type 1   Thyroid uptake  6% (nl 10-30%),scan demonstrated a hyperfunctioning nodule in the left lower pole with 2 hypofunctioning nodules one at the junction of isthmus and left lobe and the other in the left upper lobe. FNA of the isthmus nodule 0/32 - follicular cells,hurthle cell ,no malignant cells, left upper lobe nodule - benign 4/13. Ultrasound of the thyroid April 2014 showed increase in the size of the left upper lobe nodule, US 1/15 left thyroid nodule is now 3 cm ,FNA 7/15.  US 2/2016 - increase in left upper lobe nodule   Methimazole, on amiodarone so radioactive iodine therapy will not help  Ultrasound thyroid October 2020: Right thyroid lobe 3 nodules largest 1.6  Left thyroid lobe: 3 cm, 2.4 cm, 1.1 cm  Isthmus 2.2 cm  Ultrasound thyroid March 2022: Right lobe 1.6 cm, left lobe reported 1 nodule measuring 2.2 cm    Methimazole  Continue to monitor thyroid function tests every 6 months      Multinodular Goiter - prior FNAs negative  She has underlying bladder cancer, will focus on symptomatic treatment for multinodular goiter  She has no compressive symptoms      HTN -  Continue the same therapy        A fib - managed by Dr Liborio Ahmadi    Thank you for allowing me to participate in the care of this patient.     Jair Jimenez MD    Patient/caregiver verbalized understanding

## 2022-08-31 LAB
T4 FREE SERPL-MCNC: 0.8 NG/DL (ref 0.8–1.5)
TSH SERPL DL<=0.05 MIU/L-ACNC: 4.32 UIU/ML (ref 0.36–3.74)

## 2023-01-12 LAB — HBA1C MFR BLD HPLC: 5.3 %

## 2023-06-06 ENCOUNTER — TELEPHONE (OUTPATIENT)
Age: 83
End: 2023-06-06

## 2023-06-06 ENCOUNTER — OFFICE VISIT (OUTPATIENT)
Age: 83
End: 2023-06-06
Payer: MEDICARE

## 2023-06-06 VITALS
DIASTOLIC BLOOD PRESSURE: 63 MMHG | SYSTOLIC BLOOD PRESSURE: 141 MMHG | HEART RATE: 60 BPM | RESPIRATION RATE: 18 BRPM | TEMPERATURE: 97.3 F | OXYGEN SATURATION: 96 %

## 2023-06-06 DIAGNOSIS — E05.20 THYROTOXICOSIS WITH TOXIC MULTINODULAR GOITER AND WITHOUT THYROID STORM: ICD-10-CM

## 2023-06-06 DIAGNOSIS — L43.0 LICHEN PLANUS HYPERTROPHICUS: ICD-10-CM

## 2023-06-06 DIAGNOSIS — E04.2 MULTINODULAR GOITER: Primary | ICD-10-CM

## 2023-06-06 PROCEDURE — 1123F ACP DISCUSS/DSCN MKR DOCD: CPT | Performed by: INTERNAL MEDICINE

## 2023-06-06 PROCEDURE — G8427 DOCREV CUR MEDS BY ELIG CLIN: HCPCS | Performed by: INTERNAL MEDICINE

## 2023-06-06 PROCEDURE — G8400 PT W/DXA NO RESULTS DOC: HCPCS | Performed by: INTERNAL MEDICINE

## 2023-06-06 PROCEDURE — G8420 CALC BMI NORM PARAMETERS: HCPCS | Performed by: INTERNAL MEDICINE

## 2023-06-06 PROCEDURE — 99215 OFFICE O/P EST HI 40 MIN: CPT | Performed by: INTERNAL MEDICINE

## 2023-06-06 PROCEDURE — 3078F DIAST BP <80 MM HG: CPT | Performed by: INTERNAL MEDICINE

## 2023-06-06 PROCEDURE — 1090F PRES/ABSN URINE INCON ASSESS: CPT | Performed by: INTERNAL MEDICINE

## 2023-06-06 PROCEDURE — 3077F SYST BP >= 140 MM HG: CPT | Performed by: INTERNAL MEDICINE

## 2023-06-06 PROCEDURE — 1036F TOBACCO NON-USER: CPT | Performed by: INTERNAL MEDICINE

## 2023-06-06 NOTE — TELEPHONE ENCOUNTER
Pt has had a change in Pharmacy to [facility] at [address] [place] [address], [address] [place] [address], phone number is [phone]. Please change this as soon as possible.

## 2023-06-06 NOTE — PROGRESS NOTES
Geovanny Butcher is a 80 y.o. female here for   Chief Complaint   Patient presents with    Thyroid Problem       1. Have you been to the ER or an urgent care clinic since your last visit?  - no    2. Have you been hospitalized since your last visit? - no     3. Have you seen or consulted any other health care providers outside of the [address] [place] [address] since your last visit? Include any pap smears or colon screening.- pt see's a urologist, unsure of the name or hospital affiliation.
(ZOFRAN) 4 MG tablet Take 1 tablet by mouth every 8 hours as needed    polyethylene glycol (GLYCOLAX) 17 GM/SCOOP powder Take 17 g by mouth daily    pravastatin (PRAVACHOL) 80 MG tablet Take 1 tablet by mouth nightly    senna (SENOKOT) 8.6 MG tablet Take 1 tablet by mouth nightly    trospium (SANCTURA) 20 MG tablet Take 1 tablet by mouth 2 times daily (before meals)     No current facility-administered medications for this visit. Review of Systems:  Per HPI      Physical Examination:    General: pleasant, no distress, good eye contact  HEENT: no exopthalmos, no periorbital edema, no scleral/conjunctival injection, EOMI, no lid lag or stare  Neck: supple, no palpable thyroid nodules  Cardiovascular: regular, normal rate, normal S1 and S2, + murmur  Respiratory: clear to auscultation bilaterally    Musculoskeletal: No edema        Data Reviewed:     1/2013   A thyroid uptake and scan was performed after administration of 247 ? Ci of   I-123. Thyroid uptake is calculated at 6% (normal is 10-30%). The thyroid   scan demonstrates a hyperfunctioning nodule in the left lower pole which   suppresses the remainder of the gland. A thyroid ultrasound demonstrates that the right lobe measures 4.6x1. 9x1.6   cm. The isthmus measures 1.1-1.3 cm. There is a 1.6x1. 1x1.4 cm nodule in the   left isthmus. The left lobe measures 6.3x2. 1x2.2 cm. In the upper pole,   there is a 1.5x1. 2x1.9 cm nodule. In the lower left pole, there is a   1.8x1.7x2.0 cm solid and cystic nodule. IMPRESSION:    1. Hyperfunctioning nodule in the left lower pole, suppressing the   remainder of the gland. The thyroid uptake is low but this may be due to the   patient's amiodarone therapy. If the patient is clinically hyperthyroid, we   would be happy to treat the patient with radioactive iodine. 2. Bilateral thyroid nodules. Biopsy of the left isthmus nodule and the left   upper pole nodules is recommended.        Lab Results

## 2023-09-06 NOTE — ED TRIAGE NOTES
Pt feeling weak this am and had 2 witnessed falls this morning. Pt states she hit her head but no LOC. Pt just finished antibiotics for a UTI yesterday.  Pt c/o headache at this time, Subjective:      Patient ID: Omero Gramajo is a 80 y.o. male    HPI 80year old male who's burnham catheter is draining clear yellow urine. Voicing no other urological complaints.           Past Medical History:   Diagnosis Date    Anxiety     COPD (chronic obstructive pulmonary disease) (HCC)     Hyperlipidemia     Hypertension     Kidney stone      Past Surgical History:   Procedure Laterality Date    PROSTATE BIOPSY  08/21/2023    U/S guided prostate biopsy completed by Dr. Franny Park  8/21/2023    Courtney Fitch 8/21/2023 MLOZ ULTRASOUND     Social History     Socioeconomic History    Marital status:      Spouse name: None    Number of children: None    Years of education: None    Highest education level: None   Tobacco Use    Smoking status: Every Day     Packs/day: 1.00     Years: 60.00     Pack years: 60.00     Types: Cigarettes    Smokeless tobacco: Never   Substance and Sexual Activity    Alcohol use: Yes    Drug use: No   Social History Narrative    Lives With: Alone (Wife has dementia -moving to a nursing home [facility] in [address] from a memory care unit in [address] to finances)    Type of Home: House in [address]: One level    Home Access: Stairs to enter without rails - Number of Steps: 2    Bathroom Shower/Tub: Walk-in shower-Equipment: None    Home Equipment: None    Has the patient had two or more falls in the past year or any fall with injury in the past year?: No    Receives Help From: Family (son comes a couple times a week)    ADL Assistance: Independent    Homemaking Assistance: Independent (neighbor cuts grass), Homemaking Responsibilities: Yes    Ambulation Assistance: Independent, Transfer Assistance: Independent    Active : Yes    Occupation: Retired    IADL Comments: pt completes his own shopping, cuts some of his lawn         Social Determinants of Health     Financial Resource Strain: [address] mg  4 mg IntraVENous Q6H PRN Vivi Graves MD        polyethylene glycol (GLYCOLAX) packet 17 g  17 g Oral Daily PRN Vivi Graves MD   17 g at 08/29/23 1229    amLODIPine (NORVASC) tablet 5 mg  5 mg Oral Daily Vivi Graves MD   5 mg at 09/06/23 0817    atenolol (TENORMIN) tablet 25 mg  25 mg Oral Daily Vivi Graves MD   25 mg at 09/06/23 0818    fluticasone (FLONASE) 50 MCG/ACT nasal spray 2 spray  2 spray Nasal Daily Vivi Graves MD   2 spray at 09/05/23 1304    atorvastatin (LIPITOR) tablet 10 mg  10 mg Oral Nightly Vivi Channel, MD   10 mg at 09/05/23 2119    traZODone (DESYREL) tablet 150 mg  150 mg Oral Nightly Vivi Channel, MD   150 mg at 09/05/23 2119    pantoprazole (PROTONIX) tablet 40 mg  40 mg Oral QAM AC Vivi Channel MD   40 mg at 09/06/23 0606    ipratropium 0.5 mg-albuterol 2.5 mg (DUONEB) nebulizer solution 1 Dose  1 Dose Inhalation Q4H PRN Vivi Graves MD        calcium carbonate (TUMS) chewable tablet 750 mg  750 mg Oral TID PRN Vivi Graves MD   750 mg at 08/25/23 1304    busPIRone (BUSPAR) tablet 5 mg  5 mg Oral BID Vivi Graves MD   5 mg at 09/06/23 0818    baclofen (LIORESAL) tablet 5 mg  5 mg Oral BID Vivi Channel, MD   5 mg at 09/06/23 0817    acetaminophen (TYLENOL) tablet 650 mg  650 mg Oral Q4H PRN Flor Scullin, DO   650 mg at 08/23/23 0845    bisacodyl (DULCOLAX) suppository 10 mg  10 mg Rectal Daily PRN Flor Scullin, DO   10 mg at 08/29/23 1641    sodium phosphate (FLEET) rectal enema 1 enema  1 enema Rectal Daily PRN Flor Scullin, DO        Vitamin D (CHOLECALCIFEROL) tablet 2,000 Units  2,000 Units Oral Dinner Flor Scullin, DO   2,000 Units at 09/05/23 1740    cyanocobalamin injection 1,000 mcg  1,000 mcg IntraMUSCular Weekly [facility] Scullin, DO   1,000 mcg at 09/05/23 0816    coenzyme Q10 capsule 100 mg  100 mg Oral Daily [facility] Scullin, DO   100 mg at 09/06/23 0817    tamsulosin (FLOMAX) capsule 0.4 mg  0.4 mg Oral Dinner [facility] Scullin, DO   0.4 mg at 09/05/23

## 2023-12-12 ENCOUNTER — OFFICE VISIT (OUTPATIENT)
Age: 83
End: 2023-12-12
Payer: MEDICARE

## 2023-12-12 VITALS
BODY MASS INDEX: 24.4 KG/M2 | OXYGEN SATURATION: 98 % | HEIGHT: 65 IN | DIASTOLIC BLOOD PRESSURE: 57 MMHG | SYSTOLIC BLOOD PRESSURE: 126 MMHG | HEART RATE: 62 BPM | TEMPERATURE: 98.1 F

## 2023-12-12 DIAGNOSIS — E04.2 MULTINODULAR GOITER: ICD-10-CM

## 2023-12-12 DIAGNOSIS — E05.80 HYPERTHYROIDISM SECONDARY TO AMIODARONE: Primary | ICD-10-CM

## 2023-12-12 DIAGNOSIS — T46.2X5A HYPERTHYROIDISM SECONDARY TO AMIODARONE: Primary | ICD-10-CM

## 2023-12-12 DIAGNOSIS — I48.20 CHRONIC A-FIB (HCC): ICD-10-CM

## 2023-12-12 LAB
T4 FREE SERPL-MCNC: 1.1 NG/DL (ref 0.8–1.5)
TSH SERPL DL<=0.05 MIU/L-ACNC: 1.6 UIU/ML (ref 0.36–3.74)

## 2023-12-12 PROCEDURE — 1090F PRES/ABSN URINE INCON ASSESS: CPT | Performed by: INTERNAL MEDICINE

## 2023-12-12 PROCEDURE — 99214 OFFICE O/P EST MOD 30 MIN: CPT | Performed by: INTERNAL MEDICINE

## 2023-12-12 PROCEDURE — 3078F DIAST BP <80 MM HG: CPT | Performed by: INTERNAL MEDICINE

## 2023-12-12 PROCEDURE — 1123F ACP DISCUSS/DSCN MKR DOCD: CPT | Performed by: INTERNAL MEDICINE

## 2023-12-12 PROCEDURE — G8484 FLU IMMUNIZE NO ADMIN: HCPCS | Performed by: INTERNAL MEDICINE

## 2023-12-12 PROCEDURE — G8400 PT W/DXA NO RESULTS DOC: HCPCS | Performed by: INTERNAL MEDICINE

## 2023-12-12 PROCEDURE — G8420 CALC BMI NORM PARAMETERS: HCPCS | Performed by: INTERNAL MEDICINE

## 2023-12-12 PROCEDURE — 1036F TOBACCO NON-USER: CPT | Performed by: INTERNAL MEDICINE

## 2023-12-12 PROCEDURE — 3074F SYST BP LT 130 MM HG: CPT | Performed by: INTERNAL MEDICINE

## 2023-12-12 PROCEDURE — G8427 DOCREV CUR MEDS BY ELIG CLIN: HCPCS | Performed by: INTERNAL MEDICINE

## 2023-12-12 RX ORDER — HYDRALAZINE HYDROCHLORIDE 25 MG/1
TABLET, FILM COATED ORAL AS NEEDED
COMMUNITY
Start: 2023-10-28

## 2023-12-12 RX ORDER — AMLODIPINE BESYLATE 5 MG/1
TABLET ORAL
COMMUNITY
Start: 2023-12-08

## 2023-12-12 RX ORDER — PSEUDOEPHED/ACETAMINOPH/DIPHEN 30MG-500MG
TABLET ORAL
COMMUNITY
Start: 2023-09-10

## 2023-12-12 RX ORDER — ASPIRIN 81 MG/1
TABLET, COATED ORAL
COMMUNITY
Start: 2023-11-21

## 2023-12-12 NOTE — PROGRESS NOTES
History of present illness    Antonio Campa is a 80 y.o. female is here for follow-up   She is here with her daughter  Used to live in assisted living, CVA September 2023, went to rehab and now back with daughter  Armando Sanabria from June 2023 TSH normal  She is on methimazole 2.5 mg  No dysphagia  No chest pain, no palpitations, has an appointment to see electrophysiologist at [place] [facility]     Prior history    Amiodarone induced thyrotoxicosis Type 1 diagnosed in- 2012  MNG with dominant nodules in the left lobe -   Hyperfunctioning nodule in the left lower pole, suppressing the   remainder of the gland -uptake 6%, patient was on amiodarone which could have decreased uptake  MMI  since 1/2013  On Amiodarone for A fib since 2011  She has bladder cancer    she has recurrent  Afib,  Has weakness, falls no    History from the daughter, reviewed MAR, labs from the ER visit    No history of known radiation exposure. Daughter has  thyroid disease. No family hx of thyroid cancer.         Past Medical History:   Diagnosis Date    Atrial fibrillation ([address])     Atrial fibrillation (HCC)     Bladder cancer (HCC)     Constipation     CVA (cerebral vascular accident) ([address])     Dementia ([address])     Diabetes ([address])     Diverticulitis     DM (diabetes mellitus) ([address])     Essential hypertension     Hemorrhoids     HTN (hypertension)     Hyperlipidemia     Hyperthyroidism     Ventral incisional hernia 8/29/2022       Current Outpatient Medications   Medication Sig    ASPIRIN LOW DOSE 81 MG EC tablet     amLODIPine (NORVASC) 5 MG tablet     hydrALAZINE (APRESOLINE) 25 MG tablet as needed    Acetaminophen Extra Strength 500 MG TABS     amiodarone (CORDARONE) 200 MG tablet Take 1 tablet by mouth daily    losartan (COZAAR) 50 MG tablet Take 1 tablet by mouth 2 times daily    methIMAzole (TAPAZOLE) 5 MG tablet Take 1 tablet by mouth daily Talking 1/2 tablet daily    polyethylene glycol (GLYCOLAX) 17 GM/SCOOP powder Take 17 g by

## 2024-02-11 ENCOUNTER — HOSPITAL ENCOUNTER (EMERGENCY)
Facility: HOSPITAL | Age: 84
Discharge: HOME | End: 2024-02-11
Attending: STUDENT IN AN ORGANIZED HEALTH CARE EDUCATION/TRAINING PROGRAM
Payer: MEDICARE

## 2024-02-11 ENCOUNTER — APPOINTMENT (OUTPATIENT)
Facility: HOSPITAL | Age: 84
End: 2024-02-11
Payer: MEDICARE

## 2024-02-11 VITALS
RESPIRATION RATE: 16 BRPM | HEART RATE: 60 BPM | OXYGEN SATURATION: 99 % | WEIGHT: 150 LBS | BODY MASS INDEX: 24.99 KG/M2 | SYSTOLIC BLOOD PRESSURE: 121 MMHG | DIASTOLIC BLOOD PRESSURE: 78 MMHG | TEMPERATURE: 98.2 F | HEIGHT: 65 IN
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
## 2024-02-11 DIAGNOSIS — S09.90XA CLOSED HEAD INJURY, INITIAL ENCOUNTER: Primary | ICD-10-CM

## 2024-02-11 PROCEDURE — 70450 CT HEAD/BRAIN W/O DYE: CPT

## 2024-02-11 PROCEDURE — 93005 ELECTROCARDIOGRAM TRACING: CPT | Performed by: STUDENT IN AN ORGANIZED HEALTH CARE EDUCATION/TRAINING PROGRAM

## 2024-02-11 PROCEDURE — 99285 EMERGENCY DEPT VISIT HI MDM: CPT

## 2024-02-11 PROCEDURE — 6830039000 HC L3 TRAUMA ALERT

## 2024-02-11 PROCEDURE — 73562 X-RAY EXAM OF KNEE 3: CPT

## 2024-02-11 NOTE — ED TRIAGE NOTES
Presents with daughter for ground level fall, falling out of bed, at approx 1500 hitting head, hematoma to right side of head. Pt is on eliquis. No loc. [facility] called

## 2024-02-11 NOTE — ED PROVIDER NOTES
the emergency department. The importance of appropriate follow up was also discussed with the patient. More extensive discharge instructions were given in the patient's discharge paperwork.      ED FINAL IMPRESSION     1. Closed head injury, initial encounter    2. Fall, initial encounter          DISPOSITION/PLAN   DISPOSITION Decision To Discharge 02/11/2024 06:53:41 PM    Discharge Note: The patient is stable for discharge home. The signs, symptoms, diagnosis, and discharge instructions have been discussed, understanding conveyed, and agreed upon. The patient is to follow up as recommended or return to ER should their symptoms worsen.      PATIENT REFERRED TO:  [facility] EMERGENCY DEPT  [address] [facility] [id number] [phone]  Go to   As needed, If symptoms worsen    [facility], BRODY - NP  [address] [place] [address] [phone]    Schedule an appointment as soon as possible for a visit   For follow-up, For reevaluation        DISCHARGE MEDICATIONS:     Medication List        ASK your doctor about these medications      Acetaminophen Extra Strength 500 MG Tabs     amiodarone 200 MG tablet  Commonly known as: CORDARONE     amLODIPine 5 MG tablet  Commonly known as: NORVASC     Aspirin Low Dose 81 MG EC tablet  Generic drug: aspirin     hydrALAZINE 25 MG tablet  Commonly known as: APRESOLINE     losartan 50 MG tablet  Commonly known as: COZAAR     methIMAzole 5 MG tablet  Commonly known as: TAPAZOLE     polyethylene glycol 17 GM/SCOOP powder  Commonly known as: GLYCOLAX     pravastatin 80 MG tablet  Commonly known as: PRAVACHOL                DISCONTINUED MEDICATIONS:  Discharge Medication List as of 2/11/2024  7:10 PM          I am the Primary Clinician of Record. Odette Woody MD (electronically signed)    (Please note that parts of this dictation were completed with voice recognition software. Quite often unanticipated grammatical, syntax, homophones, and other

## 2024-02-12 LAB
EKG ATRIAL RATE: 59 BPM
EKG DIAGNOSIS: NORMAL
EKG Q-T INTERVAL: 488 MS
EKG QRS DURATION: 170 MS
EKG QTC CALCULATION (BAZETT): 488 MS
EKG R AXIS: -54 DEGREES
EKG T AXIS: 141 DEGREES
EKG VENTRICULAR RATE: 60 BPM

## 2024-03-29 ENCOUNTER — HOSPITAL ENCOUNTER (EMERGENCY)
Facility: HOSPITAL | Age: 84
Discharge: HOME | End: 2024-03-30
Attending: STUDENT IN AN ORGANIZED HEALTH CARE EDUCATION/TRAINING PROGRAM
Payer: MEDICARE

## 2024-03-29 ENCOUNTER — APPOINTMENT (OUTPATIENT)
Facility: HOSPITAL | Age: 84
End: 2024-03-29
Payer: MEDICARE

## 2024-03-29 DIAGNOSIS — S00.03XA CONTUSION OF SCALP, INITIAL ENCOUNTER: ICD-10-CM
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
## 2024-03-29 PROCEDURE — 72125 CT NECK SPINE W/O DYE: CPT

## 2024-03-29 PROCEDURE — 72170 X-RAY EXAM OF PELVIS: CPT

## 2024-03-29 PROCEDURE — 99285 EMERGENCY DEPT VISIT HI MDM: CPT

## 2024-03-29 PROCEDURE — 70450 CT HEAD/BRAIN W/O DYE: CPT

## 2024-03-29 PROCEDURE — 6830039000 HC L3 TRAUMA ALERT

## 2024-03-29 RX ORDER — HYDROCORTISONE ACETATE 25 MG/1
25 SUPPOSITORY RECTAL 2 TIMES DAILY
Qty: 10 SUPPOSITORY | Refills: 0 | Status: SHIPPED | OUTPATIENT
Start: 2024-03-29

## 2024-03-30 VITALS
DIASTOLIC BLOOD PRESSURE: 53 MMHG | TEMPERATURE: 97.6 F | SYSTOLIC BLOOD PRESSURE: 120 MMHG | OXYGEN SATURATION: 98 % | RESPIRATION RATE: 16 BRPM | HEART RATE: 60 BPM

## 2024-03-30 NOTE — ED NOTES
Report called to [facility]. All questions answered. [facility] leaving now with pt to transport back to facility.

## 2024-03-30 NOTE — ED NOTES
Kidney Post-Transplant Assessment    Referring Physician: Chris Adair  Current Nephrologist: Rajan Hayes    ORGAN: RIGHT KIDNEY  Donor Type: donation after brain death  PHS Increased Risk: yes  Cold Ischemia: 1,354 mins  Induction Medications: thymoglobulin    Subjective:     CC:  Reassessment of renal allograft function and management of chronic immunosuppression.    HPI:  Mr. Ramirez is a 73 y.o. 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 His most recent creatinine is 1.5. He takes mycophenolate mofetil, prednisone and tacrolimus for maintenance immunosuppression. His post transplant course has been complicated by urinary retention severe diarrhea and hypophosphatemia .    Hospitalization/ ED visits  None    Interval HX:  Reports overall doing well. Does have trouble sleeping but contribute that to not being in his own home.     Intake 2.2L  UOP 2-2.5L  BP 130s-150s/80  Peripheral edema none  Weight: no scale  Appetite good  Wound--staples  fx assessment doing well  Lab /diagnostic results reviewed with patient today.   All questions answered        Current Outpatient Medications:     ALPRAZolam (XANAX) 1 MG tablet, Take 1 tablet (1 mg total) by mouth 3 (three) times daily as needed for Anxiety., Disp: 42 tablet, Rfl: 0    cholecalciferol, vitamin D3, (VITAMIN D3) 50 mcg (2,000 unit) Tab, Take 1 tablet (2,000 Units total) by mouth once daily., Disp: 60 tablet, Rfl: 11    cinacalcet (SENSIPAR) 30 MG Tab, Take 1 tablet (30 mg total) by mouth daily with breakfast., Disp: 30 tablet, Rfl: 11    famotidine (PEPCID) 20 MG tablet, Take 1 tablet (20 mg total) by mouth every evening. STOP 4/20/22., Disp: 30 tablet, Rfl: 1    fluticasone propionate (FLONASE) 50 mcg/actuation nasal spray, 2 sprays (100 mcg total) by Each Nostril route as needed for Allergies., Disp: 16 g, Rfl: 3    k phos di & mono-sod phos mono (K-PHOS-NEUTRAL) 250 mg  Pt had two briefs on that were saturated with urine and bowels. Pt cleaned. Call bell in reach   Tab, Take 1 tablet by mouth 3 (three) times daily., Disp: 90 tablet, Rfl: 11    magnesium oxide (MAG-OX) 400 mg (241.3 mg magnesium) tablet, Take 1 tablet (400 mg total) by mouth 2 (two) times daily., Disp: 60 tablet, Rfl: 2    metoprolol tartrate (LOPRESSOR) 50 MG tablet, Take 1 tablet (50 mg total) by mouth 2 (two) times daily. HOLD FOR SBP <120 or HR <60, Disp: 60 tablet, Rfl: 11    mycophenolate (CELLCEPT) 250 mg Cap, Take 4 capsules (1,000 mg total) by mouth 2 (two) times daily., Disp: 240 capsule, Rfl: 11    NIFEdipine (PROCARDIA-XL) 60 MG (OSM) 24 hr tablet, Take 1 tablet (60 mg total) by mouth every 12 (twelve) hours., Disp: 60 tablet, Rfl: 11    nystatin (MYCOSTATIN) 100,000 unit/mL suspension, Take 5 mLs (500,000 Units total) by mouth 3 (three) times daily after meals., Disp: 450 mL, Rfl: 0    oxybutynin (DITROPAN) 5 MG Tab, Take 1 tablet (5 mg total) by mouth 3 (three) times daily as needed (bladder spasms)., Disp: 10 tablet, Rfl: 0    oxyCODONE (ROXICODONE) 5 MG immediate release tablet, Take 1 tablet (5 mg total) by mouth every 6 (six) hours as needed for Pain., Disp: 28 tablet, Rfl: 0    predniSONE (DELTASONE) 5 MG tablet, Take by mouth daily: 20mg 3/22/22 -4/21, 15mg 4/22-5/22, 10mg 5/23-6/22, 5mg 6/23- thereafter (Patient taking differently: Take by mouth daily: 20mg 3/22/22 -4/21, 15mg 4/22-5/22, 10mg 5/23-6/22, 5mg 6/23- thereafter), Disp: 120 tablet, Rfl: 5    sildenafiL (VIAGRA) 50 MG tablet, Take 1 tablet (50 mg total) by mouth daily as needed for Erectile Dysfunction., Disp: 30 tablet, Rfl: 11    sodium bicarbonate 650 MG tablet, Take 2 tablets (1,300 mg total) by mouth 2 (two) times daily., Disp: 120 tablet, Rfl: 11    sulfamethoxazole-trimethoprim 400-80mg (BACTRIM,SEPTRA) 400-80 mg per tablet, Take 1 tablet by mouth once daily. Stop 9/16/22, Disp: 30 tablet, Rfl: 5    tacrolimus (PROGRAF) 1 MG Cap, Take 6 capsules (6 mg total) by mouth every 12 (twelve) hours., Disp: 420 capsule,  "Rfl: 11    tamsulosin (FLOMAX) 0.4 mg Cap, Take 1 capsule (0.4 mg total) by mouth every evening., Disp: 30 capsule, Rfl: 11    valGANciclovir (VALCYTE) 450 mg Tab, Take 1 tablet (450 mg total) by mouth every morning. Stop 6/16/22, Disp: 30 tablet, Rfl: 2    Current Facility-Administered Medications:     penicillin G benzathine (BICILLIN LA) injection 2.4 Million Units, 2.4 Million Units, Intramuscular, Weekly, Sara Flanagan MD, 2.4 Million Units at 03/28/22 1115      Past Medical History:   Diagnosis Date    Anemia in CKD (chronic kidney disease)     Atrial fibrillation     CKD (chronic kidney disease) stage 4, GFR 15 11/26/2014    Colon cancer screening 12/19/2014    Elevated PSA 11/26/2014    HTN (hypertension) diagnosed in his 40s 10/16/2014    Monoclonal gammopathy 11/26/2014    Phobic anxiety disorder 11/26/2014    Proteinuria 11/26/2014    Stage 3a chronic kidney disease 3/25/2022         Review of Systems   Constitutional: Negative for appetite change, chills, fatigue and fever.   HENT: Negative for trouble swallowing.    Respiratory: Negative for cough, chest tightness, shortness of breath and wheezing.    Cardiovascular: Negative for chest pain, palpitations and leg swelling.   Gastrointestinal: Negative for abdominal pain, constipation, diarrhea and nausea.   Genitourinary: Negative for difficulty urinating, frequency and urgency.   Musculoskeletal: Negative for arthralgias and myalgias.   Skin: Negative for rash.   Allergic/Immunologic: Positive for immunocompromised state.   Neurological: Negative for dizziness, weakness, light-headedness and headaches.   Psychiatric/Behavioral: Negative for sleep disturbance.        Objective:   BP (!) 154/73 (BP Location: Right arm, Patient Position: Sitting, BP Method: Large (Automatic))   Pulse 78   Temp 97.2 °F (36.2 °C) (Temporal)   Resp 16   Ht 5' 7" (1.702 m)   Wt 86.2 kg (190 lb 0.6 oz)   SpO2 99%   BMI 29.76 kg/m²       Physical " Exam  Constitutional:       General: He is not in acute distress.     Appearance: He is well-developed. He is not diaphoretic.   Cardiovascular:      Rate and Rhythm: Normal rate and regular rhythm.      Heart sounds: Normal heart sounds. No murmur heard.    No friction rub. No gallop.   Pulmonary:      Effort: Pulmonary effort is normal. No respiratory distress.      Breath sounds: Normal breath sounds. No wheezing or rales.   Abdominal:      General: Bowel sounds are normal. There is no distension.      Palpations: Abdomen is soft.      Tenderness: There is no abdominal tenderness.   Musculoskeletal:         General: No tenderness. Normal range of motion.   Skin:     General: Skin is warm and dry.      Findings: No rash.      Nails: There is no clubbing.   Neurological:      Mental Status: He is alert and oriented to person, place, and time.   Psychiatric:         Behavior: Behavior normal.            Labs:  Lab Results   Component Value Date    WBC 8.24 04/04/2022    HGB 10.7 (L) 04/04/2022    HCT 33.7 (L) 04/04/2022     04/04/2022    K 4.2 04/04/2022     04/04/2022    CO2 21 (L) 04/04/2022    BUN 20 04/04/2022    CREATININE 1.7 (H) 04/04/2022    EGFRNONAA 39.1 (A) 04/04/2022    CALCIUM 8.8 04/04/2022    PHOS 2.2 (L) 04/04/2022    MG 1.5 (L) 04/04/2022    ALBUMIN 3.5 04/04/2022    AST 16 03/19/2022    ALT 16 03/19/2022    UTPCR 0.78 (H) 03/18/2022    .1 (H) 03/18/2022    TACROLIMUS 11.4 03/31/2022       No results found for: EXTANC, EXTWBC, EXTSEGS, EXTPLATELETS, EXTHEMOGLOBI, EXTHEMATOCRI, EXTCREATININ, EXTSODIUM, EXTPOTASSIUM, EXTBUN, EXTCO2, EXTCALCIUM, EXTPHOSPHORU, EXTGLUCOSE, EXTALBUMIN, EXTAST, EXTALT, EXTBILITOTAL, EXTLIPASE, EXTAMYLASE    No results found for: EXTCYCLOSLVL, EXTSIROLIMUS, EXTTACROLVL, EXTPROTCRE, EXTPTHINTACT, EXTPROTEINUA, EXTWBCUA, EXTRBCUA    Labs were reviewed with the patient    Assessment:     1. S/P kidney transplant    2. Stage 3a chronic kidney disease    3.  Long-term use of immunosuppressant medication    4. Primary hypertension    5. Hyperlipidemia, unspecified hyperlipidemia type        Plan:     Continue current IS therapy regimen  Increase water intake  Pending Tac level      1. CKD stage: will continue follow up as per our center guidelines. patient to continue close follow up with the local General nephrologist. Education provided in appropriate fluid intake, potassium intake. Continue with oral hydration.      2. Immunosuppression: Prograf trough 11.4, therapeutic target 8-10. Continue Prograf decreased 6/6, MMF 1000 Mg BID, and Prednisone   Lab Results   Component Value Date    TACROLIMUS 11.4 03/31/2022    TACROLIMUS 9.5 03/28/2022    TACROLIMUS 7.0 03/25/2022   Will closely monitor for toxicities, education provided about adherence to medicines and need to communicate any side effect to the transplant nurse or physician.      3. Allograft Function:stable at baseline for the patient. Continue follow up as per our guidelines and with the local General nephrologist. Communication will be sent today.     4/4/2022  POD 16   BUN 8 - 23 mg/dL 20   Creatinine 0.5 - 1.4 mg/dL 1.7 (A)   eGFR if African American >60 mL/min/1.73 m^2 45.2 (A)   Glucose 70 - 110 mg/dL 97       4. Hypertension management:  Continue with home blood pressure monitoring, low salt and healthy life discussed with the patient.  BP Readings from Last 3 Encounters:   04/04/22 (!) 154/73   03/28/22 (!) 196/84   03/28/22 (!) 196/84   MTP, nifedipine    5. Metabolic Bone Disease/Secondary Hyperparathyroidism:calcium and phosphorus level discussed with the patient, patient will continue follow up with the general nephrologist for management of metabolic bone disease calcium and phosphorus as per our center protocol. Will monitor PTH, Vit D level, calcium.   Lab Results   Component Value Date    .1 (H) 03/18/2022    CALCIUM 8.8 04/04/2022    PHOS 2.2 (L) 04/04/2022    PHOS 2.2 (L) 03/31/2022     PHOS 1.7 (L) 03/28/2022   senispar, kpn, magox    6. Electrolytes: reviewed with the patient, essentially within the normal range no need for acute changes today, will monitor as per our center guidelines.  Lab Results   Component Value Date     04/04/2022    K 4.2 04/04/2022     04/04/2022    CO2 21 (L) 04/04/2022    CO2 20 (L) 03/31/2022    CO2 22 (L) 03/28/2022   nabicarb     7. Anemia: will continue monitoring as per our center guidelines. No indication for acute intervention today.  Lab Results   Component Value Date    WBC 8.24 04/04/2022    HGB 10.7 (L) 04/04/2022    HCT 33.7 (L) 04/04/2022    MCV 93 04/04/2022     04/04/2022       8.Proteinuria: will continue with pr/cr ratio as per our center guidelines  Lab Results   Component Value Date    PROTEINURINE 102 (H) 03/18/2022    CREATRANDUR 130.0 03/18/2022    UTPCR 0.78 (H) 03/18/2022    UTPCR 5.38 (H) 02/01/2017    UTPCR 5.23 (H) 01/03/2017        9. BK virus infection screening: will continue with urine or blood PCR as per our guidelines to prevent BK virus viremia and allograft dysfunction  No results found for: BKVIRUSDNAUR, BKQUANTURINE, BKVIRUSLOG, BKVIRUSURINE, BKVIRUSPCRQB      10. Weight education: provided during the clinic visit.  Body mass index is 29.76 kg/m².     11.Patient safety education regarding immunosuppression including prophylaxis posttransplant for CMV, PCP : Education provided about vaccination and prevention of infections.    12.  Cytopenias: no significant cytopenias will monitor as per our guidelines. Medicine list reviewed including potential causes of drug-induced cytopenias  Lab Results   Component Value Date    WBC 8.24 04/04/2022    HGB 10.7 (L) 04/04/2022    HCT 33.7 (L) 04/04/2022    MCV 93 04/04/2022     04/04/2022       13. Post-transplant Prophylaxis; CMV Infection, PJP and Candida mucosistis and other indicated for this particular patient.   PCP PROPHYLAXIS: Bactrim until 9/16/22  CMV  PROPHYLAXIS: Valcyte until 6/16/22  FUNGAL PROPHYLAXIS: Nystatin until 4/22/22      Exercise: reminded Telly of the importance of regular exercise for weight management, blood sugar and blood pressure management.  I also explained exercise has been shown to improve cardiovascular health, energy level, and sleep hygiene.  Lastly, I advised him that cardiovascular complications are leading cause of death for renal transplant recipients, and regular exercise can help lower this risk.       Follow-up:   Clinic: return to transplant clinic weekly for the first month after transplant; every 2 weeks during months 2-3; then at 6-, 9-, 12-, 18-, 24-, and 36- months post-transplant to reassess for complications from immunosuppression toxicity and monitor for rejection.  Annually thereafter.    Labs: since patient remains at high risk for rejection and drug-related complications that warrant close monitoring, labs will be ordered as follows: continue twice weekly CBC, renal panel, and drug level for first month; then same labs once weekly through 3rd month post-transplant.  Urine for UA and protein/creatinine ratio monthly.  Serum BK - PCR at 1-, 3-, 6-, 9-, 12-, 18-, 24-, 36-, 48-, and 60 months post-transplant.  Hepatic panel at 1-, 2-, 3-, 6-, 9-, 12-, 18-, 24-, and 36- months post-transplant.    Judi Robert NP       Education:   Material provided to the patient.  Patient reminded to call with any health changes since these can be early signs of significant complications.  Also, I advised the patient to be sure any new medications or changes of old medications are discussed with either a pharmacist or physician knowledgeable with transplant to avoid rejection/drug toxicity related to significant drug interactions.    Patient advised that it is recommended that all transplanted patients, and their close contacts and household members receive Covid vaccination.

## 2024-03-30 NOTE — ED PROVIDER NOTES
[facility] EMERGENCY DEPT  EMERGENCY DEPARTMENT HISTORY AND PHYSICAL EXAM      Date: 3/29/2024  Patient Name: Viv Gonzales  MRN: [id number]  YOB: 1940  Date of evaluation: 3/29/2024  Provider: Celestino Rivas MD   Note Started: 8:16 PM EDT 3/29/24    HISTORY OF PRESENT ILLNESS     Chief Complaint   Patient presents with    Fall       History Provided By: Patient    HPI: Viv Gonzales is a 83 y.o. female presents emergency department status post slide off of wheelchair.  Patient reportedly slid off wheelchair hit back of head.  No loss of consciousness.  Patient is currently on Eliquis.  Patient complaining of bilateral hip pain, headache.  Denies any weakness in extremities    PAST MEDICAL HISTORY   Past Medical History:  Past Medical History:   Diagnosis Date    Atrial fibrillation (HCC)     Atrial fibrillation (HCC)     Bladder cancer (HCC)     Constipation     CVA (cerebral vascular accident) (HCC)     Dementia (HCC)     Diabetes (HCC)     Diverticulitis     DM (diabetes mellitus) (HCC)     Essential hypertension     Hemorrhoids     HTN (hypertension)     Hyperlipidemia     Hyperthyroidism     Ventral incisional hernia 8/29/2022       Past Surgical History:  Past Surgical History:   Procedure Laterality Date    GI      HYSTERECTOMY (CERVIX STATUS UNKNOWN)      PACEMAKER PLACEMENT Left     POLYPECTOMY HOT BX      TUBAL LIGATION         Family History:  Family History   Problem Relation Age of Onset    Liver Disease Father     Stroke Mother     Hypertension Mother     Breast Cancer Maternal Aunt     Hypertension Father        Social History:  Social History     Tobacco Use    Smoking status: Never    Smokeless tobacco: Never   Substance Use Topics    Alcohol use: No    Drug use: No       Allergies:  Allergies   Allergen Reactions    Erythromycin Hives    Metronidazole Hives    Nitrofurantoin Hives    Penicillins Hives    Sulfa Antibiotics Hives       PCP: None, None    Current Meds:   No current  no cervical spine tenderness do not place patient in cervical collar.    Secondary survey shows elderly female, in no distress, I do not appreciate any obvious hematoma, bony step-off to scalp.  No cervical spine tenderness to palpation.  Additionally patient complaining of bilateral trochanteric tenderness.  Differential includes head contusion, intracranial hemorrhage, cervical spine injury.  Hip fracture, hip contusion.    Will obtain head CT, cervical spine CT, pelvic x-ray.        Records Reviewed (source and summary of external notes): Prior medical records and Nursing notes    Vitals:    Vitals:    03/29/24 1858 03/29/24 1900 03/29/24 1915 03/29/24 1930   BP: (!) 111/49 (!) 108/47 (!) 117/52 (!) 116/52   Pulse: 63 61 63 60   Resp: 20 14 23 15   Temp: 98.7 °F (37.1 °C)      TempSrc: Oral      SpO2: 97% 98% 97% 98%        ED COURSE  ED Course as of 03/29/24 2239   Fri Mar 29, 2024   2020 Pelvic x-ray reviewed by myself.  I do not appreciate any obvious fractures, degenerative disease noted. [PZ]   2021 I discussed results with patient, at this time no signs symptoms of acute intracranial or cervical spine injury, no pelvic fracture.  Most likely patient suffered only head contusion, will discharge patient back to nursing home [PZ]      ED Course User Index  [PZ] Celestino Rivas MD       SEPSIS Reassessment: Sepsis reassessment not applicable    Clinical Management Tools:  Not Applicable    Patient was given the following medications:  Medications - No data to display    CONSULTS: See ED Course/MDM for further details.  None     Social Determinants affecting Diagnosis/Treatment: None    Smoking Cessation: Not Applicable    PROCEDURES   Unless otherwise noted above, none.  Procedures      CRITICAL CARE TIME   CRITICAL CARE NOTE :    10:39 PM    IMPENDING DETERIORATION -Airway, Respiratory, Cardiovascular, and CNS  ASSOCIATED RISK FACTORS - Trauma  MANAGEMENT- Bedside Assessment and Supervision of

## 2024-06-03 ENCOUNTER — NURSE ONLY (OUTPATIENT)
Age: 84
End: 2024-06-03

## 2024-06-03 ENCOUNTER — TELEPHONE (OUTPATIENT)
Age: 84
End: 2024-06-03

## 2024-06-03 DIAGNOSIS — E04.2 MULTINODULAR GOITER: ICD-10-CM

## 2024-06-03 DIAGNOSIS — E05.20 THYROTOXICOSIS WITH TOXIC MULTINODULAR GOITER AND WITHOUT THYROID STORM: ICD-10-CM

## 2024-06-03 LAB
T4 FREE SERPL-MCNC: 1.2 NG/DL (ref 0.8–1.5)
TSH SERPL DL<=0.05 MIU/L-ACNC: 0.16 UIU/ML (ref 0.36–3.74)

## 2024-06-03 NOTE — TELEPHONE ENCOUNTER
[facility] pharmacy [facility] # [phone] address [address], [address] [place] [address]     Can this be added to her pharmacy profile I can't get into it

## 2024-06-10 ENCOUNTER — OFFICE VISIT (OUTPATIENT)
Age: 84
End: 2024-06-10
Payer: MEDICARE

## 2024-06-10 VITALS
DIASTOLIC BLOOD PRESSURE: 59 MMHG | OXYGEN SATURATION: 100 % | HEART RATE: 74 BPM | TEMPERATURE: 97 F | SYSTOLIC BLOOD PRESSURE: 139 MMHG

## 2024-06-10 DIAGNOSIS — E04.2 MULTINODULAR GOITER: Primary | ICD-10-CM

## 2024-06-10 DIAGNOSIS — T46.2X5A HYPERTHYROIDISM SECONDARY TO AMIODARONE: ICD-10-CM

## 2024-06-10 DIAGNOSIS — E05.80 HYPERTHYROIDISM SECONDARY TO AMIODARONE: ICD-10-CM

## 2024-06-10 DIAGNOSIS — I48.20 CHRONIC A-FIB (HCC): ICD-10-CM

## 2024-06-10 PROCEDURE — G8427 DOCREV CUR MEDS BY ELIG CLIN: HCPCS | Performed by: INTERNAL MEDICINE

## 2024-06-10 PROCEDURE — 3078F DIAST BP <80 MM HG: CPT | Performed by: INTERNAL MEDICINE

## 2024-06-10 PROCEDURE — G8400 PT W/DXA NO RESULTS DOC: HCPCS | Performed by: INTERNAL MEDICINE

## 2024-06-10 PROCEDURE — 1090F PRES/ABSN URINE INCON ASSESS: CPT | Performed by: INTERNAL MEDICINE

## 2024-06-10 PROCEDURE — 99214 OFFICE O/P EST MOD 30 MIN: CPT | Performed by: INTERNAL MEDICINE

## 2024-06-10 PROCEDURE — 3075F SYST BP GE 130 - 139MM HG: CPT | Performed by: INTERNAL MEDICINE

## 2024-06-10 PROCEDURE — G8420 CALC BMI NORM PARAMETERS: HCPCS | Performed by: INTERNAL MEDICINE

## 2024-06-10 PROCEDURE — 1123F ACP DISCUSS/DSCN MKR DOCD: CPT | Performed by: INTERNAL MEDICINE

## 2024-06-10 PROCEDURE — G2211 COMPLEX E/M VISIT ADD ON: HCPCS | Performed by: INTERNAL MEDICINE

## 2024-06-10 PROCEDURE — 1036F TOBACCO NON-USER: CPT | Performed by: INTERNAL MEDICINE

## 2024-06-10 RX ORDER — FUROSEMIDE 20 MG/1
20 TABLET ORAL
COMMUNITY
Start: 2024-05-17

## 2024-06-10 RX ORDER — METHIMAZOLE 5 MG/1
5 TABLET ORAL DAILY
Qty: 30 TABLET | Refills: 3 | Status: SHIPPED | OUTPATIENT
Start: 2024-06-10 | End: 2024-06-10 | Stop reason: SDUPTHER

## 2024-06-10 RX ORDER — METHIMAZOLE 5 MG/1
5 TABLET ORAL DAILY
Qty: 90 TABLET | Refills: 3 | Status: SHIPPED | OUTPATIENT
Start: 2024-06-10

## 2024-06-10 NOTE — PROGRESS NOTES
I have reviewed all needed documentation in preparation for visit. Verified patient by name and date of birth  Viv Gonzales is a 83 y.o. female Thyroid Problem (Hyperthyroidism)  .    Vitals:    06/10/24 1132   BP: (!) 139/59   Pulse: 74   Temp: 97 °F (36.1 °C)   SpO2: 100%   Weight: Comment: Patient is in Wheelchair       No LMP recorded.         Health Maintenance Review: Patient reminded of \"due or due soon\" health maintenance. I have asked the patient to contact his/her primary care provider (PCP) for follow-up on his/her health maintenance.    \"Have you been to the ER, urgent care clinic since your last visit?  Hospitalized since your last visit?\"    NO    “Have you seen or consulted any other health care providers outside of [facility] since your last visit?”    NO

## 2024-06-10 NOTE — PROGRESS NOTES
History of present illness    Viv Gonzales is here for follow-up of hyperthyroidism, multinodular goiter  She is here with her daughter  Used to live in assisted living, CVA September 2023, went to rehab and now back with daughter  Labs from June 2023 TSH normal, TSH from June 2024 low, free T4 normal  Getting medications at assisted living  She is on methimazole 2.5 mg  No dysphagia  No palpitations, followed by cardiology for A-fib      Prior history    Amiodarone induced thyrotoxicosis Type 1 diagnosed in- 2012  MNG with dominant nodules in the left lobe -   Hyperfunctioning nodule in the left lower pole, suppressing the   remainder of the gland -uptake 6%, patient was on amiodarone which could have decreased uptake  MMI  since 1/2013  On Amiodarone for A fib since 2011  She has bladder cancer    she has recurrent  Afib,  Has weakness, falls no    History from the daughter, reviewed MAR, labs from the ER visit    No history of known radiation exposure.Daughter has  thyroid disease. No family hx of thyroid cancer.        Past Medical History:   Diagnosis Date    Atrial fibrillation (HCC)     Atrial fibrillation (HCC)     Bladder cancer (HCC)     Constipation     CVA (cerebral vascular accident) (HCC)     Dementia (HCC)     Diabetes (HCC)     Diverticulitis     DM (diabetes mellitus) (HCC)     Essential hypertension     Hemorrhoids     HTN (hypertension)     Hyperlipidemia     Hyperthyroidism     Ventral incisional hernia 8/29/2022       Current Outpatient Medications   Medication Sig    apixaban (ELIQUIS) 2.5 MG TABS tablet Take 1 tablet by mouth 2 times daily    furosemide (LASIX) 20 MG tablet Take 1 tablet by mouth    methIMAzole (TAPAZOLE) 5 MG tablet Take 1 tablet by mouth daily    amLODIPine (NORVASC) 5 MG tablet Take 2 tablets by mouth daily    hydrALAZINE (APRESOLINE) 25 MG tablet as needed    Acetaminophen Extra Strength 500 MG TABS     losartan (COZAAR) 50 MG tablet Take 1 tablet by mouth 2 times

## 2025-02-13 ENCOUNTER — OFFICE VISIT (OUTPATIENT)
Age: 85
End: 2025-02-13
Payer: MEDICARE

## 2025-02-13 VITALS
HEIGHT: 65 IN | SYSTOLIC BLOOD PRESSURE: 92 MMHG | HEART RATE: 62 BPM | DIASTOLIC BLOOD PRESSURE: 51 MMHG | BODY MASS INDEX: 24.96 KG/M2

## 2025-02-13 DIAGNOSIS — C67.9 MALIGNANT NEOPLASM OF URINARY BLADDER, UNSPECIFIED SITE (HCC): Primary | ICD-10-CM

## 2025-02-13 DIAGNOSIS — C67.9 MALIGNANT NEOPLASM OF URINARY BLADDER, UNSPECIFIED SITE (HCC): ICD-10-CM

## 2025-02-13 PROCEDURE — G8420 CALC BMI NORM PARAMETERS: HCPCS | Performed by: UROLOGY

## 2025-02-13 PROCEDURE — G8427 DOCREV CUR MEDS BY ELIG CLIN: HCPCS | Performed by: UROLOGY

## 2025-02-13 PROCEDURE — 3078F DIAST BP <80 MM HG: CPT | Performed by: UROLOGY

## 2025-02-13 PROCEDURE — 1090F PRES/ABSN URINE INCON ASSESS: CPT | Performed by: UROLOGY

## 2025-02-13 PROCEDURE — 3074F SYST BP LT 130 MM HG: CPT | Performed by: UROLOGY

## 2025-02-13 PROCEDURE — 99204 OFFICE O/P NEW MOD 45 MIN: CPT | Performed by: UROLOGY

## 2025-02-13 PROCEDURE — G8400 PT W/DXA NO RESULTS DOC: HCPCS | Performed by: UROLOGY

## 2025-02-13 PROCEDURE — 1159F MED LIST DOCD IN RCRD: CPT | Performed by: UROLOGY

## 2025-02-13 PROCEDURE — 1036F TOBACCO NON-USER: CPT | Performed by: UROLOGY

## 2025-02-13 PROCEDURE — 1123F ACP DISCUSS/DSCN MKR DOCD: CPT | Performed by: UROLOGY

## 2025-02-13 RX ORDER — AMLODIPINE BESYLATE 10 MG/1
TABLET ORAL
COMMUNITY
Start: 2023-12-20

## 2025-02-13 NOTE — ASSESSMENT & PLAN NOTE
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  Plan on evaluation with labs, CT and cystoscopy.

## 2025-02-13 NOTE — PROGRESS NOTES
HISTORY OF PRESENT ILLNESS  Viv Gonzales is a 84 y.o. female.   has a past medical history of Atrial fibrillation (HCC), Atrial fibrillation (HCC), Bladder cancer (HCC), Constipation, CVA (cerebral vascular accident) (HCC), Dementia (HCC), Diabetes (HCC), Diverticulitis, DM (diabetes mellitus) (HCC), Essential hypertension, Hemorrhoids, HTN (hypertension), Hyperlipidemia, Hyperthyroidism, and Ventral incisional hernia.  has a past surgical history that includes gi; Hysterectomy; pacemaker placement (Left); Tubal ligation; and polypectomy hot bx.  Chief Complaint   Patient presents with    [facility]    Bladder Cancer     She is here with her daughter Destiney Aguero.  This daughter says she did not grow up or live with her mother and she is getting to know her.  She states she is her POA now.      She had HGMIBC 8/3116.  She had 2 cycles of chemo but then developed a stroke. She was treated at [facility].  She went to [place], [place].    She had 4 more cycles of chemotherapy 5/2017.  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  She did begin to have radiation treatment.  She suffered a brain bleed and her radiation was discontinued. She had 5FU and mitomycing Jan 2018 and stopped due to hospitalization.  She was in [facility] and was told she was cancer free.       Cysto 12/30/21 reportedly without recurrent bladder cancer.  CT 2/23/22 without recurrent disease.  She last saw Dr. Evan Matos 12/2022    H/o UTIs. H/o urgency and previously on Myrbetriq/ Vesicare. Not on bladder medication now.  She uses pads for incontinence.  PVR 167cc in the past.  Her daughter just got her a bedside commode.  She is fearful of standing.  She needs assistance with transfer.           1. 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  Plan on evaluation with labs, CT

## 2025-02-27 LAB
ALBUMIN SERPL-MCNC: 3.5 G/DL (ref 3.7–4.7)
ALP SERPL-CCNC: 424 IU/L (ref 44–121)
ALT SERPL-CCNC: 13 IU/L (ref 0–32)
AST SERPL-CCNC: 27 IU/L (ref 0–40)
BASOPHILS # BLD AUTO: 0 X10E3/UL (ref 0–0.2)
BASOPHILS NFR BLD AUTO: 1 %
BILIRUB SERPL-MCNC: 0.9 MG/DL (ref 0–1.2)
BUN SERPL-MCNC: 10 MG/DL (ref 8–27)
BUN/CREAT SERPL: 14 (ref 12–28)
CALCIUM SERPL-MCNC: 8.9 MG/DL (ref 8.7–10.3)
CHLORIDE SERPL-SCNC: 104 MMOL/L (ref 96–106)
CO2 SERPL-SCNC: 20 MMOL/L (ref 20–29)
CREAT SERPL-MCNC: 0.7 MG/DL (ref 0.57–1)
EGFRCR SERPLBLD CKD-EPI 2021: 85 ML/MIN/1.73
EOSINOPHIL # BLD AUTO: 0.1 X10E3/UL (ref 0–0.4)
EOSINOPHIL NFR BLD AUTO: 2 %
ERYTHROCYTE [DISTWIDTH] IN BLOOD BY AUTOMATED COUNT: 15.3 % (ref 11.7–15.4)
GLOBULIN SER CALC-MCNC: 3.7 G/DL (ref 1.5–4.5)
GLUCOSE SERPL-MCNC: 156 MG/DL (ref 70–99)
HCT VFR BLD AUTO: 34.5 % (ref 34–46.6)
HGB BLD-MCNC: 11.1 G/DL (ref 11.1–15.9)
IMM GRANULOCYTES # BLD AUTO: 0 X10E3/UL (ref 0–0.1)
IMM GRANULOCYTES NFR BLD AUTO: 0 %
LYMPHOCYTES # BLD AUTO: 0.9 X10E3/UL (ref 0.7–3.1)
LYMPHOCYTES NFR BLD AUTO: 23 %
MCH RBC QN AUTO: 27.1 PG (ref 26.6–33)
MCHC RBC AUTO-ENTMCNC: 32.2 G/DL (ref 31.5–35.7)
MCV RBC AUTO: 84 FL (ref 79–97)
MONOCYTES # BLD AUTO: 0.3 X10E3/UL (ref 0.1–0.9)
MONOCYTES NFR BLD AUTO: 9 %
NEUTROPHILS # BLD AUTO: 2.5 X10E3/UL (ref 1.4–7)
NEUTROPHILS NFR BLD AUTO: 65 %
PLATELET # BLD AUTO: 273 X10E3/UL (ref 150–450)
POTASSIUM SERPL-SCNC: 3.5 MMOL/L (ref 3.5–5.2)
PROT SERPL-MCNC: 7.2 G/DL (ref 6–8.5)
RBC # BLD AUTO: 4.1 X10E6/UL (ref 3.77–5.28)
SODIUM SERPL-SCNC: 141 MMOL/L (ref 134–144)
WBC # BLD AUTO: 3.8 X10E3/UL (ref 3.4–10.8)

## 2025-03-12 ENCOUNTER — RESULTS FOLLOW-UP (OUTPATIENT)
Age: 85
End: 2025-03-12

## 2025-03-12 DIAGNOSIS — C67.9 MALIGNANT NEOPLASM OF URINARY BLADDER, UNSPECIFIED SITE (HCC): Primary | ICD-10-CM

## 2025-03-12 DIAGNOSIS — R74.8 ELEVATED ALKALINE PHOSPHATASE LEVEL: ICD-10-CM

## 2025-03-19 PROBLEM — I63.40 CEREBRAL INFARCTION DUE TO EMBOLISM OF CEREBRAL ARTERY (HCC): Status: ACTIVE | Noted: 2018-01-30

## 2025-03-20 ENCOUNTER — LAB (OUTPATIENT)
Age: 85
End: 2025-03-20

## 2025-03-20 DIAGNOSIS — T46.2X5A HYPERTHYROIDISM SECONDARY TO AMIODARONE: ICD-10-CM

## 2025-03-20 DIAGNOSIS — E05.80 HYPERTHYROIDISM SECONDARY TO AMIODARONE: ICD-10-CM

## 2025-03-20 DIAGNOSIS — E04.2 MULTINODULAR GOITER: ICD-10-CM

## 2025-03-21 LAB
T4 FREE SERPL-MCNC: 1.1 NG/DL (ref 0.8–1.5)
TSH SERPL DL<=0.05 MIU/L-ACNC: 3.28 UIU/ML (ref 0.36–3.74)

## 2025-03-27 ENCOUNTER — OFFICE VISIT (OUTPATIENT)
Age: 85
End: 2025-03-27
Payer: MEDICARE

## 2025-03-27 VITALS
SYSTOLIC BLOOD PRESSURE: 106 MMHG | TEMPERATURE: 98.1 F | WEIGHT: 139.8 LBS | DIASTOLIC BLOOD PRESSURE: 43 MMHG | HEART RATE: 63 BPM | HEIGHT: 65 IN | BODY MASS INDEX: 23.29 KG/M2 | OXYGEN SATURATION: 100 %

## 2025-03-27 DIAGNOSIS — E04.2 MULTINODULAR GOITER: ICD-10-CM

## 2025-03-27 DIAGNOSIS — E05.80 HYPERTHYROIDISM SECONDARY TO AMIODARONE: Primary | ICD-10-CM

## 2025-03-27 DIAGNOSIS — T46.2X5A HYPERTHYROIDISM SECONDARY TO AMIODARONE: Primary | ICD-10-CM

## 2025-03-27 PROCEDURE — 1126F AMNT PAIN NOTED NONE PRSNT: CPT | Performed by: INTERNAL MEDICINE

## 2025-03-27 PROCEDURE — G2211 COMPLEX E/M VISIT ADD ON: HCPCS | Performed by: INTERNAL MEDICINE

## 2025-03-27 PROCEDURE — 1036F TOBACCO NON-USER: CPT | Performed by: INTERNAL MEDICINE

## 2025-03-27 PROCEDURE — 99215 OFFICE O/P EST HI 40 MIN: CPT | Performed by: INTERNAL MEDICINE

## 2025-03-27 PROCEDURE — 1160F RVW MEDS BY RX/DR IN RCRD: CPT | Performed by: INTERNAL MEDICINE

## 2025-03-27 PROCEDURE — G8427 DOCREV CUR MEDS BY ELIG CLIN: HCPCS | Performed by: INTERNAL MEDICINE

## 2025-03-27 PROCEDURE — 3078F DIAST BP <80 MM HG: CPT | Performed by: INTERNAL MEDICINE

## 2025-03-27 PROCEDURE — 1123F ACP DISCUSS/DSCN MKR DOCD: CPT | Performed by: INTERNAL MEDICINE

## 2025-03-27 PROCEDURE — 1090F PRES/ABSN URINE INCON ASSESS: CPT | Performed by: INTERNAL MEDICINE

## 2025-03-27 PROCEDURE — 3074F SYST BP LT 130 MM HG: CPT | Performed by: INTERNAL MEDICINE

## 2025-03-27 PROCEDURE — G8400 PT W/DXA NO RESULTS DOC: HCPCS | Performed by: INTERNAL MEDICINE

## 2025-03-27 PROCEDURE — 1159F MED LIST DOCD IN RCRD: CPT | Performed by: INTERNAL MEDICINE

## 2025-03-27 PROCEDURE — G8420 CALC BMI NORM PARAMETERS: HCPCS | Performed by: INTERNAL MEDICINE

## 2025-03-27 RX ORDER — METHIMAZOLE 5 MG/1
2.5 TABLET ORAL DAILY
Qty: 45 TABLET | Refills: 3 | Status: SHIPPED | OUTPATIENT
Start: 2025-03-27

## 2025-03-27 NOTE — PROGRESS NOTES
Viv Gonzales is a 84 y.o. female here for   Chief Complaint   Patient presents with    Thyroid Problem       1. Have you been to the ER, urgent care clinic since your last visit?  Hospitalized since your last visit? -no    2. Have you seen or consulted any other health care providers outside of the [facility] since your last visit?  Include any pap smears or colon screening.-Urology scheduled for Cystoscopy and have bone scan., cardiology And PCP

## 2025-03-27 NOTE — PROGRESS NOTES
History of present illness    Viv Gonzales is here for follow-up of hyperthyroidism, multinodular goiter  She is here with her daughter  Used to live in assisted living, CVA September 2023, went to rehab and now back with daughter    She is on methimazole 5 mg  No dysphagia  No palpitations, followed by cardiology for A-fib  Off amiodarone    ALP is 400     Prior history    Amiodarone induced thyrotoxicosis Type 1 diagnosed in- 2012  MNG with dominant nodules in the left lobe -   Hyperfunctioning nodule in the left lower pole, suppressing the   remainder of the gland -uptake 6%, patient was on amiodarone which could have decreased uptake  MMI  since 1/2013  On Amiodarone for A fib since 2011  She has bladder cancer            Past Medical History:   Diagnosis Date    Atrial fibrillation (HCC)     Atrial fibrillation (HCC)     Bladder cancer (HCC)     Constipation     CVA (cerebral vascular accident) (HCC)     Dementia (HCC)     Diabetes (HCC)     Diverticulitis     DM (diabetes mellitus) (HCC)     Essential hypertension     Hemorrhoids     HTN (hypertension)     Hyperlipidemia     Hyperthyroidism     Ventral incisional hernia 8/29/2022       Current Outpatient Medications   Medication Sig    NORVASC 10 MG tablet     apixaban (ELIQUIS) 2.5 MG TABS tablet Take 1 tablet by mouth 2 times daily    methIMAzole (TAPAZOLE) 5 MG tablet Take 1 tablet by mouth daily    Acetaminophen Extra Strength 500 MG TABS     losartan (COZAAR) 50 MG tablet Take 1 tablet by mouth 2 times daily    polyethylene glycol (GLYCOLAX) 17 GM/SCOOP powder Take 17 g by mouth as needed     No current facility-administered medications for this visit.         Physical Examination:    General: pleasant, no distress, good eye contact  HEENT: no exopthalmos, no periorbital edema, no scleral/conjunctival injection, EOMI, no lid lag or stare  Neck: supple, no palpable thyroid nodules  Cardiovascular: regular, normal rate, normal S1 and S2, +

## 2025-04-30 PROBLEM — Z79.01 ON CONTINUOUS ORAL ANTICOAGULATION: Status: ACTIVE | Noted: 2025-04-30

## 2025-05-08 ENCOUNTER — PROCEDURE VISIT (OUTPATIENT)
Age: 85
End: 2025-05-08
Payer: MEDICARE

## 2025-05-08 VITALS
BODY MASS INDEX: 23.32 KG/M2 | WEIGHT: 140 LBS | HEIGHT: 65 IN | SYSTOLIC BLOOD PRESSURE: 109 MMHG | DIASTOLIC BLOOD PRESSURE: 63 MMHG | HEART RATE: 60 BPM

## 2025-05-08 DIAGNOSIS — F98.1 PSYCHOGENIC FECAL INCONTINENCE: ICD-10-CM

## 2025-05-08 DIAGNOSIS — N30.20 CHRONIC CYSTITIS: ICD-10-CM

## 2025-05-08 DIAGNOSIS — C67.9 MALIGNANT NEOPLASM OF URINARY BLADDER, UNSPECIFIED SITE (HCC): Primary | ICD-10-CM

## 2025-05-08 DIAGNOSIS — Z79.01 ON CONTINUOUS ORAL ANTICOAGULATION: ICD-10-CM

## 2025-05-08 DIAGNOSIS — N35.12 POSTINFECTIVE URETHRAL STRICTURE IN FEMALE: ICD-10-CM

## 2025-05-08 PROCEDURE — 3074F SYST BP LT 130 MM HG: CPT | Performed by: UROLOGY

## 2025-05-08 PROCEDURE — 99214 OFFICE O/P EST MOD 30 MIN: CPT | Performed by: UROLOGY

## 2025-05-08 PROCEDURE — G8400 PT W/DXA NO RESULTS DOC: HCPCS | Performed by: UROLOGY

## 2025-05-08 PROCEDURE — 51700 IRRIGATION OF BLADDER: CPT | Performed by: UROLOGY

## 2025-05-08 PROCEDURE — G8420 CALC BMI NORM PARAMETERS: HCPCS | Performed by: UROLOGY

## 2025-05-08 PROCEDURE — 52281 CYSTOSCOPY AND TREATMENT: CPT | Performed by: UROLOGY

## 2025-05-08 PROCEDURE — 1036F TOBACCO NON-USER: CPT | Performed by: UROLOGY

## 2025-05-08 PROCEDURE — 1123F ACP DISCUSS/DSCN MKR DOCD: CPT | Performed by: UROLOGY

## 2025-05-08 PROCEDURE — 1090F PRES/ABSN URINE INCON ASSESS: CPT | Performed by: UROLOGY

## 2025-05-08 PROCEDURE — 3078F DIAST BP <80 MM HG: CPT | Performed by: UROLOGY

## 2025-05-08 PROCEDURE — 1159F MED LIST DOCD IN RCRD: CPT | Performed by: UROLOGY

## 2025-05-08 PROCEDURE — G8427 DOCREV CUR MEDS BY ELIG CLIN: HCPCS | Performed by: UROLOGY

## 2025-05-08 RX ORDER — CIPROFLOXACIN 500 MG/1
500 TABLET, FILM COATED ORAL 2 TIMES DAILY
Qty: 6 TABLET | Refills: 0 | Status: SHIPPED | OUTPATIENT
Start: 2025-05-08

## 2025-05-08 RX ORDER — METHENAMINE HIPPURATE 1000 MG/1
1 TABLET ORAL 2 TIMES DAILY WITH MEALS
Qty: 180 TABLET | Refills: 3 | Status: SHIPPED | OUTPATIENT
Start: 2025-05-08

## 2025-05-08 RX ORDER — FUROSEMIDE 20 MG/1
20 TABLET ORAL DAILY
COMMUNITY

## 2025-05-08 NOTE — PROGRESS NOTES
HISTORY OF PRESENT ILLNESS  Viv Gonzales is a 84 y.o. female.   has a past medical history of Atrial fibrillation (HCC), Atrial fibrillation (HCC), Bladder cancer (HCC), Constipation, CVA (cerebral vascular accident) (HCC), Dementia (HCC), Diabetes (HCC), Diverticulitis, DM (diabetes mellitus) (HCC), Essential hypertension, Hemorrhoids, HTN (hypertension), Hyperlipidemia, Hyperthyroidism, and Ventral incisional hernia.  has a past surgical history that includes gi; Hysterectomy; pacemaker placement (Left); Tubal ligation; and polypectomy hot bx.  Chief Complaint   Patient presents with    Procedure     cystoscopy     She is here with her daughter Destiney Aguero.  This daughter says she did not grow up or live with her mother and she is getting to know her.  She states she is her POA now.  The patient is out of assisted living and at home.  The daughter notes that she is a pharmacist and nurse;  she is very frustrated with caring for her mother.    She is here for further evaluation.    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  She had 2 cycles of chemo but then developed a stroke. She was treated at [facility].  She went to [place], [place].    She had 4 more cycles of chemotherapy 5/2017.  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  She did begin to have radiation treatment.  She suffered a brain bleed and her radiation was discontinued. She had 5FU and mitomycing Jan 2018 and stopped due to hospitalization.  She was in [facility] and was told she was cancer free.       Cysto 12/30/21 reportedly without recurrent bladder cancer.  CT 2/23/22 without recurrent disease.  She last saw Dr. Evan Matos 12/2022    H/o UTIs. H/o urgency and previously on Myrbetriq/ Vesicare. Not on bladder medication now.  She uses pads for incontinence.  PVR 167cc in the past.  Her daughter just got her a bedside commode.  She is fearful of standing.  She needs assistance with transfer.

## 2025-05-08 NOTE — PROGRESS NOTES
Cystoscopy, urethral dilation and bladder irrigation  Findings:  Initial residual: large  Anterior urethra: strictured to about 12-14 Czech, dilated with the cystoscope  Bladder neck: Normal appearing  Bladder mucosa: Extensive trabeculation and cellule formation.  No obvious tumors or focal erythema.  The bladder urine was cloudy and foul-smelling  trabeculation: 4+ Ureteral orifices: normal, efflux clear urine    The bladder was irrigated and drained multiple times greater than 1000 mL were used to flush the bladder mostly clear.  Less than 0.04% chlorhexidine was used to flush the bladder at the end.      Impression: Trabeculated bladder with incomplete emptying and probable chronic UTI.  No evidence of recurrent bladder cancer.

## 2025-05-08 NOTE — ASSESSMENT & PLAN NOTE
She has poor hygiene.  She has limited mobility but also does not participate well with her self-care.  She tends to soil himself without getting up to use the commode.  This is a risk factor for continued cystitis.

## 2025-05-08 NOTE — ASSESSMENT & PLAN NOTE
Obvious urinary infection today.  The bladder was irrigated.  She was given a dose of Cipro in the office and I will give her a 3-day course.  She should probably be on methenamine to decrease colonization.

## 2025-05-08 NOTE — PROGRESS NOTES
Chief Complaint   Patient presents with    Procedure     cystoscopy     1. Have you been to the ER, urgent care clinic since your last visit?  Hospitalized since your last visit?No    2. Have you seen or consulted any other health care providers outside of the [facility] since your last visit?  Include any pap smears or colon screening. No  /63   Pulse 60   Ht 1.651 m (5' 5\")   Wt 63.5 kg (140 lb)   BMI 23.30 kg/m²

## 2025-05-08 NOTE — ASSESSMENT & PLAN NOTE
Chronically ill.  No evidence of recurrence of bladder cancer on cystoscopy today.  Per the records it appears to have been more than 5 years since her last recurrence.  I would recommend annual evaluation.  She may need to be done in the hospital due to her mobility needs.

## 2025-05-09 LAB — BACTERIA UR CULT: NORMAL

## 2025-06-13 ENCOUNTER — HOSPITAL ENCOUNTER (OUTPATIENT)
Facility: HOSPITAL | Age: 85
Discharge: HOME | End: 2025-06-16
Payer: MEDICARE

## 2025-06-13 DIAGNOSIS — R74.8 ELEVATED ALKALINE PHOSPHATASE LEVEL: ICD-10-CM

## 2025-06-13 DIAGNOSIS — C67.9 MALIGNANT NEOPLASM OF URINARY BLADDER, UNSPECIFIED SITE (HCC): ICD-10-CM

## 2025-06-13 PROCEDURE — A9503 TC99M MEDRONATE: HCPCS | Performed by: NURSE PRACTITIONER

## 2025-06-13 PROCEDURE — 78306 BONE IMAGING WHOLE BODY: CPT | Performed by: NURSE PRACTITIONER

## 2025-06-13 PROCEDURE — 3430000000 HC RX DIAGNOSTIC RADIOPHARMACEUTICAL: Performed by: NURSE PRACTITIONER

## 2025-06-13 RX ORDER — TC 99M MEDRONATE 20 MG/10ML
23.8 INJECTION, POWDER, LYOPHILIZED, FOR SOLUTION INTRAVENOUS
Status: COMPLETED | OUTPATIENT
Start: 2025-06-13 | End: 2025-06-13

## 2025-06-13 RX ADMIN — TC 99M MEDRONATE 23.8 MILLICURIE: 20 INJECTION, POWDER, LYOPHILIZED, FOR SOLUTION INTRAVENOUS at 11:00

## 2025-06-17 ENCOUNTER — RESULTS FOLLOW-UP (OUTPATIENT)
Age: 85
End: 2025-06-17

## 2025-06-17 NOTE — TELEPHONE ENCOUNTER
I spoke with her daughter Destiney.      The bone scan reveals no pattern of skeletal metastasis.   She had lumbar fractures acute and subacute.     The daughter says her spirits are good about there being no cancer mets.  They went to [facility] already.  She has back pains.  H/o femur fracture.  She is getting follow up for appropriate braces.  They are going [facility] for inpatient rehab.      Tony Christian MD

## 2025-06-17 NOTE — TELEPHONE ENCOUNTER
----- Message from BRODY Orozco NP sent at 6/16/2025  8:56 AM EDT -----    ----- Message -----  From: Khurram Nur Incoming Orders Results To [facility]  Sent: 6/13/2025   3:30 PM EDT  To: BRODY Orozco NP

## 2025-06-25 ENCOUNTER — HOSPITAL ENCOUNTER (OUTPATIENT)
Facility: HOSPITAL | Age: 85
Discharge: HOME | End: 2025-06-28
Attending: UROLOGY
Payer: MEDICARE

## 2025-06-25 DIAGNOSIS — C67.9 MALIGNANT NEOPLASM OF URINARY BLADDER, UNSPECIFIED SITE (HCC): ICD-10-CM

## 2025-06-25 LAB
BUN SERPL-MCNC: 14 MG/DL (ref 6–20)
CREAT SERPL-MCNC: 0.71 MG/DL (ref 0.55–1.02)

## 2025-06-25 PROCEDURE — 6360000004 HC RX CONTRAST MEDICATION: Performed by: UROLOGY

## 2025-06-25 PROCEDURE — 74177 CT ABD & PELVIS W/CONTRAST: CPT

## 2025-06-25 PROCEDURE — 84520 ASSAY OF UREA NITROGEN: CPT

## 2025-06-25 PROCEDURE — 82565 ASSAY OF CREATININE: CPT

## 2025-06-25 RX ORDER — IOPAMIDOL 755 MG/ML
100 INJECTION, SOLUTION INTRAVASCULAR
Status: COMPLETED | OUTPATIENT
Start: 2025-06-25 | End: 2025-06-25

## 2025-06-25 RX ADMIN — IOPAMIDOL 100 ML: 755 INJECTION, SOLUTION INTRAVENOUS at 14:17

## 2025-07-03 ENCOUNTER — RESULTS FOLLOW-UP (OUTPATIENT)
Age: 85
End: 2025-07-03

## 2025-07-08 ENCOUNTER — HOSPITAL ENCOUNTER (OUTPATIENT)
Facility: HOSPITAL | Age: 85
Discharge: HOME | End: 2025-07-11
Payer: MEDICARE

## 2025-07-08 VITALS — OXYGEN SATURATION: 99 % | HEART RATE: 65 BPM

## 2025-07-08 DIAGNOSIS — S32.020A COMPRESSION FRACTURE OF L2 VERTEBRA, INITIAL ENCOUNTER (HCC): ICD-10-CM

## 2025-07-08 PROCEDURE — 72148 MRI LUMBAR SPINE W/O DYE: CPT

## 2025-07-08 NOTE — PROGRESS NOTES
Patient scan complete  Patient tolerated scan  Pacer transferred back to normal pacing mode at 1543  Current rate back to baseline 61-65 bpm  Patient discharged to [place] with family by MRI staff

## 2025-07-08 NOTE — PROGRESS NOTES
Patient to MRI for scan  Patient identified, checklist complete by MRI staff  Patient agrees to scan  Baseline rate 61-65 bpm  Pacer in SureScan mode at 1507, Pt ok to continue  Current rate 80-85 bpm